# Patient Record
Sex: FEMALE | Race: WHITE | HISPANIC OR LATINO | Employment: UNEMPLOYED | URBAN - METROPOLITAN AREA
[De-identification: names, ages, dates, MRNs, and addresses within clinical notes are randomized per-mention and may not be internally consistent; named-entity substitution may affect disease eponyms.]

---

## 2018-09-30 ENCOUNTER — HOSPITAL ENCOUNTER (EMERGENCY)
Facility: CLINIC | Age: 23
Discharge: HOME | End: 2018-09-30
Attending: EMERGENCY MEDICINE | Admitting: EMERGENCY MEDICINE
Payer: COMMERCIAL

## 2018-09-30 ENCOUNTER — APPOINTMENT (OUTPATIENT)
Dept: ULTRASOUND IMAGING | Facility: CLINIC | Age: 23
End: 2018-09-30
Attending: PHYSICIAN ASSISTANT
Payer: COMMERCIAL

## 2018-09-30 VITALS
DIASTOLIC BLOOD PRESSURE: 44 MMHG | HEART RATE: 84 BPM | TEMPERATURE: 98.2 F | OXYGEN SATURATION: 100 % | WEIGHT: 190 LBS | SYSTOLIC BLOOD PRESSURE: 95 MMHG | RESPIRATION RATE: 16 BRPM

## 2018-09-30 DIAGNOSIS — Z32.01 PREGNANCY TEST POSITIVE: ICD-10-CM

## 2018-09-30 DIAGNOSIS — O20.9 VAGINAL BLEEDING IN PREGNANCY, FIRST TRIMESTER: ICD-10-CM

## 2018-09-30 LAB
ABO + RH BLD: NORMAL
ABO + RH BLD: NORMAL
ALBUMIN SERPL-MCNC: 4.2 G/DL (ref 3.4–5)
ALBUMIN UR-MCNC: NEGATIVE MG/DL
ALP SERPL-CCNC: 114 U/L (ref 40–150)
ALT SERPL W P-5'-P-CCNC: 26 U/L (ref 0–50)
ANION GAP SERPL CALCULATED.3IONS-SCNC: 6 MMOL/L (ref 3–14)
APPEARANCE UR: CLEAR
AST SERPL W P-5'-P-CCNC: 27 U/L (ref 0–45)
B-HCG FREE SERPL-ACNC: 502.4 IU/L
BACTERIA #/AREA URNS HPF: ABNORMAL /HPF
BASOPHILS # BLD AUTO: 0.1 10E9/L (ref 0–0.2)
BASOPHILS NFR BLD AUTO: 0.5 %
BILIRUB SERPL-MCNC: 0.5 MG/DL (ref 0.2–1.3)
BILIRUB UR QL STRIP: NEGATIVE
BLOOD BANK CMNT PATIENT-IMP: NORMAL
BLOOD BANK CMNT PATIENT-IMP: NORMAL
BUN SERPL-MCNC: 16 MG/DL (ref 7–30)
CALCIUM SERPL-MCNC: 8.4 MG/DL (ref 8.5–10.1)
CHLORIDE SERPL-SCNC: 107 MMOL/L (ref 94–109)
CO2 SERPL-SCNC: 25 MMOL/L (ref 20–32)
COLOR UR AUTO: YELLOW
CREAT SERPL-MCNC: 0.62 MG/DL (ref 0.52–1.04)
D DIMER PPP FEU-MCNC: 0.3 UG/ML FEU (ref 0–0.5)
DIFFERENTIAL METHOD BLD: ABNORMAL
EOSINOPHIL # BLD AUTO: 0.5 10E9/L (ref 0–0.7)
EOSINOPHIL NFR BLD AUTO: 4 %
ERYTHROCYTE [DISTWIDTH] IN BLOOD BY AUTOMATED COUNT: 12 % (ref 10–15)
FETAL CELL SCN BLD QL ROSETTE: NORMAL
GFR SERPL CREATININE-BSD FRML MDRD: >90 ML/MIN/1.7M2
GLUCOSE SERPL-MCNC: 76 MG/DL (ref 70–99)
GLUCOSE UR STRIP-MCNC: NEGATIVE MG/DL
HCT VFR BLD AUTO: 43 % (ref 35–47)
HGB BLD-MCNC: 14 G/DL (ref 11.7–15.7)
HGB UR QL STRIP: NEGATIVE
IMM GRANULOCYTES # BLD: 0 10E9/L (ref 0–0.4)
IMM GRANULOCYTES NFR BLD: 0.3 %
KETONES UR STRIP-MCNC: 20 MG/DL
LEUKOCYTE ESTERASE UR QL STRIP: NEGATIVE
LIPASE SERPL-CCNC: 133 U/L (ref 73–393)
LYMPHOCYTES # BLD AUTO: 2.6 10E9/L (ref 0.8–5.3)
LYMPHOCYTES NFR BLD AUTO: 23.5 %
MCH RBC QN AUTO: 29.3 PG (ref 26.5–33)
MCHC RBC AUTO-ENTMCNC: 32.6 G/DL (ref 31.5–36.5)
MCV RBC AUTO: 90 FL (ref 78–100)
MONOCYTES # BLD AUTO: 0.6 10E9/L (ref 0–1.3)
MONOCYTES NFR BLD AUTO: 5.1 %
MUCOUS THREADS #/AREA URNS LPF: PRESENT /LPF
NEUTROPHILS # BLD AUTO: 7.4 10E9/L (ref 1.6–8.3)
NEUTROPHILS NFR BLD AUTO: 66.6 %
NITRATE UR QL: NEGATIVE
NRBC # BLD AUTO: 0 10*3/UL
NRBC BLD AUTO-RTO: 0 /100
PH UR STRIP: 7 PH (ref 5–7)
PLATELET # BLD AUTO: 288 10E9/L (ref 150–450)
POTASSIUM SERPL-SCNC: 3.5 MMOL/L (ref 3.4–5.3)
PROT SERPL-MCNC: 8.4 G/DL (ref 6.8–8.8)
RBC # BLD AUTO: 4.78 10E12/L (ref 3.8–5.2)
RBC #/AREA URNS AUTO: 1 /HPF (ref 0–2)
RH IG VIALS RECOM PATIENT: NORMAL
SODIUM SERPL-SCNC: 138 MMOL/L (ref 133–144)
SOURCE: ABNORMAL
SP GR UR STRIP: 1.03 (ref 1–1.03)
SPECIMEN SOURCE: NORMAL
SQUAMOUS #/AREA URNS AUTO: 3 /HPF (ref 0–1)
UROBILINOGEN UR STRIP-MCNC: 4 MG/DL (ref 0–2)
WBC # BLD AUTO: 11.2 10E9/L (ref 4–11)
WBC #/AREA URNS AUTO: 2 /HPF (ref 0–5)
WET PREP SPEC: NORMAL

## 2018-09-30 PROCEDURE — 87491 CHLMYD TRACH DNA AMP PROBE: CPT | Performed by: PHYSICIAN ASSISTANT

## 2018-09-30 PROCEDURE — 25000128 H RX IP 250 OP 636: Performed by: PHYSICIAN ASSISTANT

## 2018-09-30 PROCEDURE — 96361 HYDRATE IV INFUSION ADD-ON: CPT

## 2018-09-30 PROCEDURE — 80053 COMPREHEN METABOLIC PANEL: CPT | Performed by: PHYSICIAN ASSISTANT

## 2018-09-30 PROCEDURE — 81001 URINALYSIS AUTO W/SCOPE: CPT | Performed by: PHYSICIAN ASSISTANT

## 2018-09-30 PROCEDURE — 25000128 H RX IP 250 OP 636: Performed by: EMERGENCY MEDICINE

## 2018-09-30 PROCEDURE — 83690 ASSAY OF LIPASE: CPT | Performed by: PHYSICIAN ASSISTANT

## 2018-09-30 PROCEDURE — 96374 THER/PROPH/DIAG INJ IV PUSH: CPT

## 2018-09-30 PROCEDURE — 85025 COMPLETE CBC W/AUTO DIFF WBC: CPT | Performed by: PHYSICIAN ASSISTANT

## 2018-09-30 PROCEDURE — 86900 BLOOD TYPING SEROLOGIC ABO: CPT | Performed by: EMERGENCY MEDICINE

## 2018-09-30 PROCEDURE — 93005 ELECTROCARDIOGRAM TRACING: CPT

## 2018-09-30 PROCEDURE — 85461 HEMOGLOBIN FETAL: CPT | Performed by: EMERGENCY MEDICINE

## 2018-09-30 PROCEDURE — 99285 EMERGENCY DEPT VISIT HI MDM: CPT | Mod: 25

## 2018-09-30 PROCEDURE — 85379 FIBRIN DEGRADATION QUANT: CPT | Performed by: PHYSICIAN ASSISTANT

## 2018-09-30 PROCEDURE — 87210 SMEAR WET MOUNT SALINE/INK: CPT | Performed by: PHYSICIAN ASSISTANT

## 2018-09-30 PROCEDURE — 86901 BLOOD TYPING SEROLOGIC RH(D): CPT | Performed by: EMERGENCY MEDICINE

## 2018-09-30 PROCEDURE — 87591 N.GONORRHOEAE DNA AMP PROB: CPT | Performed by: PHYSICIAN ASSISTANT

## 2018-09-30 PROCEDURE — 84702 CHORIONIC GONADOTROPIN TEST: CPT

## 2018-09-30 PROCEDURE — 25000125 ZZHC RX 250: Performed by: PHYSICIAN ASSISTANT

## 2018-09-30 PROCEDURE — 76817 TRANSVAGINAL US OBSTETRIC: CPT

## 2018-09-30 PROCEDURE — 25000132 ZZH RX MED GY IP 250 OP 250 PS 637: Performed by: PHYSICIAN ASSISTANT

## 2018-09-30 RX ORDER — METOCLOPRAMIDE HYDROCHLORIDE 5 MG/ML
10 INJECTION INTRAMUSCULAR; INTRAVENOUS ONCE
Status: COMPLETED | OUTPATIENT
Start: 2018-09-30 | End: 2018-09-30

## 2018-09-30 RX ORDER — CLOTRIMAZOLE 1 %
CREAM (GRAM) TOPICAL 2 TIMES DAILY
Qty: 45 G | Refills: 0 | Status: SHIPPED | OUTPATIENT
Start: 2018-09-30 | End: 2019-03-20

## 2018-09-30 RX ORDER — SODIUM CHLORIDE 9 MG/ML
1000 INJECTION, SOLUTION INTRAVENOUS CONTINUOUS
Status: DISCONTINUED | OUTPATIENT
Start: 2018-09-30 | End: 2018-09-30 | Stop reason: HOSPADM

## 2018-09-30 RX ADMIN — METOCLOPRAMIDE 10 MG: 5 INJECTION, SOLUTION INTRAMUSCULAR; INTRAVENOUS at 16:14

## 2018-09-30 RX ADMIN — SODIUM CHLORIDE 1000 ML: 9 INJECTION, SOLUTION INTRAVENOUS at 15:14

## 2018-09-30 RX ADMIN — LIDOCAINE HYDROCHLORIDE 30 ML: 20 SOLUTION ORAL; TOPICAL at 15:55

## 2018-09-30 ASSESSMENT — ENCOUNTER SYMPTOMS: ABDOMINAL PAIN: 1

## 2018-09-30 NOTE — ED TRIAGE NOTES
1. Chest pain for 5-6 days, sharp in nature.     2. Vaginal bleeding, heavier than usual, along with low abdominal pain across the low abdomen. Last period August 25th. Not sure if she is pregnant.

## 2018-09-30 NOTE — ED AVS SNAPSHOT
[facility] Emergency Department    [address] [place] [address]    Phone:  [phone]    Fax:  [phone]                                       Katelyn Hollingsworth   MRN: [id number]    Department:  [facility] Emergency Department   Date of Visit:  9/30/2018           Patient Information     Date Of Birth          1995        Your diagnoses for this visit were:     Pregnancy test positive     Vaginal bleeding in pregnancy, first trimester        You were seen by Matthew Leon MD.      Follow-up Information     Follow up with [facility]. Schedule an appointment as soon as possible for a visit in 1 day.    Contact information:    [address]  [facility] [address]          Discharge Instructions         Sangrado en la primera etapa del embarazo     La ecografía puede ayudar a comprobar la melissa de giles feto.     Si ha sangrado en las primeras etapas de giles embarazo, sepa que no es la única. Muchas otras mujeres embarazadas pozo tenido también sangrado al comienzo del embarazo. En la mayoría de los casos, no significa un problema. Arianna, incluso así, giles proveedor de atención médica tiene que saberlo, ya que quizás quiera hacerle pruebas para saber por qué ha sangrado. Llame a giles proveedor de atención médica si nota que ha sangrado mientras está embarazada.   Cuáles son las causas de un sangrado en las primeras etapas?  Con frecuencia, no se conocen las causas del sangrado en las primeras etapas. Arianna, hay muchos factores en el comienzo del embarazo que pueden ocasionar sangrado o manchado, por ejemplo, grant las relaciones sexuales (que pueden causar sangrado en cualquiera de los trimestres). Las siguientes son otras causas probables:    Implantación del embrión en la pared uterina    Hemorragia subcoriónica (sangrado entre el corion y el útero)    Aborto espontáneo    Embarazo ectópico (en kelle de las trompas de Falopio)  Si nota  un manchado  El manchado (causado por un sangrado muy leve) es el tipo de sangrado más común a principios del embarazo. Si lo nota, llame a giles proveedor de atención médica. Es muy probable que le diga que puede cuidarse en giles casa.  Si necesita hacerse algún examen  Según cuánto haya sangrado, puede que giles otro proveedor de atención médica le pidan hacerse algunos exámenes. Por ejemplo, un examen pélvico, puede ayudar a comprobar qué tan avanzado está el embarazo. También puede que le katie kelle ecografía común o kelle ecografía Doppler. Estas pruebas de diagnóstico por imágenes emplean ondas de maribel para comprobar la melissa de giles feto. Pueden hacerle la ecografía sobre el vientre o por dentro de giles vagina. También es posible que giles proveedor de atención médica le pida un análisis de kev especial. Elvia análisis compara abhijit niveles hormonales en muestras de kev tomadas con dos días de diferencia. Los resultados pueden ayudar a giles proveedor de atención médica a saber más sobre la implantación del embrión. También necesitarán comprobar giles tipo de kev para evaluar si es necesario tratarla por sensibilización al factor RH.   Signos de advertencia  Si giles sangrado no se detiene o si nota cualquiera de los siguientes síntomas, busque ayuda médica de inmediato:    Empapa kelle toallita sanitaria por hora.    Tiene sangrado kerline si tuviera giles período.    Tiene cólicos o dolor de estómago muy intensos.    Se siente mareada o se desmaya.    Expulsa tejido a través de giles vagina.    Tiene un sangrado en cualquier momento después de giles primer trimestre.  Preguntas que quizás le katie  Si albaro no es normal, es común algo de sangrado a principios del embarazo. Si notó algo de sangrado, seguramente se preocupará. Tenga en cuenta que el sangrado por sí solo no significa que haya algún problema. No obstante, llame a giles proveedor de atención médica de inmediato. Giles proveedor puede hacerle preguntas kerline estas para intentar detectar  la causa del sangrado:     Cuándo comenzó el sangrado?     Es un sangrado muy leve (manchado) o se parece a brambila periodo?     Es la kev de color helms brillante o es amarronada?     Garcia mantenido relaciones sexuales recientemente?     Ha tenido dolor o cólicos?     Se garcia sentido mareada o se ha desmayado?  Vigilancia de brambila embarazo  Con frecuencia, el sangrado se detendrá igual de rápido kerline comenzó. Brambila embarazo puede, entonces, seguir brambila transcurso normal. Es posible que necesite algunas visitas prenatales adicionales. Arianna, usted y brambila bebé muy probablemente estarán albaro.  Date Last Reviewed: 6/1/2016 2000-2017 [employer]. [address] [place] [address]. Todos los derechos reservados. Esta información no pretende sustituir la atención médica profesional. Sólo brambila médico puede diagnosticar y tratar un problema de melissa.          24 Hour Appointment Hotline       To make an appointment at any [place] clinic, call [phone] ([phone]). If you don't have a family doctor or clinic, we will help you find one. [place] clinics are conveniently located to serve the needs of you and your family.             Review of your medicines      START taking        Dose / Directions Last dose taken    clotrimazole 1 % cream   Commonly known as:  LOTRIMIN   Quantity:  45 g        Apply topically 2 times daily for 15 days   Refills:  0                Prescriptions were sent or printed at these locations (1 Prescription)                   Other Prescriptions                Printed at Department/Unit printer (1 of 1)         clotrimazole (LOTRIMIN) 1 % cream                Procedures and tests performed during your visit     CBC with platelets differential    Chlamydia trachomatis PCR    Comprehensive metabolic panel    D dimer quantitative    EKG 12 lead    ISTAT HCG Quantitative Pregnancy Nursing POCT    ISTAT HCG Quantitative Pregnancy POCT    Lipase    Neisseria gonorrhoeae PCR    Peripheral  IV catheter    Rh Immune Globulin Study    Rho (D) immune globulin (RhoGam) Lab Study    UA with Microscopic    US OB <14 Weeks W Transvaginal    Wet prep      Orders Needing Specimen Collection     None      Pending Results     Date and Time Order Name Status Description    9/30/2018 1716 Chlamydia trachomatis PCR In process     9/30/2018 1716 Neisseria gonorrhoeae PCR In process     9/30/2018 1421 EKG 12 lead Preliminary             Pending Culture Results     Date and Time Order Name Status Description    9/30/2018 1716 Chlamydia trachomatis PCR In process     9/30/2018 1716 Neisseria gonorrhoeae PCR In process             Pending Results Instructions     If you had any lab results that were not finalized at the time of your Discharge, you can call the ED Lab Result RN at [phone]. You will be contacted by this team for any positive Lab results or changes in treatment. The nurses are available 7 days a week from 10A to 6:30P.  You can leave a message 24 hours per day and they will return your call.        Test Results From Your Hospital Stay        9/30/2018  3:10 PM      Component Results     Component Value Ref Range & Units Status    WBC 11.2 (H) 4.0 - 11.0 10e9/L Final    RBC Count 4.78 3.8 - 5.2 10e12/L Final    Hemoglobin 14.0 11.7 - 15.7 g/dL Final    Hematocrit 43.0 35.0 - 47.0 % Final    MCV 90 78 - 100 fl Final    MCH 29.3 26.5 - 33.0 pg Final    MCHC 32.6 31.5 - 36.5 g/dL Final    RDW 12.0 10.0 - 15.0 % Final    Platelet Count 288 150 - 450 10e9/L Final    Diff Method Automated Method  Final    % Neutrophils 66.6 % Final    % Lymphocytes 23.5 % Final    % Monocytes 5.1 % Final    % Eosinophils 4.0 % Final    % Basophils 0.5 % Final    % Immature Granulocytes 0.3 % Final    Nucleated RBCs 0 0 /100 Final    Absolute Neutrophil 7.4 1.6 - 8.3 10e9/L Final    Absolute Lymphocytes 2.6 0.8 - 5.3 10e9/L Final    Absolute Monocytes 0.6 0.0 - 1.3 10e9/L Final    Absolute Eosinophils 0.5 0.0 - 0.7 10e9/L  Final    Absolute Basophils 0.1 0.0 - 0.2 10e9/L Final    Abs Immature Granulocytes 0.0 0 - 0.4 10e9/L Final    Absolute Nucleated RBC 0.0  Final         9/30/2018  3:26 PM      Component Results     Component Value Ref Range & Units Status    Sodium 138 133 - 144 mmol/L Final    Potassium 3.5 3.4 - 5.3 mmol/L Final    Chloride 107 94 - 109 mmol/L Final    Carbon Dioxide 25 20 - 32 mmol/L Final    Anion Gap 6 3 - 14 mmol/L Final    Glucose 76 70 - 99 mg/dL Final    Urea Nitrogen 16 7 - 30 mg/dL Final    Creatinine 0.62 0.52 - 1.04 mg/dL Final    GFR Estimate >90 >60 mL/min/1.7m2 Final    Non  GFR Calc    GFR Estimate If Black >90 >60 mL/min/1.7m2 Final    African American GFR Calc    Calcium 8.4 (L) 8.5 - 10.1 mg/dL Final    Bilirubin Total 0.5 0.2 - 1.3 mg/dL Final    Albumin 4.2 3.4 - 5.0 g/dL Final    Protein Total 8.4 6.8 - 8.8 g/dL Final    Alkaline Phosphatase 114 40 - 150 U/L Final    ALT 26 0 - 50 U/L Final    AST 27 0 - 45 U/L Final         9/30/2018  3:26 PM      Component Results     Component Value Ref Range & Units Status    Lipase 133 73 - 393 U/L Final         9/30/2018  4:29 PM      Component Results     Component Value Ref Range & Units Status    Color Urine Yellow  Final    Appearance Urine Clear  Final    Glucose Urine Negative NEG^Negative mg/dL Final    Bilirubin Urine Negative NEG^Negative Final    Ketones Urine 20 (A) NEG^Negative mg/dL Final    Specific Gravity Urine 1.026 1.003 - 1.035 Final    Blood Urine Negative NEG^Negative Final    pH Urine 7.0 5.0 - 7.0 pH Final    Protein Albumin Urine Negative NEG^Negative mg/dL Final    Urobilinogen mg/dL 4.0 (H) 0.0 - 2.0 mg/dL Final    Nitrite Urine Negative NEG^Negative Final    Leukocyte Esterase Urine Negative NEG^Negative Final    Source Midstream Urine  Final    WBC Urine 2 0 - 5 /HPF Final    RBC Urine 1 0 - 2 /HPF Final    Bacteria Urine Few (A) NEG^Negative /HPF Final    Squamous Epithelial /HPF Urine 3 (H) 0 - 1 /HPF Final     Mucous Urine Present (A) NEG^Negative /LPF Final         2018  3:30 PM      Component Results     Component Value Ref Range & Units Status    D Dimer 0.3 0.0 - 0.50 ug/ml FEU Final    This D-dimer assay is intended for use in conjunction with a clinical pretest   probability assessment model to exclude pulmonary embolism (PE) and deep   venous thrombosis (DVT) in outpatients suspected of PE or DVT. The cut-off   value is 0.5 ug/mL FEU.           2018  5:36 PM      Narrative     OBSTETRIC ULTRASOUND WITH ENDOVAGINAL TRANSDUCER    2018 4:38 PM     HISTORY: Positive HCG, vaginal bleeding and pelvic pain.    TECHNIQUE: Endovaginal sonography was added to the transabdominal exam  to better evaluate the uterus and ovaries because of inadequate  bladder fullness.    COMPARISON: None.    FINDINGS: There is no intrauterine gestational sac or fetal pole  identified. Endometrial stripe is normal at 1.1 cm. There is a 3.3 x  2.8 cm left ovarian cyst. Right ovary is normal. No free fluid.        Impression     IMPRESSION:  1. No evidence of intrauterine pregnancy. In light of positive  pregnancy test, ectopic pregnancy is not excluded. Recommend continued  follow-up of serial serum beta-hCG levels. Recent spontaneous   or pregnancy too early to detect by ultrasound also in the  differential diagnosis.  2. A 3.3 x 2.8 cm left ovarian cyst.    GERALD GASPAR MD         2018  3:24 PM      Component Results     Component    Rhogam Order    Order received    Comment:    See Rhogam Study/Suitability         2018  3:23 PM      Component Results     Component Value Ref Range & Units Status    HCG Quantitative Serum 502.4 (H) <5.0 IU/L Final         2018  3:52 PM      Component Results     Component    ABO    O    RH(D)    Pos    Fetal Blood Screen    Canceled, Test credited    Blood Bank Comment    Not suitable for Rh Immune Globulin  PATIENT RH POS      Amount of RHIG Required    Not suitable for  Rh Immune Globulin         9/30/2018  5:26 PM         9/30/2018  5:26 PM         9/30/2018  5:43 PM      Component Results     Component    Specimen Description    Vagina    Wet Prep    No Trichomonas seen    Wet Prep    No yeast seen    Wet Prep    Rare  PMNs seen      Wet Prep    No clue cells seen                Clinical Quality Measure: Blood Pressure Screening     Your blood pressure was checked while you were in the emergency department today. The last reading we obtained was  BP: 95/44 . Please read the guidelines below about what these numbers mean and what you should do about them.  If your systolic blood pressure (the top number) is less than 120 and your diastolic blood pressure (the bottom number) is less than 80, then your blood pressure is normal. There is nothing more that you need to do about it.  If your systolic blood pressure (the top number) is 120-139 or your diastolic blood pressure (the bottom number) is 80-89, your blood pressure may be higher than it should be. You should have your blood pressure rechecked within a year by a primary care provider.  If your systolic blood pressure (the top number) is 140 or greater or your diastolic blood pressure (the bottom number) is 90 or greater, you may have high blood pressure. High blood pressure is treatable, but if left untreated over time it can put you at risk for heart attack, stroke, or kidney failure. You should have your blood pressure rechecked by a primary care provider within the next 4 weeks.  If your provider in the emergency department today gave you specific instructions to follow-up with your doctor or provider even sooner than that, you should follow that instruction and not wait for up to 4 weeks for your follow-up visit.        Thank you for choosing [facility]       Thank you for choosing [facility] for your care. Our goal is always to provide you with excellent care. Hearing back from our patients is one way we can continue to improve  "our services. Please take a few minutes to complete the written survey that you may receive in the mail after you visit with us. Thank you!        [employer] Information     [employer] lets you send messages to your doctor, view your test results, renew your prescriptions, schedule appointments and more. To sign up, go to [email or web address] . Click on \"Log in\" on the left side of the screen, which will take you to the Welcome page. Then click on \"Sign up Now\" on the right side of the page.     You will be asked to enter the access code listed below, as well as some personal information. Please follow the directions to create your username and password.     Your access code is: [email or web address]  Expires: 2018  5:59 PM     Your access code will  in 90 days. If you need help or a new code, please call your [place] clinic or [phone].        Care EveryWhere ID     This is your Care EveryWhere ID. This could be used by other organizations to access your [facility] [place] medical records  [id number]        Equal Access to Services     [facility] : Hadglenn Magdaleno, chang romo, edis anderson. So [facility] [phone].    ATENCIÓN: Si habla español, tiene a giles disposición servicios gratuitos de asistencia lingüística. Yulissa al [phone].    We comply with applicable federal civil rights laws and [place] laws. We do not discriminate on the basis of race, color, national origin, age, disability, sex, sexual orientation, or gender identity.            After Visit Summary       This is your record. Keep this with you and show to your community pharmacist(s) and doctor(s) at your next visit.                  "

## 2018-09-30 NOTE — ED AVS SNAPSHOT
[facility] Emergency Department    [address]    [facility] [address]    Phone:  [phone]    Fax:  [phone]                                       Katelyn Hollingsworth   MRN: [id number]    Department:  [facility] Emergency Department   Date of Visit:  9/30/2018           After Visit Summary Signature Page     I have received my discharge instructions, and my questions have been answered. I have discussed any challenges I see with this plan with the nurse or doctor.    ..........................................................................................................................................  Patient/Patient Representative Signature      ..........................................................................................................................................  Patient Representative Print Name and Relationship to Patient    ..................................................               ................................................  Date                                   Time    ..........................................................................................................................................  Reviewed by Signature/Title    ...................................................              ..............................................  Date                                               Time          22EPIC Rev 08/18

## 2018-09-30 NOTE — DISCHARGE INSTRUCTIONS
Sangrado en la primera etapa del embarazo     La ecografía puede ayudar a comprobar la melissa de giles feto.     Si ha sangrado en las primeras etapas de giles embarazo, sepa que no es la única. Muchas otras mujeres embarazadas pozo tenido también sangrado al comienzo del embarazo. En la mayoría de los casos, no significa un problema. Arianna, incluso así, giles proveedor de atención médica tiene que saberlo, ya que quizás quiera hacerle pruebas para saber por qué ha sangrado. Llame a giles proveedor de atención médica si nota que ha sangrado mientras está embarazada.   Cuáles son las causas de un sangrado en las primeras etapas?  Con frecuencia, no se conocen las causas del sangrado en las primeras etapas. Arianna, hay muchos factores en el comienzo del embarazo que pueden ocasionar sangrado o manchado, por ejemplo, grant las relaciones sexuales (que pueden causar sangrado en cualquiera de los trimestres). Las siguientes son otras causas probables:    Implantación del embrión en la pared uterina    Hemorragia subcoriónica (sangrado entre el corion y el útero)    Aborto espontáneo    Embarazo ectópico (en kelle de las trompas de Falopio)  Si nota un manchado  El manchado (causado por un sangrado muy leve) es el tipo de sangrado más común a principios del embarazo. Si lo nota, llame a giles proveedor de atención médica. Es muy probable que le diga que puede cuidarse en giles casa.  Si necesita hacerse algún examen  Según cuánto haya sangrado, puede que giles otro proveedor de atención médica le pidan hacerse algunos exámenes. Por ejemplo, un examen pélvico, puede ayudar a comprobar qué tan avanzado está el embarazo. También puede que le katie kelle ecografía común o kelle ecografía Doppler. Estas pruebas de diagnóstico por imágenes emplean ondas de maribel para comprobar la melissa de giles feto. Pueden hacerle la ecografía sobre el vientre o por dentro de giles vagina. También es posible que giles proveedor de atención médica le pida un análisis de kev  especial. Elvia análisis compara abhijit niveles hormonales en muestras de kev tomadas con dos días de diferencia. Los resultados pueden ayudar a brambila proveedor de atención médica a saber más sobre la implantación del embrión. También necesitarán comprobar brambila tipo de kev para evaluar si es necesario tratarla por sensibilización al factor RH.   Signos de advertencia  Si brambila sangrado no se detiene o si nota cualquiera de los siguientes síntomas, busque ayuda médica de inmediato:    Empapa kelle toallita sanitaria por hora.    Tiene sangrado kerline si tuviera brambila período.    Tiene cólicos o dolor de estómago muy intensos.    Se siente mareada o se desmaya.    Expulsa tejido a través de brambila vagina.    Tiene un sangrado en cualquier momento después de brambila primer trimestre.  Preguntas que quizás le katie  Si albaro no es normal, es común algo de sangrado a principios del embarazo. Si notó algo de sangrado, seguramente se preocupará. Tenga en cuenta que el sangrado por sí solo no significa que haya algún problema. No obstante, llame a brambila proveedor de atención médica de inmediato. Brambila proveedor puede hacerle preguntas kerline estas para intentar detectar la causa del sangrado:     Cuándo comenzó el sangrado?     Es un sangrado muy leve (manchado) o se parece a brambila periodo?     Es la kev de color helms brillante o es amarronada?     Garcia mantenido relaciones sexuales recientemente?     Ha tenido dolor o cólicos?     Se garcia sentido mareada o se ha desmayado?  Vigilancia de brambila embarazo  Con frecuencia, el sangrado se detendrá igual de rápido kerline comenzó. Brambila embarazo puede, entonces, seguir brambila transcurso normal. Es posible que necesite algunas visitas prenatales adicionales. Arianna, usted y brambila bebé muy probablemente estarán albaro.  Date Last Reviewed: 6/1/2016 2000-2017 [employer]. [address] [place] [address], [place] [address]. Todos los derechos reservados. Esta información no pretende sustituir la atención médica profesional. Sólo  giles médico puede diagnosticar y tratar un problema de melissa.

## 2018-09-30 NOTE — ED PROVIDER NOTES
History     Chief Complaint:  Chest Pain; Abdominal Pain; and Vaginal Bleeding    HPI limited secondary to language barrier. HPI provided through Azerbaijani interpretor phone.  HPI   Katelyn Hollingsworth is a 23 year old female, who presents with her son and daughter to the ED for the evaluation of chest pain, abdominal pain, and vaginal bleeding. The patient reports that yesterday she started to experience heavy vaginal bleeding, prompting her to the ED. The patient notes that she went through 3-4 pads yesterday from the bleeding. The patient also notes that she is experiencing lower abdominal pain, diffusely across. The patient has also been experiencing chest pain for the past 5-6 days, which she describes as a stinging, constant, and different from her acid reflux history. The patient notes that her last menstrual period was on the 25th of August, and that she is currently sexually active, but is not on any contraceptive medications.     Allergies:  No known drug allergies     Medications:    The patient is not currently taking any prescribed medications.    Past Medical History:    Acid reflux    Past Surgical History:    History reviewed. No pertinent surgical history.    Family History:    History reviewed. No pertinent family history.     Social History:  Smoking status: Never Smoker  Alcohol use: no  Marital Status:     Review of Systems   Cardiovascular: Positive for chest pain.   Gastrointestinal: Positive for abdominal pain.   Genitourinary: Positive for vaginal bleeding.   All other systems reviewed and are negative.    Physical Exam   Patient Vitals for the past 24 hrs:   BP Temp Temp src Pulse Resp SpO2 Weight   09/30/18 1436 129/70 98.2  F (36.8  C) Oral 100 20 99 % 86.2 kg (190 lb)     Physical Exam    General: Obese female, well-nourished, appears stated age  Eyes: PERRL, conjunctivae pink no scleral icterus or conjunctival injection  ENT:  Moist mucus membranes, pharynx nonerythematous and  without exudate  Respiratory:  No respiratory distress, no wheezes, crackles or rales  CV: Normal rate, no murmurs, rubs or gallops  GI: Nontender, nondistended, normal bowel sounds, no rebound or guarding  : No CVA tenderness  Pelvic: Normal external genitalia, normal vaginal canal no sign of any bleeding in the vaginal vault, closed cervical eyes.   Skin: Warm, dry.  No rashes or petechiae  Musculoskeletal: No peripheral edema or calf tenderness, strength 5 out of 5 throughout  Neuro: GCS 15, alert and oriented to person/place/time, cranial nerves II through XII intact  Psychiatric: Normal affect      Emergency Department Course   ECG (14:31:29):  Rate 86 bpm. ND interval 130. QRS duration 86. QT/QTc 376/449. P-R-T axes 23 22 46. Sinus rhythm with sinus arrhythmia with occasional premature ventricular complexes. Otherwise normal ECG.  Interpreted at 1440 by Matthew Leon MD.    Imaging:  Radiographic findings were communicated with the patient who voiced understanding of the findings.  US OB<14 Weeks W Transvaginal  1. No evidence of intrauterine pregnancy. In light of positive  pregnancy test, ectopic pregnancy is not excluded. Recommend continued  follow-up of serial serum beta-hCG levels. Recent spontaneous   or pregnancy too early to detect by ultrasound also in the  differential diagnosis.  2. A 3.3 x 2.8 cm left ovarian cyst.  As read by Radiology.    Laboratory:  CBC: WBC 11.2 (H), WNL (HGB 14.0, )  CMP: Calcium 8.4 (L), WNL (Creatinine 0.62)  Lipase: 133  D dimer quantitative (1459): 0.3  UA: Urineketon 20, Urobilinogen mg/dL 4.0 (H), Bacteria Few, Squamous Epithelial 3 (H), Mucous Present, o/w Negative  ISTAT HCG Quantitative Pregnancy POCT (1510): 502.4 (H)  Rh Immune Globulin Study: (In process)  Neisseria gonorrhoeae: (In process)  Chlamydia trachomatis: (In process)    Interventions:  151, NS 1L IV Bolus  1555, viscous, 30 mLs, PO  1614, reglan, 10 mg, IV    Emergency  Department Course:  Past medical records, nursing notes, and vitals reviewed.  1436: I performed an exam of the patient and obtained history, as documented above.    IV inserted and blood drawn.    The patient was sent for an ultra sound while in the emergency department, findings above.    1525: I rechecked the patient. Explained findings to patient.    1714: I rechecked the patient. Explained findings to patient.    1800: I rechecked the patient.  Findings and plan explained to the Patient. Patient discharged home with instructions regarding supportive care, medications, and reasons to return. The importance of close follow-up was reviewed.     Impression & Plan    Medical Decision Making:    Katelyn Hollingsworth is a 23 year old female who presents for post chest pain, pelvic pain and vaginal bleeding.  A broad differential was considered including: Pregnancy, dysfunctional uterine bleeding, ovulation bleeding, miscarriage, potentially pulmonary embolism.  In the emergency department the patient's pregnancy test was positive with an hCG of 502.  Otherwise the remainder of her laboratory workup was unremarkable including a negative d-dimer study.  OB ultrasound shows no evidence of IUP and cannot definitively exclude ectopic pregnancy versus recent spontaneous .  The patient's pelvic exam was unremarkable and there was no evidence of blood in the vaginal vault, and no blood coming from the closed cervical office.  Exam shows some mild epigastric tenderness as well as lower abdominal tenderness.  Will have them follow up with gynecology in 48 hours for re-evaluation and repeat hCG to confirm progression of pregnancy and rule out ectopic.  Questions were answered prior to discharge.        Diagnosis:    ICD-10-CM    1. Pregnancy test positive Z32.01 Wet prep   2. Vaginal bleeding in pregnancy, first trimester O20.9        Disposition:  discharged to home    Discharge Medications:        David  Vanda  9/30/2018   [facility] EMERGENCY DEPARTMENT  Scribe Disclosure:  I, David Vanda, am serving as a scribe at 2:36 PM on 9/30/2018 to document services personally performed by Tristan Knapp PA-C based on my observations and the provider's statements to me.        Tristan Knapp PA-C  09/30/18 1824

## 2018-10-01 LAB
C TRACH DNA SPEC QL NAA+PROBE: NEGATIVE
INTERPRETATION ECG - MUSE: NORMAL
N GONORRHOEA DNA SPEC QL NAA+PROBE: NEGATIVE
SPECIMEN SOURCE: NORMAL
SPECIMEN SOURCE: NORMAL

## 2018-10-06 ENCOUNTER — HOSPITAL ENCOUNTER (EMERGENCY)
Facility: CLINIC | Age: 23
Discharge: HOME | End: 2018-10-06
Attending: EMERGENCY MEDICINE | Admitting: EMERGENCY MEDICINE
Payer: COMMERCIAL

## 2018-10-06 ENCOUNTER — APPOINTMENT (OUTPATIENT)
Dept: ULTRASOUND IMAGING | Facility: CLINIC | Age: 23
End: 2018-10-06
Attending: EMERGENCY MEDICINE
Payer: COMMERCIAL

## 2018-10-06 VITALS
OXYGEN SATURATION: 98 % | TEMPERATURE: 97.9 F | HEART RATE: 100 BPM | DIASTOLIC BLOOD PRESSURE: 70 MMHG | RESPIRATION RATE: 18 BRPM | SYSTOLIC BLOOD PRESSURE: 125 MMHG

## 2018-10-06 DIAGNOSIS — R10.9 ABDOMINAL PAIN, LEFT LATERAL: ICD-10-CM

## 2018-10-06 DIAGNOSIS — B37.31 CANDIDIASIS OF VAGINA: ICD-10-CM

## 2018-10-06 DIAGNOSIS — K59.00 CONSTIPATION, UNSPECIFIED CONSTIPATION TYPE: ICD-10-CM

## 2018-10-06 DIAGNOSIS — Z33.1 PREGNANCY, INCIDENTAL: ICD-10-CM

## 2018-10-06 LAB
ALBUMIN SERPL-MCNC: 3.9 G/DL (ref 3.4–5)
ALBUMIN UR-MCNC: NEGATIVE MG/DL
ALP SERPL-CCNC: 100 U/L (ref 40–150)
ALT SERPL W P-5'-P-CCNC: 21 U/L (ref 0–50)
ANION GAP SERPL CALCULATED.3IONS-SCNC: 9 MMOL/L (ref 3–14)
APPEARANCE UR: CLEAR
AST SERPL W P-5'-P-CCNC: 20 U/L (ref 0–45)
B-HCG SERPL-ACNC: 3357 IU/L (ref 0–5)
BACTERIA #/AREA URNS HPF: ABNORMAL /HPF
BASOPHILS # BLD AUTO: 0.1 10E9/L (ref 0–0.2)
BASOPHILS NFR BLD AUTO: 0.3 %
BILIRUB DIRECT SERPL-MCNC: 0.1 MG/DL (ref 0–0.2)
BILIRUB SERPL-MCNC: 0.4 MG/DL (ref 0.2–1.3)
BILIRUB UR QL STRIP: NEGATIVE
BUN SERPL-MCNC: 6 MG/DL (ref 7–30)
CALCIUM SERPL-MCNC: 8.7 MG/DL (ref 8.5–10.1)
CHLORIDE SERPL-SCNC: 105 MMOL/L (ref 94–109)
CO2 SERPL-SCNC: 24 MMOL/L (ref 20–32)
COLOR UR AUTO: YELLOW
CREAT SERPL-MCNC: 0.49 MG/DL (ref 0.52–1.04)
DIFFERENTIAL METHOD BLD: ABNORMAL
EOSINOPHIL # BLD AUTO: 0.3 10E9/L (ref 0–0.7)
EOSINOPHIL NFR BLD AUTO: 2 %
ERYTHROCYTE [DISTWIDTH] IN BLOOD BY AUTOMATED COUNT: 12.4 % (ref 10–15)
GFR SERPL CREATININE-BSD FRML MDRD: >90 ML/MIN/1.7M2
GLUCOSE SERPL-MCNC: 107 MG/DL (ref 70–99)
GLUCOSE UR STRIP-MCNC: NEGATIVE MG/DL
HCT VFR BLD AUTO: 39.8 % (ref 35–47)
HGB BLD-MCNC: 13.2 G/DL (ref 11.7–15.7)
HGB UR QL STRIP: ABNORMAL
IMM GRANULOCYTES # BLD: 0.1 10E9/L (ref 0–0.4)
IMM GRANULOCYTES NFR BLD: 0.4 %
KETONES UR STRIP-MCNC: 80 MG/DL
LEUKOCYTE ESTERASE UR QL STRIP: NEGATIVE
LYMPHOCYTES # BLD AUTO: 2.9 10E9/L (ref 0.8–5.3)
LYMPHOCYTES NFR BLD AUTO: 18 %
MCH RBC QN AUTO: 29.7 PG (ref 26.5–33)
MCHC RBC AUTO-ENTMCNC: 33.2 G/DL (ref 31.5–36.5)
MCV RBC AUTO: 90 FL (ref 78–100)
MONOCYTES # BLD AUTO: 0.8 10E9/L (ref 0–1.3)
MONOCYTES NFR BLD AUTO: 5 %
MUCOUS THREADS #/AREA URNS LPF: PRESENT /LPF
NEUTROPHILS # BLD AUTO: 11.8 10E9/L (ref 1.6–8.3)
NEUTROPHILS NFR BLD AUTO: 74.3 %
NITRATE UR QL: NEGATIVE
NRBC # BLD AUTO: 0 10*3/UL
NRBC BLD AUTO-RTO: 0 /100
PH UR STRIP: 5 PH (ref 5–7)
PLATELET # BLD AUTO: 283 10E9/L (ref 150–450)
POTASSIUM SERPL-SCNC: 3.2 MMOL/L (ref 3.4–5.3)
PROT SERPL-MCNC: 7.7 G/DL (ref 6.8–8.8)
RBC # BLD AUTO: 4.44 10E12/L (ref 3.8–5.2)
RBC #/AREA URNS AUTO: 1 /HPF (ref 0–2)
SODIUM SERPL-SCNC: 138 MMOL/L (ref 133–144)
SOURCE: ABNORMAL
SP GR UR STRIP: 1.03 (ref 1–1.03)
SQUAMOUS #/AREA URNS AUTO: <1 /HPF (ref 0–1)
UROBILINOGEN UR STRIP-MCNC: 0 MG/DL (ref 0–2)
WBC # BLD AUTO: 15.8 10E9/L (ref 4–11)
WBC #/AREA URNS AUTO: 1 /HPF (ref 0–5)

## 2018-10-06 PROCEDURE — 25000132 ZZH RX MED GY IP 250 OP 250 PS 637: Performed by: EMERGENCY MEDICINE

## 2018-10-06 PROCEDURE — 84702 CHORIONIC GONADOTROPIN TEST: CPT | Performed by: EMERGENCY MEDICINE

## 2018-10-06 PROCEDURE — 85025 COMPLETE CBC W/AUTO DIFF WBC: CPT | Performed by: EMERGENCY MEDICINE

## 2018-10-06 PROCEDURE — 81001 URINALYSIS AUTO W/SCOPE: CPT | Performed by: EMERGENCY MEDICINE

## 2018-10-06 PROCEDURE — 99284 EMERGENCY DEPT VISIT MOD MDM: CPT | Mod: 25

## 2018-10-06 PROCEDURE — 96374 THER/PROPH/DIAG INJ IV PUSH: CPT

## 2018-10-06 PROCEDURE — 80048 BASIC METABOLIC PNL TOTAL CA: CPT | Performed by: EMERGENCY MEDICINE

## 2018-10-06 PROCEDURE — 76801 OB US < 14 WKS SINGLE FETUS: CPT

## 2018-10-06 PROCEDURE — 80076 HEPATIC FUNCTION PANEL: CPT | Performed by: EMERGENCY MEDICINE

## 2018-10-06 PROCEDURE — 25000125 ZZHC RX 250: Performed by: EMERGENCY MEDICINE

## 2018-10-06 RX ORDER — CLOTRIMAZOLE 1 %
1 CREAM WITH APPLICATOR VAGINAL AT BEDTIME
Qty: 1 G | Refills: 0 | Status: SHIPPED | OUTPATIENT
Start: 2018-10-06 | End: 2019-03-20

## 2018-10-06 RX ORDER — DOCUSATE SODIUM 100 MG/1
100 CAPSULE, LIQUID FILLED ORAL 3 TIMES DAILY PRN
Qty: 7 CAPSULE | Refills: 0 | Status: SHIPPED | OUTPATIENT
Start: 2018-10-06 | End: 2018-11-29

## 2018-10-06 RX ADMIN — LIDOCAINE HYDROCHLORIDE 30 ML: 20 SOLUTION ORAL; TOPICAL at 05:40

## 2018-10-06 RX ADMIN — FAMOTIDINE 20 MG: 10 INJECTION, SOLUTION INTRAVENOUS at 05:40

## 2018-10-06 ASSESSMENT — ENCOUNTER SYMPTOMS
ABDOMINAL PAIN: 1
BACK PAIN: 1
DIARRHEA: 0
CHILLS: 0
VOMITING: 0
NAUSEA: 0
FEVER: 0
DYSURIA: 1
CONSTIPATION: 1

## 2018-10-06 NOTE — ED AVS SNAPSHOT
[facility] Emergency Department    [address]    [facility] [address]    Phone:  [phone]    Fax:  [phone]                                       Katelyn Hollingsworth   MRN: [id number]    Department:  [facility] Emergency Department   Date of Visit:  10/6/2018           After Visit Summary Signature Page     I have received my discharge instructions, and my questions have been answered. I have discussed any challenges I see with this plan with the nurse or doctor.    ..........................................................................................................................................  Patient/Patient Representative Signature      ..........................................................................................................................................  Patient Representative Print Name and Relationship to Patient    ..................................................               ................................................  Date                                   Time    ..........................................................................................................................................  Reviewed by Signature/Title    ...................................................              ..............................................  Date                                               Time          22EPIC Rev 08/18

## 2018-10-06 NOTE — ED AVS SNAPSHOT
[facility] [place] [facility]    [address] [place] [address]    Phone:  [phone]    Fax:  [phone]                                       Katelyn Hollingsworth   MRN: [id number]    Department:  [facility]   Date of Visit:  10/6/2018           Patient Information     Date Of Birth          1995        Your diagnoses for this visit were:     Abdominal pain, left lateral     Constipation, unspecified constipation type     Candidiasis of vagina     Pregnancy, incidental        You were seen by Bg Rosenberg MD and Yifan Salinas MD.      Follow-up Information     Follow up with [facility], [place]. Schedule an appointment as soon as possible for a visit in 2 days.    Specialty:  Family Practice    Contact information:    [address] [place] [address]  [phone]          Discharge Instructions         Dolor Abdominal    El dolor abdominal es dolor en el vientre o en la herbert del intestino. Todo el sofia tiene hernando tipo de dolor de vez en cuando. En muchos casos el dolor desaparece por sí solo. Arianna en ciertas ocasiones el dolor abdominal puede ser consecuencia de un problema grave, kerline por ejemplo kelle apendicitis. Por lo tanto, es importante saber cuándo se debe obtener ayuda.  Causas del dolor abdominal  El dolor abdominal puede tener muchas causas. Entre las causas más comunes, en los adultos, se encuentran las siguientes:     Estreñimiento, diarrea o gases    Reflujo gastroesofágico (desplazamiento del ácido estomacal hacia el esófago, también llamado  reflujo ácido  o  ardor estomacal )    Úlcera péptica (lesión en el revestimiento interno del estómago o del intestino cintron)    Inflamación de la vesícula biliar, el hígado o el páncreas    Cálculos biliares o renales    Apendicitis    Obstrucción del intestino    Hernia (protrusión o abultamiento de un órgano interno a través de un músculo u otro  denissedo)    Infecciones de las vías urinarias    En las mujeres, cólicos menstruales, fibromas o endometriosis del útero    Inflamación o infección del intestino  Diagnóstico de la causa del dolor abdominal  Giles proveedor de atención médica le hará un examen para ayudar a determinar la causa de giles dolor. En jojo necesario, le harán ciertas pruebas. El dolor abdominal puede ser difícil de diagnosticar porque abhijit causas pueden ser muy diversas. Los detalles que usted sepa tomasz acerca de giles dolor pueden resultar útiles. Diga a giles proveedor de atención médica dónde y cuándo siente el dolor y qué cosas lo alivian o lo empeoran. Mencione también si tiene otros síntomas kerline fiebre, cansancio, náuseas, vómito o cambios en la frecuencia con que evacúa abhijit intestinos o giles vejiga.  Tratamiento del dolor abdominal  Ciertas causas de hernando dolor, kerline kelle apendicitis o kelle obstrucción intestinal, requieren tratamiento urgente. Otros problemas pueden tratarse mediante descanso, consumo de líquidos o medicamentos. Giles proveedor de atención médica le dará instrucciones específicas para el tratamiento que debe seguir o cómo debe cuidarse según la causa de giles dolor.   Si tiene vómito o diarrea, key pequeños sorbos de agua u otros líquidos aaron. Cuando esté listo para comer de nuevo alimentos sólidos, empiece comiendo pequeñas cantidades de cosas fáciles de digerir, kerline puré de manzanas, pan donnie o galletas saladas.   Cuándo debe llamar al médico  Llame al 911 o vaya al [facility] de inmediato si tiene alguno de los siguientes síntomas:    No puede defecar y está vomitando    Está vomitando kev o tiene diarrea de color negruzco o muy oscuro    Tiene también dolor en el pecho, en el johnie o en el hombro    Siente que está a punto de desmayarse    Tiene dolor en los omóplatos, con náuseas    Tiene un dolor repentino e insoportable en el abdomen    Tiene un nuevo dolor distinto de todos los reza que ha tenido antes    Tiene el  vientre rígido, lisa y sensible al tacto  Llame a giles médico si tiene:    Dolor grant más de 5 días    Abotargamiento (sensación de llenura e inflamiento) grant más de 2 días    Diarrea grant más de 5 días    Fiebre superior a 101 F o más    Dolor que continúa aumentando    Pérdida de peso sin motivo aparente    Falta de apetito persistente    Sonny en las heces  Cómo prevenir el dolor abdominal  Estos son algunos consejos para ayudar a prevenir el dolor abdominal:    Coma cantidades más pequeñas de alimentos en cada comida.    Evite los alimentos fritos o que contengan mucha grasa.    Evite los alimentos que le producen gas.    Mallika ejercicio con regularidad.    Flavia abundantes líquidos.  Para ayudar a prevenir los síntomas del reflujo gastroesofágico:    Deje de fumar.    Flavia menos alcohol y coma menos de esos alimentos que aumentan giles acidez estomacal.    Pierda el exceso de peso.    Termine de comer kerline mínimo 2 horas antes de acostarse.    Eleve un poco la cabecera de giles cama.  Date Last Reviewed: 3/30/2014    [id number] The "[employer]. [address] [place] [address]. Todos los derechos reservados. Esta información no pretende sustituir la atención médica profesional. Sólo giles médico puede diagnosticar y tratar un problema de melissa.          24 Hour Appointment Hotline       To make an appointment at any [facility] clinic, call [phone] ([phone]). If you don't have a family doctor or clinic, we will help you find one. [facility] clinics are conveniently located to serve the needs of you and your family.             Review of your medicines      START taking        Dose / Directions Last dose taken    clotrimazole 1 % cream   Commonly known as:  LOTRIMIN   Dose:  1 Applicatorful   Quantity:  1 g        Place 1 Applicatorful vaginally At Bedtime for 7 days   Refills:  0        docusate sodium 100 MG capsule   Commonly known as:  COLACE   Dose:  100 mg   Quantity:  7 capsule         Take 1 capsule (100 mg) by mouth 3 times daily as needed for constipation   Refills:  0          Our records show that you are taking the medicines listed below. If these are incorrect, please call your family doctor or clinic.        Dose / Directions Last dose taken    clotrimazole 1 % cream   Commonly known as:  LOTRIMIN   Quantity:  45 g        Apply topically 2 times daily for 15 days   Refills:  0                Prescriptions were sent or printed at these locations (2 Prescriptions)                   Other Prescriptions                Printed at Department/Unit printer (2 of 2)         clotrimazole (LOTRIMIN) 1 % cream               docusate sodium (COLACE) 100 MG capsule                Procedures and tests performed during your visit     Procedure/Test Number of Times Performed    Basic metabolic panel 1    CBC with platelets differential 1    HCG quantitative pregnancy 1    Hepatic panel 1    Peripheral IV: Standard 2    Routine UA with microscopic 1    US OB < 14 Weeks w Transvaginal 1      Orders Needing Specimen Collection     None      Pending Results     No orders found from 10/4/2018 to 10/7/2018.            Pending Culture Results     No orders found from 10/4/2018 to 10/7/2018.            Pending Results Instructions     If you had any lab results that were not finalized at the time of your Discharge, you can call the ED Lab Result RN at [phone]. You will be contacted by this team for any positive Lab results or changes in treatment. The nurses are available 7 days a week from 10A to 6:30P.  You can leave a message 24 hours per day and they will return your call.        Test Results From Your Hospital Stay        10/6/2018  5:29 AM      Component Results     Component Value Ref Range & Units Status    Color Urine Yellow  Final    Appearance Urine Clear  Final    Glucose Urine Negative NEG^Negative mg/dL Final    Bilirubin Urine Negative NEG^Negative Final    Ketones Urine 80 (A) NEG^Negative  mg/dL Final    Specific Gravity Urine 1.026 1.003 - 1.035 Final    Blood Urine Moderate (A) NEG^Negative Final    pH Urine 5.0 5.0 - 7.0 pH Final    Protein Albumin Urine Negative NEG^Negative mg/dL Final    Urobilinogen mg/dL 0.0 0.0 - 2.0 mg/dL Final    Nitrite Urine Negative NEG^Negative Final    Leukocyte Esterase Urine Negative NEG^Negative Final    Source Midstream Urine  Final    WBC Urine 1 0 - 5 /HPF Final    RBC Urine 1 0 - 2 /HPF Final    Bacteria Urine Few (A) NEG^Negative /HPF Final    Squamous Epithelial /HPF Urine <1 0 - 1 /HPF Final    Mucous Urine Present (A) NEG^Negative /LPF Final         10/6/2018  5:45 AM      Component Results     Component Value Ref Range & Units Status    WBC 15.8 (H) 4.0 - 11.0 10e9/L Final    RBC Count 4.44 3.8 - 5.2 10e12/L Final    Hemoglobin 13.2 11.7 - 15.7 g/dL Final    Hematocrit 39.8 35.0 - 47.0 % Final    MCV 90 78 - 100 fl Final    MCH 29.7 26.5 - 33.0 pg Final    MCHC 33.2 31.5 - 36.5 g/dL Final    RDW 12.4 10.0 - 15.0 % Final    Platelet Count 283 150 - 450 10e9/L Final    Diff Method Automated Method  Final    % Neutrophils 74.3 % Final    % Lymphocytes 18.0 % Final    % Monocytes 5.0 % Final    % Eosinophils 2.0 % Final    % Basophils 0.3 % Final    % Immature Granulocytes 0.4 % Final    Nucleated RBCs 0 0 /100 Final    Absolute Neutrophil 11.8 (H) 1.6 - 8.3 10e9/L Final    Absolute Lymphocytes 2.9 0.8 - 5.3 10e9/L Final    Absolute Monocytes 0.8 0.0 - 1.3 10e9/L Final    Absolute Eosinophils 0.3 0.0 - 0.7 10e9/L Final    Absolute Basophils 0.1 0.0 - 0.2 10e9/L Final    Abs Immature Granulocytes 0.1 0 - 0.4 10e9/L Final    Absolute Nucleated RBC 0.0  Final         10/6/2018  6:02 AM      Component Results     Component Value Ref Range & Units Status    Sodium 138 133 - 144 mmol/L Final    Potassium 3.2 (L) 3.4 - 5.3 mmol/L Final    Chloride 105 94 - 109 mmol/L Final    Carbon Dioxide 24 20 - 32 mmol/L Final    Anion Gap 9 3 - 14 mmol/L Final    Glucose 107 (H)  70 - 99 mg/dL Final    Urea Nitrogen 6 (L) 7 - 30 mg/dL Final    Creatinine 0.49 (L) 0.52 - 1.04 mg/dL Final    GFR Estimate >90 >60 mL/min/1.7m2 Final    Non  GFR Calc    GFR Estimate If Black >90 >60 mL/min/1.7m2 Final    African American GFR Calc    Calcium 8.7 8.5 - 10.1 mg/dL Final         10/6/2018  6:02 AM      Component Results     Component Value Ref Range & Units Status    Bilirubin Direct 0.1 0.0 - 0.2 mg/dL Final    Bilirubin Total 0.4 0.2 - 1.3 mg/dL Final    Albumin 3.9 3.4 - 5.0 g/dL Final    Protein Total 7.7 6.8 - 8.8 g/dL Final    Alkaline Phosphatase 100 40 - 150 U/L Final    ALT 21 0 - 50 U/L Final    AST 20 0 - 45 U/L Final         10/6/2018  7:27 AM      Narrative     US OB <14 WEEKS WITH TRANSVAGINAL SINGLE  10/6/2018 6:25 AM     INDICATION: Pain.    COMPARISON: None.    FINDINGS: Transabdominal and transvaginal imaging were performed.  Transvaginal imaging was performed to better evaluate early pregnancy.  There is an intrauterine gestational sac with yolk sac. This measures  5 weeks and 2 days by mean sac diameter. No fetal pole or cardiac  activity is seen. Right ovary negative. Corpus luteal cyst left ovary  measuring 3.4 cm. No suspicious adnexal masses. Trace free fluid.        Impression     IMPRESSION:  1. Intrauterine gestational sac and yolk sac consistent with a 5 weeks  2 days gestation.  2. This may be a normal early IUP. Fetal demise could not be excluded  at this point.  3. Recommend correlation with serial quantitative beta-hCG levels and  short-term follow-up ultrasound for further evaluation.    PERFECTO MULLIGAN MD         10/6/2018  7:30 AM      Component Results     Component Value Ref Range & Units Status    HCG Quantitative Serum 3357 (H) 0 - 5 IU/L Final    Specimen run with a dilution                Clinical Quality Measure: Blood Pressure Screening     Your blood pressure was checked while you were in the emergency department today. The last reading we  "obtained was  BP: 125/70 . Please read the guidelines below about what these numbers mean and what you should do about them.  If your systolic blood pressure (the top number) is less than 120 and your diastolic blood pressure (the bottom number) is less than 80, then your blood pressure is normal. There is nothing more that you need to do about it.  If your systolic blood pressure (the top number) is 120-139 or your diastolic blood pressure (the bottom number) is 80-89, your blood pressure may be higher than it should be. You should have your blood pressure rechecked within a year by a primary care provider.  If your systolic blood pressure (the top number) is 140 or greater or your diastolic blood pressure (the bottom number) is 90 or greater, you may have high blood pressure. High blood pressure is treatable, but if left untreated over time it can put you at risk for heart attack, stroke, or kidney failure. You should have your blood pressure rechecked by a primary care provider within the next 4 weeks.  If your provider in the emergency department today gave you specific instructions to follow-up with your doctor or provider even sooner than that, you should follow that instruction and not wait for up to 4 weeks for your follow-up visit.        Thank you for choosing [facility]       Thank you for choosing [facility] for your care. Our goal is always to provide you with excellent care. Hearing back from our patients is one way we can continue to improve our services. Please take a few minutes to complete the written survey that you may receive in the mail after you visit with us. Thank you!        [facility] Information     [facility] lets you send messages to your doctor, view your test results, renew your prescriptions, schedule appointments and more. To sign up, go to [email or web address] . Click on \"Log in\" on the left side of the screen, which will take you to the Welcome page. Then click on \"Sign up Now\" on the " right side of the page.     You will be asked to enter the access code listed below, as well as some personal information. Please follow the directions to create your username and password.     Your access code is: [email or web address]  Expires: 2018  5:59 PM     Your access code will  in 90 days. If you need help or a new code, please call your [facility] clinic or [phone].        Care EveryWhere ID     This is your Care EveryWhere ID. This could be used by other organizations to access your [facility] medical records  [id number]        Equal Access to Services     [facility] : Eusebio Magdaleno, chang romo, shai bruce, edis middleton . So [facility] [phone].    ATENCIÓN: Si habla español, tiene a giles disposición servicios gratuitos de asistencia lingüística. Llame al [phone].    We comply with applicable federal civil rights laws and [place] laws. We do not discriminate on the basis of race, color, national origin, age, disability, sex, sexual orientation, or gender identity.            After Visit Summary       This is your record. Keep this with you and show to your community pharmacist(s) and doctor(s) at your next visit.

## 2018-10-06 NOTE — DISCHARGE INSTRUCTIONS
Dolor Abdominal    El dolor abdominal es dolor en el vientre o en la herbert del intestino. Todo el sofia tiene hernando tipo de dolor de vez en cuando. En muchos casos el dolor desaparece por sí solo. Arianna en ciertas ocasiones el dolor abdominal puede ser consecuencia de un problema grave, kerline por ejemplo kelle apendicitis. Por lo tanto, es importante saber cuándo se debe obtener ayuda.  Causas del dolor abdominal  El dolor abdominal puede tener muchas causas. Entre las causas más comunes, en los adultos, se encuentran las siguientes:     Estreñimiento, diarrea o gases    Reflujo gastroesofágico (desplazamiento del ácido estomacal hacia el esófago, también llamado  reflujo ácido  o  ardor estomacal )    Úlcera péptica (lesión en el revestimiento interno del estómago o del intestino cintron)    Inflamación de la vesícula biliar, el hígado o el páncreas    Cálculos biliares o renales    Apendicitis    Obstrucción del intestino    Hernia (protrusión o abultamiento de un órgano interno a través de un músculo u otro tejido)    Infecciones de las vías urinarias    En las mujeres, cólicos menstruales, fibromas o endometriosis del útero    Inflamación o infección del intestino  Diagnóstico de la causa del dolor abdominal  Giles proveedor de atención médica le hará un examen para ayudar a determinar la causa de giles dolor. En jojo necesario, le harán ciertas pruebas. El dolor abdominal puede ser difícil de diagnosticar porque abhijit causas pueden ser muy diversas. Los detalles que usted sepa tomasz acerca de giles dolor pueden resultar útiles. Diga a giles proveedor de atención médica dónde y cuándo siente el dolor y qué cosas lo alivian o lo empeoran. Mencione también si tiene otros síntomas kerline fiebre, cansancio, náuseas, vómito o cambios en la frecuencia con que evacúa abhijit intestinos o giles vejiga.  Tratamiento del dolor abdominal  Ciertas causas de hernando dolor, kerline kelle apendicitis o kelle obstrucción intestinal, requieren tratamiento  urgente. Otros problemas pueden tratarse mediante descanso, consumo de líquidos o medicamentos. Giles proveedor de atención médica le dará instrucciones específicas para el tratamiento que debe seguir o cómo debe cuidarse según la causa de giles dolor.   Si tiene vómito o diarrea, key pequeños sorbos de agua u otros líquidos aaron. Cuando esté listo para comer de nuevo alimentos sólidos, empiece comiendo pequeñas cantidades de cosas fáciles de digerir, kerline puré de manzanas, pan donnie o galletas saladas.   Cuándo debe llamar al médico  Llame al 911 o vaya al [facility] si tiene alguno de los siguientes síntomas:    No puede defecar y está vomitando    Está vomitando kev o tiene diarrea de color negruzco o muy oscuro    Tiene también dolor en el pecho, en el johnie o en el hombro    Siente que está a punto de desmayarse    Tiene dolor en los omóplatos, con náuseas    Tiene un dolor repentino e insoportable en el abdomen    Tiene un nuevo dolor distinto de todos los reza que ha tenido antes    Tiene el vientre rígido, lisa y sensible al tacto  Llame a giles médico si tiene:    Dolor grant más de 5 días    Abotargamiento (sensación de llenura e inflamiento) grant más de 2 días    Diarrea grant más de 5 días    Fiebre superior a 101 F o más    Dolor que continúa aumentando    Pérdida de peso sin motivo aparente    Falta de apetito persistente    Kev en las heces  Cómo prevenir el dolor abdominal  Estos son algunos consejos para ayudar a prevenir el dolor abdominal:    Coma cantidades más pequeñas de alimentos en cada comida.    Evite los alimentos fritos o que contengan mucha grasa.    Evite los alimentos que le producen gas.    Mallika ejercicio con regularidad.    Key abundantes líquidos.  Para ayudar a prevenir los síntomas del reflujo gastroesofágico:    Deje de fumar.    Key menos alcohol y coma menos de esos alimentos que aumentan giles acidez estomacal.    Pierda el exceso de peso.    Termine de  comer kerline mínimo 2 horas antes de acostarse.    Eleve un poco la cabecera de giles cama.  Date Last Reviewed: 3/30/2014    [id number] [employer] [address] [place] [address], [place] [address]. Todos los derechos reservados. Esta información no pretende sustituir la atención médica profesional. Sólo giles médico puede diagnosticar y tratar un problema de melissa.

## 2018-10-06 NOTE — ED PROVIDER NOTES
History     Chief Complaint:  Abdominal Pain    HPI  HPI translated using an iPad  (ID [id number]) as the patient is [place]-speaking.  Katelyn Hollingsworth is an otherwise healthy A0 23 year old female who presents with abdominal pain. The patient was seen here on 18 after experiencing some vaginal bleeding and was found to be pregnant (beta-), ultrasound results below. She states she had been having some abdominal pain at that time, but notes it subsided shortly after returning home. Then about two days ago, the patient states she began experiencing some pressure in her lower abdomen and lower back again. She cannot remember anything in particular that seemed to bring the pain on, but states that lifting her two children or other heavy objects seems to exacerbate her pain. However, since the pain continued to persist, she came to the ED this morning. On presentation to the ED, the patient also notes she does not feel she can totally empty her bladder recently and has some residual pain after urinating. She reports her last bowel movement was on 18, and states she struggles with constipation intermittently, so this is not totally abnormal for her. She denies any recent fevers, vaginal bleeding, or other acute symptoms. Of note, the patient states she has not seen an OB/GYN for this pregnancy yet as she does not have a car.     18 US OB<14 Weeks W Transvaginal  1. No evidence of intrauterine pregnancy. In light of positive  pregnancy test, ectopic pregnancy is not excluded. Recommend continued  follow-up of serial serum beta-hCG levels. Recent spontaneous   or pregnancy too early to detect by ultrasound also in the  differential diagnosis.  2. A 3.3 x 2.8 cm left ovarian cyst.  As read by Radiology.    Allergies:  No known drug allergies    Medications:    The patient is not currently taking any prescribed medications.     Past Medical History:    The patient does not have  any past pertinent medical history.    Past Surgical History:    History reviewed. No pertinent surgical history.    Family History:    History reviewed. No pertinent family history.     Social History:  Smoking status: No  Alcohol use: No  Marital Status:  [2]     Review of Systems   Constitutional: Negative for chills and fever.   Gastrointestinal: Positive for abdominal pain and constipation. Negative for diarrhea, nausea and vomiting.   Genitourinary: Positive for decreased urine volume and dysuria. Negative for vaginal bleeding.   Musculoskeletal: Positive for back pain.   All other systems reviewed and are negative.    Physical Exam   Patient Vitals for the past 24 hrs:   BP Temp Temp src Pulse Heart Rate Resp SpO2   10/06/18 0442 125/70 97.9  F (36.6  C) Oral 100 100 18 98 %     Physical Exam  General: The patient is alert, in no respiratory distress.    HENT: Mucous membranes moist.    Cardiovascular: Regular rate and rhythm. Good pulses in all four extremities. Normal capillary refill and skin turgor.     Respiratory: Lungs are clear. No nasal flaring. No retractions. No wheezing, no crackles.    Gastrointestinal: Mild left-sided abdominal tenderness. Abdomen otherwise soft. No guarding, no rebound. No palpable hernias.     : No bleeding. Whitish discharge, likely yeast.    Musculoskeletal: No gross deformity.     Skin: No rashes or petechiae.     Neurologic: The patient is alert and oriented x3. GCS 15. No testable cranial nerve deficit. Follows commands with clear and appropriate speech. Gives appropriate answers. Good strength in all extremities. No gross neurologic deficit. Gross sensation intact. Pupils are round and reactive. No meningismus.     Lymphatic: No cervical adenopathy. No lower extremity swelling.    Psychiatric: The patient is non-tearful.    Emergency Department Course   Imaging:  Radiographic findings were communicated with the patient who voiced understanding of the  findings.    US OB<14 weeks w Transvaginal:  1. Intrauterine gestational sac and yolk sac consistent with a 5 weeks  2 days gestation.  2. This may be a normal early IUP. Fetal demise could not be excluded  at this point.  3. Recommend correlation with serial quantitative beta-hCG levels and  short-term follow-up ultrasound for further evaluation.  As read by Radiology.    Laboratory:  CBC: WBC 15.8 (H), o/w WNL (HGB 13.2, )  BMP: Potassium 3.2 (L), Glucose 107 (H), BUN 6 (L), Creatinine 0.49 (L), o/w WNL  Hepatic panel: Bilirubin 0.1, bilirubin total 0.4, albumin 3.9, protein total 7.7, alkaline phosphatase 100, ALT 21, AST 20  HCG quant: 3357 (H)  UA: Moderate blood, ketones 80, few bacteria, mucous present, o/w negative    Interventions:  0540: 20 mg Pepcid IV  0540: GI Cocktail - Maalox 15 mL, Viscous Lidocaine 15 mL, 30 mL suspension PO    Emergency Department Course:  Past medical records, nursing notes, and vitals reviewed.  0616: I performed an exam of the patient and obtained history, as documented above.  IV inserted and blood drawn. UA performed, findings above.  The patient was sent for an OB/GYN ultrasound while in the emergency department, findings above. Given her abdominal pain, we also discussed the risk/benefit ratio of obtaining a CT scan to evaluate for other causes for her pain, and the patient elected not to at this time.    0735: I rechecked the patient. Explained findings to the patient via the . The patient is requesting a pelvic exam at this time.    0758: I performed a pelvic exam, findings above.    I rechecked the patient. Findings and plan explained to the patient. Patient discharged home with instructions regarding supportive care, medications, and reasons to return. The importance of close follow-up was reviewed.     Impression & Plan    Medical Decision Making:  Katelyn Hollingsworth is a 23 year old female who presents to the ED for evaluation of abdominal  pain. She had been seen recently here in the ER for threatened miscarriage, and had vaginal bleeding and some associated pain, but it sounds like that pain had resolved. However, two days ago, she developed left-sided abdominal pain associated with constipation and mild tenderness. I did discuss her case with her via the  on several occasions and performed a pelvic exam. I think vaginitis is likely the case of her vaginal discomfort. There is no active bleeding. Her quantitative HCG is raised adequately, indicating an early intrauterine pregnancy. I do not feel the patient has appendicitis, bowel obstruction, ovarian torsion, or another issue. I discussed the findings with the patient, and she will forgo CT at this time, which I feel is reasonable. Her white count has slightly gone up, likely secondary to pain. The patient is otherwise stable here. All of her questions were answered and I reviewed the labs. She was discharged home in good condition and asked to follow-up with an OB/GYN.    Diagnosis:    ICD-10-CM   1. Abdominal pain, left lateral R10.9   2. Constipation, unspecified constipation type K59.00   3. Candidiasis of vagina B37.3   4. Pregnancy, incidental Z33.1     Disposition: Discharged to home    Discharge Medications:  New Prescriptions    CLOTRIMAZOLE (LOTRIMIN) 1 % CREAM    Place 1 Applicatorful vaginally At Bedtime for 7 days    DOCUSATE SODIUM (COLACE) 100 MG CAPSULE    Take 1 capsule (100 mg) by mouth 3 times daily as needed for constipation     Zeenat Grey  10/6/2018   [facility] EMERGENCY DEPARTMENT    I, Zeenat Grey, am serving as a scribe at 6:16 AM on 10/6/2018 to document services personally performed by Yifan Salinas MD based on my observations and the provider's statements to me.      Yifan Salinas MD  10/06/18 6756

## 2018-10-06 NOTE — ED TRIAGE NOTES
Patient here for intermittent left lower quadrant pain and pressure ongoing for 2-3 days, currently 5 weeks pregnant associated with nausea but no vomit, vaginal itchiness, and sore throat. Did had vaginal bleeding and 9/30 and was seen here and bleeding did resolve when patient was discharge that day. Also concern for STD exposure. ABCs intact.

## 2018-10-21 ENCOUNTER — APPOINTMENT (OUTPATIENT)
Dept: ULTRASOUND IMAGING | Facility: CLINIC | Age: 23
End: 2018-10-21
Attending: EMERGENCY MEDICINE
Payer: COMMERCIAL

## 2018-10-21 ENCOUNTER — HOSPITAL ENCOUNTER (EMERGENCY)
Facility: CLINIC | Age: 23
Discharge: HOME | End: 2018-10-22
Attending: EMERGENCY MEDICINE | Admitting: EMERGENCY MEDICINE
Payer: COMMERCIAL

## 2018-10-21 DIAGNOSIS — O26.899 PREGNANCY RELATED BILATERAL LOWER ABDOMINAL PAIN, ANTEPARTUM: ICD-10-CM

## 2018-10-21 DIAGNOSIS — R10.30 PREGNANCY RELATED BILATERAL LOWER ABDOMINAL PAIN, ANTEPARTUM: ICD-10-CM

## 2018-10-21 LAB
ALBUMIN UR-MCNC: NEGATIVE MG/DL
ANION GAP SERPL CALCULATED.3IONS-SCNC: 8 MMOL/L (ref 3–14)
APPEARANCE UR: CLEAR
BACTERIA #/AREA URNS HPF: ABNORMAL /HPF
BASOPHILS # BLD AUTO: 0 10E9/L (ref 0–0.2)
BASOPHILS NFR BLD AUTO: 0.2 %
BILIRUB UR QL STRIP: NEGATIVE
BUN SERPL-MCNC: 7 MG/DL (ref 7–30)
CALCIUM SERPL-MCNC: 8.6 MG/DL (ref 8.5–10.1)
CHLORIDE SERPL-SCNC: 105 MMOL/L (ref 94–109)
CO2 SERPL-SCNC: 22 MMOL/L (ref 20–32)
COLOR UR AUTO: YELLOW
CREAT SERPL-MCNC: 0.53 MG/DL (ref 0.52–1.04)
DIFFERENTIAL METHOD BLD: ABNORMAL
EOSINOPHIL # BLD AUTO: 0 10E9/L (ref 0–0.7)
EOSINOPHIL NFR BLD AUTO: 0.2 %
ERYTHROCYTE [DISTWIDTH] IN BLOOD BY AUTOMATED COUNT: 12.2 % (ref 10–15)
GFR SERPL CREATININE-BSD FRML MDRD: >90 ML/MIN/1.7M2
GLUCOSE SERPL-MCNC: 99 MG/DL (ref 70–99)
GLUCOSE UR STRIP-MCNC: NEGATIVE MG/DL
HCT VFR BLD AUTO: 41.7 % (ref 35–47)
HGB BLD-MCNC: 13.6 G/DL (ref 11.7–15.7)
HGB UR QL STRIP: NEGATIVE
IMM GRANULOCYTES # BLD: 0.1 10E9/L (ref 0–0.4)
IMM GRANULOCYTES NFR BLD: 0.4 %
KETONES UR STRIP-MCNC: 20 MG/DL
LEUKOCYTE ESTERASE UR QL STRIP: NEGATIVE
LYMPHOCYTES # BLD AUTO: 0.5 10E9/L (ref 0.8–5.3)
LYMPHOCYTES NFR BLD AUTO: 4.2 %
MCH RBC QN AUTO: 29.3 PG (ref 26.5–33)
MCHC RBC AUTO-ENTMCNC: 32.6 G/DL (ref 31.5–36.5)
MCV RBC AUTO: 90 FL (ref 78–100)
MONOCYTES # BLD AUTO: 0.3 10E9/L (ref 0–1.3)
MONOCYTES NFR BLD AUTO: 2.7 %
MUCOUS THREADS #/AREA URNS LPF: PRESENT /LPF
NEUTROPHILS # BLD AUTO: 11.4 10E9/L (ref 1.6–8.3)
NEUTROPHILS NFR BLD AUTO: 92.3 %
NITRATE UR QL: NEGATIVE
NRBC # BLD AUTO: 0 10*3/UL
NRBC BLD AUTO-RTO: 0 /100
PH UR STRIP: 5 PH (ref 5–7)
PLATELET # BLD AUTO: 290 10E9/L (ref 150–450)
POTASSIUM SERPL-SCNC: 3.8 MMOL/L (ref 3.4–5.3)
RBC # BLD AUTO: 4.64 10E12/L (ref 3.8–5.2)
RBC #/AREA URNS AUTO: 3 /HPF (ref 0–2)
SODIUM SERPL-SCNC: 135 MMOL/L (ref 133–144)
SOURCE: ABNORMAL
SP GR UR STRIP: 1.01 (ref 1–1.03)
SPECIMEN SOURCE: NORMAL
SQUAMOUS #/AREA URNS AUTO: 1 /HPF (ref 0–1)
UROBILINOGEN UR STRIP-MCNC: 0 MG/DL (ref 0–2)
WBC # BLD AUTO: 12.3 10E9/L (ref 4–11)
WBC #/AREA URNS AUTO: 3 /HPF (ref 0–5)
WET PREP SPEC: NORMAL

## 2018-10-21 PROCEDURE — 99285 EMERGENCY DEPT VISIT HI MDM: CPT | Mod: 25

## 2018-10-21 PROCEDURE — 87491 CHLMYD TRACH DNA AMP PROBE: CPT | Performed by: EMERGENCY MEDICINE

## 2018-10-21 PROCEDURE — 93976 VASCULAR STUDY: CPT

## 2018-10-21 PROCEDURE — 25000128 H RX IP 250 OP 636: Performed by: EMERGENCY MEDICINE

## 2018-10-21 PROCEDURE — 96374 THER/PROPH/DIAG INJ IV PUSH: CPT

## 2018-10-21 PROCEDURE — 76801 OB US < 14 WKS SINGLE FETUS: CPT

## 2018-10-21 PROCEDURE — 96361 HYDRATE IV INFUSION ADD-ON: CPT

## 2018-10-21 PROCEDURE — 81001 URINALYSIS AUTO W/SCOPE: CPT | Performed by: EMERGENCY MEDICINE

## 2018-10-21 PROCEDURE — 87591 N.GONORRHOEAE DNA AMP PROB: CPT | Performed by: EMERGENCY MEDICINE

## 2018-10-21 PROCEDURE — 80048 BASIC METABOLIC PNL TOTAL CA: CPT | Performed by: EMERGENCY MEDICINE

## 2018-10-21 PROCEDURE — 25000132 ZZH RX MED GY IP 250 OP 250 PS 637: Performed by: EMERGENCY MEDICINE

## 2018-10-21 PROCEDURE — 85025 COMPLETE CBC W/AUTO DIFF WBC: CPT | Performed by: EMERGENCY MEDICINE

## 2018-10-21 PROCEDURE — 87210 SMEAR WET MOUNT SALINE/INK: CPT | Performed by: EMERGENCY MEDICINE

## 2018-10-21 RX ORDER — ONDANSETRON 4 MG/1
4 TABLET, ORALLY DISINTEGRATING ORAL EVERY 8 HOURS PRN
Qty: 10 TABLET | Refills: 0 | Status: SHIPPED | OUTPATIENT
Start: 2018-10-21 | End: 2018-11-29

## 2018-10-21 RX ORDER — ACETAMINOPHEN 500 MG
1000 TABLET ORAL EVERY 6 HOURS PRN
Qty: 60 TABLET | Refills: 0 | Status: SHIPPED | OUTPATIENT
Start: 2018-10-21 | End: 2018-10-28

## 2018-10-21 RX ORDER — ONDANSETRON 2 MG/ML
4 INJECTION INTRAMUSCULAR; INTRAVENOUS ONCE
Status: COMPLETED | OUTPATIENT
Start: 2018-10-21 | End: 2018-10-21

## 2018-10-21 RX ORDER — ACETAMINOPHEN 325 MG/1
975 TABLET ORAL ONCE
Status: COMPLETED | OUTPATIENT
Start: 2018-10-21 | End: 2018-10-21

## 2018-10-21 RX ADMIN — DEXTROSE AND SODIUM CHLORIDE: 5; 900 INJECTION, SOLUTION INTRAVENOUS at 21:55

## 2018-10-21 RX ADMIN — ONDANSETRON 4 MG: 2 INJECTION INTRAMUSCULAR; INTRAVENOUS at 21:53

## 2018-10-21 RX ADMIN — ACETAMINOPHEN 975 MG: 325 TABLET, FILM COATED ORAL at 21:53

## 2018-10-21 ASSESSMENT — ENCOUNTER SYMPTOMS
FREQUENCY: 1
DIARRHEA: 0
NAUSEA: 1
ABDOMINAL PAIN: 1
VOMITING: 1
DYSURIA: 0
BACK PAIN: 1
CONSTIPATION: 1

## 2018-10-21 NOTE — ED AVS SNAPSHOT
[facility] Emergency Department    [address]    [facility] [address]    Phone:  [phone]    Fax:  [phone]                                       Katelyn Hollingsworth   MRN: [id number]    Department:  [facility] Emergency Department   Date of Visit:  10/21/2018           Patient Information     Date Of Birth          1995        Your diagnoses for this visit were:     Pregnancy related bilateral lower abdominal pain, antepartum        You were seen by Matthew Lucas MD.      Follow-up Information     Schedule an appointment as soon as possible for a visit with Outside OB Provider .    Why:  follow up stomach pain        Follow up with [facility] Emergency Department.    Specialty:  EMERGENCY MEDICINE    Why:  As needed, If symptoms worsen    Contact information:    [address]  [facility] [address]  [phone]        Discharge Instructions         Dolor abdominal y principio del embarazo    (Para descartar un embarazo ectópico o un aborto espontáneo)  Nuestras pruebas muestran que usted está embarazada, mckenna no sabemos con certeza a qué se debe giles dolor.  Algo de dolor y sangrado es común al principio de embarazo. Con frecuencia esto suele desaparecer y usted puede continuar giles embarazo de manera normal y tener un bebé saludable. En otros casos, el dolor o el sangrado pueden ser signos de un aborto espontáneo o de un embarazo ectópico. Un embarazo ectópico es un problema muy grave. En hernando momento, no sabemos con seguridad si giles embarazo continuará de manera normal, si tendrá un aborto espontáneo o si podría tener un embarazo ectópico. A continuación, verá más información sobre esto.  Aborto espontáneo  En hernando momento, no sabemos si tendrá un aborto espontáneo o si las cosas mejorarán y giles embarazo continuará con normalidad. Entendemos que esto es emocionalmente difícil. Es muy poco lo que podemos decir respecto de la  forma en que se siente. Arianna debe saber que los abortos espontáneos son algo común.  Alrededor de zulema o dos de cada kelly embarazos terminan de hernando modo. Algunos terminan incluso antes de que usted sepa que está embarazada. [place] suele suceder por diferentes razones, y, por lo general, nunca se conocen las causas exactas. Es importante que sepa que no es giles culpa. No se debe a que usted haya hecho algo mal.  Tener relaciones sexuales o hacer actividad física no son cosas que provoquen un aborto espontáneo. Estas actividades suelen ser seguras a menos que sienta dolor o tenga sangrado, o que giles médico le diga que no lo reece. Incluso las caídas menores no causan un aborto espontáneo. Los abortos espontáneos suceden cuando las cosas no se desarrollan kerline deberían haberlo hecho. No hay ningún medicamento que pueda prevenir un aborto espontáneo.  Embarazo ectópico  En un embarazo normal, los óvulos fertilizados se adhieren a las ross del útero. En un embarazo tubárico o ectópico, el óvulo fertilizado se adhiere fuera del útero, por lo general en kelle de las trompas de [place]. Muy loren vez, el óvulo se adhiere a un ovario o a alguna otra parte del abdomen. Un embarazo ectópico es mucho menos común que un aborto espontáneo, arianna es muy grave. El bebé no podrá sobrevivir, ya que a medida que va creciendo puede romper la trompa. [place] puede causar sangrado interno e, incluso, la muerte. Los factores que aumentan el riesgo de tener un embarazo ectópico:    Un embarazo ectópico en el pasado    Enfermedad inflamatoria pélvica (EIP)    Endometriosis    Fumar    Un DIU  Otras pruebas  Dado que no sabemos a qué se deben abhijit síntomas, necesitará más pruebas para ayudar a que giles médico descubra cuál es el problema. Puede que necesite:  Ecografía  Kelle ecografía suele poder confirmar si el embarazo es normal ya a partir de la cuarta o quinta semana. Si el embarazo no muestra el bebé dentro del útero, significa que:    Puede que giles  embarazo sea normal y tenga menos de cuatro semanas, o    Tenga o haya tenido recientemente un embarazo ectópico, o    Tiene un embarazo ectópico  HCG cuantitativa  Elvia análisis mide la cantidad que hay en giles kev de la hormona del embarazo. Los resultados de hoy deberían compararse con los de un análisis que le katie en dos días para saber si giles embarazo es normal.  Laparoscopia  Es un tipo de cirugía. El médico le colocará un tubo con praveen dentro de giles abdomen para mirar directamente los órganos que están en la herbert de giles pelvis. Elvia análisis se usa cuando no es seguro esperar dos wily hasta los resultados del análisis de kev.     Información importante  Si tiene un embarazo ectópico, existe la posibilidad de que el feto, al crecer, rompa giles trompa de [place]. [place] puede ocasionar kelle hemorragia interna grave. En jojo de que esto suceda, puede tener:    Dolor muy tania y repentino en la parte inferior del abdomen    Sangrado vaginal    Debilidad, mareo y, en ocasiones, desmayo  Si tiene cualquiera de estos síntomas:    Llame al 911 o regrese inmediatamente al hospital.    No vaya conduciendo usted.    No vaya al consultorio de giles médico o a kelle clínica: vaya al hospital.      Cuidados en la casa  Siga estos consejos para cuidar de usted en giles casa:    Descanse hasta giles próximo análisis. No reece ninguna actividad que requiera mucho esfuerzo.    Siga kelle dieta con alimentos livianos, fáciles de digerir.    No tenga relaciones sexuales hasta que giles médico la autorice.  Visita de control  Coordine kelle lucila con giles médico para repetir el análisis de kev. Si le dijeron que debía repetirse el análisis de kev en dos días, es importante que se lo reece.  Si le hicieron kelle radiografía o kelle ecografía, un radiólogo las revisará. Le informarán de los nuevos hallazgos que puedan afectar giles atención médica.  Llame al 911  Llame al 911 si presenta cualquiera de estos síntomas:    Tiene dolor tania y sangrado muy  abundante    Se siente muy aturdida o se desmaya    Ritmo cardíaco acelerado    Dificultades para respirar    Se siente confundida o tiene dificultades para despertarse   Cuándo debe buscar atención médica?  Llame a giles proveedor de atención médica de inmediato si tiene cualquiera de los siguientes síntomas:    El dolor en giles abdomen empeora, ya sea de manera repentina o gradual    Se marea o se siente débil al ponerse de pie    Tiene sangrado vaginal muy abundante. [place] significa que empapa kelle toallita sanitaria cada hora en yvonne horas    Ha tenido sangrado vaginal por más de liseth wily    Tiene vómito o diarrea que se repiten    El dolor en giles abdomen pasa a la parte inferior derecha    Tiene kev en el vómito o al evacuar los intestinos. Si eso sucede, serán de color helms oscuro o antoine    Tiene fiebre de 100.4  F (38  C) o más, o según le haya indicado giles proveedor de atención médica  Date Last Reviewed: 4/21/2014 2000-2017 [employer]. [address] [place] [address]. Todos los derechos reservados. Esta información no pretende sustituir la atención médica profesional. Sólo giles médico puede diagnosticar y tratar un problema de melissa.          24 Hour Appointment Hotline       To make an appointment at any [facility] clinic, call [phone] ([phone]). If you don't have a family doctor or clinic, we will help you find one. [facility] clinics are conveniently located to serve the needs of you and your family.             Review of your medicines      START taking        Dose / Directions Last dose taken    acetaminophen 500 MG tablet   Commonly known as:  TYLENOL   Dose:  1000 mg   Quantity:  60 tablet        Take 2 tablets (1,000 mg) by mouth every 6 hours as needed for pain   Refills:  0        ondansetron 4 MG ODT tab   Commonly known as:  ZOFRAN ODT   Dose:  4 mg   Quantity:  10 tablet        Take 1 tablet (4 mg) by mouth every 8 hours as needed for nausea   Refills:  0           Our records show that you are taking the medicines listed below. If these are incorrect, please call your family doctor or clinic.        Dose / Directions Last dose taken    docusate sodium 100 MG capsule   Commonly known as:  COLACE   Dose:  100 mg   Quantity:  7 capsule        Take 1 capsule (100 mg) by mouth 3 times daily as needed for constipation   Refills:  0                Prescriptions were sent or printed at these locations (2 Prescriptions)                   Other Prescriptions                Printed at Department/Unit printer (2 of 2)         acetaminophen (TYLENOL) 500 MG tablet               ondansetron (ZOFRAN ODT) 4 MG ODT tab                Procedures and tests performed during your visit     Basic metabolic panel (BMP)    CBC + differential    Chlamydia trachomatis PCR    Neisseria gonorrhoeae PCR    UA with Microscopic    US Abdomen Pelvis Duplex Limited    US OB < 14 Weeks Single    Wet prep      Orders Needing Specimen Collection     None      Pending Results     Date and Time Order Name Status Description    10/21/2018 2127 Chlamydia trachomatis PCR In process     10/21/2018 2127 Neisseria gonorrhoeae PCR In process             Pending Culture Results     Date and Time Order Name Status Description    10/21/2018 2127 Chlamydia trachomatis PCR In process     10/21/2018 2127 Neisseria gonorrhoeae PCR In process             Pending Results Instructions     If you had any lab results that were not finalized at the time of your Discharge, you can call the ED Lab Result RN at [phone]. You will be contacted by this team for any positive Lab results or changes in treatment. The nurses are available 7 days a week from 10A to 6:30P.  You can leave a message 24 hours per day and they will return your call.        Test Results From Your Hospital Stay        10/21/2018  9:42 PM      Component Results     Component Value Ref Range & Units Status    WBC 12.3 (H) 4.0 - 11.0 10e9/L Final    RBC Count  4.64 3.8 - 5.2 10e12/L Final    Hemoglobin 13.6 11.7 - 15.7 g/dL Final    Hematocrit 41.7 35.0 - 47.0 % Final    MCV 90 78 - 100 fl Final    MCH 29.3 26.5 - 33.0 pg Final    MCHC 32.6 31.5 - 36.5 g/dL Final    RDW 12.2 10.0 - 15.0 % Final    Platelet Count 290 150 - 450 10e9/L Final    Diff Method Automated Method  Final    % Neutrophils 92.3 % Final    % Lymphocytes 4.2 % Final    % Monocytes 2.7 % Final    % Eosinophils 0.2 % Final    % Basophils 0.2 % Final    % Immature Granulocytes 0.4 % Final    Nucleated RBCs 0 0 /100 Final    Absolute Neutrophil 11.4 (H) 1.6 - 8.3 10e9/L Final    Absolute Lymphocytes 0.5 (L) 0.8 - 5.3 10e9/L Final    Absolute Monocytes 0.3 0.0 - 1.3 10e9/L Final    Absolute Eosinophils 0.0 0.0 - 0.7 10e9/L Final    Absolute Basophils 0.0 0.0 - 0.2 10e9/L Final    Abs Immature Granulocytes 0.1 0 - 0.4 10e9/L Final    Absolute Nucleated RBC 0.0  Final         10/21/2018  9:59 PM      Component Results     Component Value Ref Range & Units Status    Sodium 135 133 - 144 mmol/L Final    Potassium 3.8 3.4 - 5.3 mmol/L Final    Chloride 105 94 - 109 mmol/L Final    Carbon Dioxide 22 20 - 32 mmol/L Final    Anion Gap 8 3 - 14 mmol/L Final    Glucose 99 70 - 99 mg/dL Final    Urea Nitrogen 7 7 - 30 mg/dL Final    Creatinine 0.53 0.52 - 1.04 mg/dL Final    GFR Estimate >90 >60 mL/min/1.7m2 Final    Non  GFR Calc    GFR Estimate If Black >90 >60 mL/min/1.7m2 Final    African American GFR Calc    Calcium 8.6 8.5 - 10.1 mg/dL Final         10/21/2018 11:10 PM      Component Results     Component Value Ref Range & Units Status    Color Urine Yellow  Final    Appearance Urine Clear  Final    Glucose Urine Negative NEG^Negative mg/dL Final    Bilirubin Urine Negative NEG^Negative Final    Ketones Urine 20 (A) NEG^Negative mg/dL Final    Specific Gravity Urine 1.010 1.003 - 1.035 Final    Blood Urine Negative NEG^Negative Final    pH Urine 5.0 5.0 - 7.0 pH Final    Protein Albumin Urine  Negative NEG^Negative mg/dL Final    Urobilinogen mg/dL 0.0 0.0 - 2.0 mg/dL Final    Nitrite Urine Negative NEG^Negative Final    Leukocyte Esterase Urine Negative NEG^Negative Final    Source Midstream Urine  Final    WBC Urine 3 0 - 5 /HPF Final    RBC Urine 3 (H) 0 - 2 /HPF Final    Bacteria Urine Few (A) NEG^Negative /HPF Final    Squamous Epithelial /HPF Urine 1 0 - 1 /HPF Final    Mucous Urine Present (A) NEG^Negative /LPF Final         10/21/2018 10:58 PM      Narrative     US OBSTETRIC < 14 WEEKS SINGLE, US ABDOMEN OR PELVIS DOPPLER LIMITED  10/21/2018 10:34 PM     HISTORY: Abdominal pain.     TECHNIQUE: Transabdominal and transvaginal imaging were performed.  Transvaginal imaging was performed to better evaluate the uterus and  gestational sac.    COMPARISON:  None.    FINDINGS:      Estimated gestational age by current ultrasound measurement: 7 weeks 4  days.  Estimated date of delivery based on this ultrasound: 6/5/2019.  Crown-rump length: 1.3 cm.   Embryonic cardiac activity: 155 bpm.   Yolk sac: Present.  Subchorionic hemorrhage: None.    Right ovary: Unremarkable.  Left ovary: 3.5 cm corpus luteal cyst. Doppler waveform analysis  demonstrated normal blood flow to both ovaries.  Adnexal mass: None.  Free pelvic fluid: None.        Impression     IMPRESSION:   1. Single live intrauterine gestation measuring 7 weeks 4 days.  2. Left ovarian 3.5 cm corpus luteal cyst. Normal Doppler blood flow  to both ovaries.    MORGAN RODRIGUEZ MD         10/21/2018 10:18 PM      Component Results     Component    Specimen Description    Vagina    Wet Prep    Few  PMNs seen      Wet Prep    No Trichomonas seen    Wet Prep    No yeast seen    Wet Prep    No clue cells seen         10/21/2018  9:56 PM         10/21/2018  9:56 PM         10/21/2018 10:58 PM      Narrative     US OBSTETRIC < 14 WEEKS SINGLE, US ABDOMEN OR PELVIS DOPPLER LIMITED  10/21/2018 10:34 PM     HISTORY: Abdominal pain.     TECHNIQUE: Transabdominal  and transvaginal imaging were performed.  Transvaginal imaging was performed to better evaluate the uterus and  gestational sac.    COMPARISON:  None.    FINDINGS:      Estimated gestational age by current ultrasound measurement: 7 weeks 4  days.  Estimated date of delivery based on this ultrasound: 6/5/2019.  Crown-rump length: 1.3 cm.   Embryonic cardiac activity: 155 bpm.   Yolk sac: Present.  Subchorionic hemorrhage: None.    Right ovary: Unremarkable.  Left ovary: 3.5 cm corpus luteal cyst. Doppler waveform analysis  demonstrated normal blood flow to both ovaries.  Adnexal mass: None.  Free pelvic fluid: None.        Impression     IMPRESSION:   1. Single live intrauterine gestation measuring 7 weeks 4 days.  2. Left ovarian 3.5 cm corpus luteal cyst. Normal Doppler blood flow  to both ovaries.    MORGAN RODRIGUEZ MD                Clinical Quality Measure: Blood Pressure Screening     Your blood pressure was checked while you were in the emergency department today. The last reading we obtained was  BP: 141/85 . Please read the guidelines below about what these numbers mean and what you should do about them.  If your systolic blood pressure (the top number) is less than 120 and your diastolic blood pressure (the bottom number) is less than 80, then your blood pressure is normal. There is nothing more that you need to do about it.  If your systolic blood pressure (the top number) is 120-139 or your diastolic blood pressure (the bottom number) is 80-89, your blood pressure may be higher than it should be. You should have your blood pressure rechecked within a year by a primary care provider.  If your systolic blood pressure (the top number) is 140 or greater or your diastolic blood pressure (the bottom number) is 90 or greater, you may have high blood pressure. High blood pressure is treatable, but if left untreated over time it can put you at risk for heart attack, stroke, or kidney failure. You should have your  "blood pressure rechecked by a primary care provider within the next 4 weeks.  If your provider in the emergency department today gave you specific instructions to follow-up with your doctor or provider even sooner than that, you should follow that instruction and not wait for up to 4 weeks for your follow-up visit.        Thank you for choosing [facility]       Thank you for choosing [facility] for your care. Our goal is always to provide you with excellent care. Hearing back from our patients is one way we can continue to improve our services. Please take a few minutes to complete the written survey that you may receive in the mail after you visit with us. Thank you!        [employer] Information     [employer] lets you send messages to your doctor, view your test results, renew your prescriptions, schedule appointments and more. To sign up, go to [email or web address] . Click on \"Log in\" on the left side of the screen, which will take you to the Welcome page. Then click on \"Sign up Now\" on the right side of the page.     You will be asked to enter the access code listed below, as well as some personal information. Please follow the directions to create your username and password.     Your access code is: [email or web address]  Expires: 2018  5:59 PM     Your access code will  in 90 days. If you need help or a new code, please call your [facility] clinic or [phone].        Care EveryWhere ID     This is your Care EveryWhere ID. This could be used by other organizations to access your [facility] medical records  [id number]        Equal Access to Services     BART ZUNIGA : Hadii melba ku hadasho Soomaali, waaxda luqadaha, qaybta kaalmada adeegyada, edis mcmahan. So [facility] [phone].    ATENCIÓN: Si habla español, tiene a giles disposición servicios gratuitos de asistencia lingüística. Llame al [phone].    We comply with applicable federal civil rights laws and [place] laws. We do not " discriminate on the basis of race, color, national origin, age, disability, sex, sexual orientation, or gender identity.            After Visit Summary       This is your record. Keep this with you and show to your community pharmacist(s) and doctor(s) at your next visit.

## 2018-10-21 NOTE — ED AVS SNAPSHOT
[facility] Emergency Department    [address]    [facility] [address]    Phone:  [phone]    Fax:  [phone]                                       Katelyn Hollingsworth   MRN: [id number]    Department:  [facility] Emergency Department   Date of Visit:  10/21/2018           After Visit Summary Signature Page     I have received my discharge instructions, and my questions have been answered. I have discussed any challenges I see with this plan with the nurse or doctor.    ..........................................................................................................................................  Patient/Patient Representative Signature      ..........................................................................................................................................  Patient Representative Print Name and Relationship to Patient    ..................................................               ................................................  Date                                   Time    ..........................................................................................................................................  Reviewed by Signature/Title    ...................................................              ..............................................  Date                                               Time          22EPIC Rev 08/18

## 2018-10-21 NOTE — LETTER
October 22, 2018      To Whom It May Concern:      Katelyn Hollingsworth was seen in our Emergency Department today, 10/22/18.  I expect her condition to improve over the next day.  She may return to work/school when improved.    Sincerely,        Marc Medley RN

## 2018-10-22 VITALS
WEIGHT: 190 LBS | DIASTOLIC BLOOD PRESSURE: 62 MMHG | HEART RATE: 95 BPM | OXYGEN SATURATION: 99 % | RESPIRATION RATE: 16 BRPM | TEMPERATURE: 98.5 F | SYSTOLIC BLOOD PRESSURE: 105 MMHG

## 2018-10-22 LAB
C TRACH DNA SPEC QL NAA+PROBE: NEGATIVE
N GONORRHOEA DNA SPEC QL NAA+PROBE: NEGATIVE
SPECIMEN SOURCE: NORMAL
SPECIMEN SOURCE: NORMAL

## 2018-10-22 NOTE — DISCHARGE INSTRUCTIONS
Dolor abdominal y principio del embarazo    (Para descartar un embarazo ectópico o un aborto espontáneo)  Nuestras pruebas muestran que usted está embarazada, arianna no sabemos con certeza a qué se debe giles dolor.  Algo de dolor y sangrado es común al principio de embarazo. Con frecuencia esto suele desaparecer y usted puede continuar giles embarazo de manera normal y tener un bebé saludable. En otros casos, el dolor o el sangrado pueden ser signos de un aborto espontáneo o de un embarazo ectópico. Un embarazo ectópico es un problema muy grave. En hernando momento, no sabemos con seguridad si giles embarazo continuará de manera normal, si tendrá un aborto espontáneo o si podría tener un embarazo ectópico. A continuación, verá más información sobre esto.  Aborto espontáneo  En hernando momento, no sabemos si tendrá un aborto espontáneo o si las cosas mejorarán y giles embarazo continuará con normalidad. Entendemos que esto es emocionalmente difícil. Es muy poco lo que podemos decir respecto de la forma en que se siente. Arianna debe saber que los abortos espontáneos son algo común.  Alrededor de zulema o dos de cada kelly embarazos terminan de hernando modo. Algunos terminan incluso antes de que usted sepa que está embarazada. [place] suele suceder por diferentes razones, y, por lo general, nunca se conocen las causas exactas. Es importante que sepa que no es giles culpa. No se debe a que usted haya hecho algo mal.  Tener relaciones sexuales o hacer actividad física no son cosas que provoquen un aborto espontáneo. Estas actividades suelen ser seguras a menos que sienta dolor o tenga sangrado, o que giles médico le diga que no lo reece. Incluso las caídas menores no causan un aborto espontáneo. Los abortos espontáneos suceden cuando las cosas no se desarrollan kerlien deberían haberlo hecho. No hay ningún medicamento que pueda prevenir un aborto espontáneo.  Embarazo ectópico  En un embarazo normal, los óvulos fertilizados se adhieren a las ross del útero.  En un embarazo tubárico o ectópico, el óvulo fertilizado se adhiere fuera del útero, por lo general en kelle de las trompas de [place]. Muy loren vez, el óvulo se adhiere a un ovario o a alguna otra parte del abdomen. Un embarazo ectópico es mucho menos común que un aborto espontáneo, mckenna es muy grave. El bebé no podrá sobrevivir, ya que a medida que va creciendo puede romper la trompa. [place] puede causar sangrado interno e, incluso, la muerte. Los factores que aumentan el riesgo de tener un embarazo ectópico:    Un embarazo ectópico en el pasado    Enfermedad inflamatoria pélvica (EIP)    Endometriosis    Fumar    Un DIU  Otras pruebas  Dado que no sabemos a qué se deben abhijit síntomas, necesitará más pruebas para ayudar a que giles médico descubra cuál es el problema. Puede que necesite:  Ecografía  Kelle ecografía suele poder confirmar si el embarazo es normal ya a partir de la cuarta o quinta semana. Si el embarazo no muestra el bebé dentro del útero, significa que:    Puede que giles embarazo sea normal y tenga menos de cuatro semanas, o    Tenga o haya tenido recientemente un embarazo ectópico, o    Tiene un embarazo ectópico  HCG cuantitativa  Elvia análisis mide la cantidad que hay en giles kev de la hormona del embarazo. Los resultados de hoy deberían compararse con los de un análisis que le katie en dos días para saber si giles embarazo es normal.  Laparoscopia  Es un tipo de cirugía. El médico le colocará un tubo con praveen dentro de giles abdomen para mirar directamente los órganos que están en la herbert de giles pelvis. Elvia análisis se usa cuando no es seguro esperar dos wily hasta los resultados del análisis de kev.     Información importante  Si tiene un embarazo ectópico, existe la posibilidad de que el feto, al crecer, rompa giles trompa de [place]. [place] puede ocasionar kelle hemorragia interna grave. En jojo de que esto suceda, puede tener:    Dolor muy tania y repentino en la parte inferior del abdomen    Sangrado  vaginal    Debilidad, mareo y, en ocasiones, desmayo  Si tiene cualquiera de estos síntomas:    Llame al 911 o regrese inmediatamente al hospital.    No vaya conduciendo usted.    No vaya al consultorio de giles médico o a kelle clínica: vaya al hospital.      Cuidados en la casa  Siga estos consejos para cuidar de usted en giles casa:    Descanse hasta giles próximo análisis. No reece ninguna actividad que requiera mucho esfuerzo.    Siga kelle dieta con alimentos livianos, fáciles de digerir.    No tenga relaciones sexuales hasta que giles médico la autorice.  Visita de control  Coordine kelle lucila con giles médico para repetir el análisis de kev. Si le dijeron que debía repetirse el análisis de kev en dos días, es importante que se lo reece.  Si le hicieron kelle radiografía o kelle ecografía, un radiólogo las revisará. Le informarán de los nuevos hallazgos que puedan afectar giles atención médica.  Llame al 911  Llame al 911 si presenta cualquiera de estos síntomas:    Tiene dolor tania y sangrado muy abundante    Se siente muy aturdida o se desmaya    Ritmo cardíaco acelerado    Dificultades para respirar    Se siente confundida o tiene dificultades para despertarse   Cuándo debe buscar atención médica?  Llame a giles proveedor de atención médica de inmediato si tiene cualquiera de los siguientes síntomas:    El dolor en giles abdomen empeora, ya sea de manera repentina o gradual    Se marea o se siente débil al ponerse de pie    Tiene sangrado vaginal muy abundante. [place] significa que empapa kelle toallita sanitaria cada hora en yvonne horas    Ha tenido sangrado vaginal por más de liseth wily    Tiene vómito o diarrea que se repiten    El dolor en giles abdomen pasa a la parte inferior derecha    Tiene kev en el vómito o al evacuar los intestinos. Si eso sucede, serán de color helms oscuro o antoine    Tiene fiebre de 100.4  F (38  C) o más, o según le haya indicado giles proveedor de atención médica  Date Last Reviewed: 4/21/2014 2000-2017 [employer]. [address] [place] [address], [place] [address]. Todos los derechos reservados. Esta información no pretende sustituir la atención médica profesional. Sólo giles médico puede diagnosticar y tratar un problema de melissa.

## 2018-10-22 NOTE — ED PROVIDER NOTES
History     Chief Complaint:  Abdominal Pain    HPI   Katelyn Hollingsworth is a 23 year old female with a history of ovarian cyst who presents to the Emergency Department today for evaluation of abdominal pain. The patient reports that she has had intermittent lower abdominal pain for the past 5 days. The pain is so strong that she can not touch her stomach. She has lower back pain which comes simultaneously with her abdominal pain. She has been nauseous and has vomited 5 times today. Her vomit looked black. She reports constipation and that her last bowel movement was yesterday. She reports frequency of urination. Sometimes she feels as if water is coming out, like when someone is holding in urine but a little comes out but she does not know if this is coming from her urine or vagina. She reports a pinching sensation in her ovaries. Her vagina is itchy. No pain with urination. No diarrhea. No blood or vaginal discharge. No fever. Patient is 7 almost 8 weeks pregnant and reports that this pregnancy feels different from her previous. She fell on her side after the pain had started and is concerned the baby could have been hurt. She reports her OB has told her she has a cyst.     Allergies:  No known drug allergies    Medications:    Colace    Past Medical History:    Acid reflux  Ovarian cyst    Past Surgical History:    History reviewed. No pertinent surgical history.    Family History:    History reviewed. No pertinent family history.     Social History:  Smoking status: No  Alcohol use: No  Marital Status:   [2]     Review of Systems   Gastrointestinal: Positive for abdominal pain, constipation, nausea and vomiting. Negative for diarrhea.   Genitourinary: Positive for frequency. Negative for dysuria, vaginal bleeding and vaginal discharge.   Musculoskeletal: Positive for back pain.   All other systems reviewed and are negative.      Physical Exam     Patient Vitals for the past 24 hrs:   BP Temp Temp  src Pulse Heart Rate Resp SpO2 Weight   10/21/18 2100 141/85 98.5  F (36.9  C) Temporal 115 115 18 99 % 86.2 kg (190 lb)     Physical Exam     Constitutional: Alert, attentive, GCS 15  HENT:    Mouth/Throat: Oropharynx is clear, mucous membranes are moist   Eyes: EOM are normal, anicteric, conjugate gaze  CV: Tachycardic rate and rhythm; no murmurs  Chest: Effort normal and breath sounds clear without wheezing or rales, symmetric bilaterally   GI:  non tender. No distension. No guarding or rebound.  Pelvic: Thick and thin white discharge.  No CMT os closed  MSK: No LE edema, tenderness to palpation of BLE.  Neurological: Alert, attentive, moving all extremities equally.   Skin: Skin is warm and dry.    Emergency Department Course     Imaging:  Radiographic findings were communicated with the patient who voiced understanding of the findings.  US Abdomen Pelvis Duplex Limited  IMPRESSION:   1. Single live intrauterine gestation measuring 7 weeks 4 days.  2. Left ovarian 3.5 cm corpus luteal cyst. Normal Doppler blood flow  to both ovaries.   As read by radiology.  US OB <14 Weeks Single  MPRESSION:   1. Single live intrauterine gestation measuring 7 weeks 4 days.  2. Left ovarian 3.5 cm corpus luteal cyst. Normal Doppler blood flow  to both ovaries.   As read by radiology.    Laboratory:  BMP: WNL (Creatinine 0.53)  CBC: WBC 12.3 (H), o/w WNL (HGB 13.6, )    Wet prep: Negative  Neisseria gonorrhoeae: In process  Chlamydia trachomatis: In process    UA: Ketones, otherwise negative.     Interventions:  2153: Tylenol 975 MG PO  2153: Zofran 4 MG IV  2155: Dextrose 5% and 0.9% NaCl infusion 1000 mL/hr IV    Emergency Department Course:  Past medical records, nursing notes, and vitals reviewed.  2113: I performed an exam of the patient and obtained history, as documented above.    IV inserted and blood drawn.    2145: I spoke with ultrasound regarding this patient.    2153: I performed a chaperoned pelvic exam of  the patient.    The patient was sent for a ultrasound of her abdomen and pelvis and an OB ultrasound while in the emergency department, findings above.   I rechecked the patient. Explained findings to to her.    Impression & Plan      Medical Decision Makin-year-old female with no significant past medical history who is currently at approximately 2 months gestational age presenting for evaluation of bilateral lower abdominal pain with radiation to the back.  Vital signs notable for elevated heart rate 115 and elevated blood pressure 140/85 otherwise within normal limits.  On exam, patient has diffuse lower abdominal tenderness in the right and left groin without rebound or guarding suggestive against surgical abdomen.  She does have a ultrasound on file with highly likely early IUP at 5 weeks done 2 weeks prior suggestive against ectopic.  No report of bleeding or gush of fluid to suggest premature rupture or placental abruption.  She is Rh+ RhoGam deferred.  Patient was treated symptomatically with D5 NS fluids, Zofran, and thousand milligrams of Tylenol.  Pelvic exam was performed which shows scant white discharge in the vault, wet prep negative GC send though -2 weeks prior making PID or vaginitis unlikely.  Ultrasound was performed to assess both fetus and ovaries given concern for possible torsion, this demonstrated only a viable 7-week 4-day IUP with fetal cardiac activity and normal appearing ovaries with a left corpus luteal cyst.  Given no evidence of molar pregnancy, do not suspect early preeclampsia. her lab workup was notable for leukocytosis at 12.3 with a left shift however this is downtrending from more recent study with a white count of 15.  Do not suspect appendicitis at this time.  UA without evidence of UTI, does show mild ketones.  Patient had near full resolution of her discomfort with the above therapy.  Suspect this is likely pelvic pain secondary to pregnancy I did encourage her to  follow-up closely with her prenatal provider for recheck.  Will discharge home with a prescription for Tylenol and Zofran and encourage oral intake and hydration.  Discussed the safety profiles of these drugs in pregnancy.  Return precautions reviewed.  Entirety of exam and visit was conducted with use of a professional     Diagnosis:    ICD-10-CM    1. Pregnancy related bilateral lower abdominal pain, antepartum O99.89     R10.30        Disposition:  discharged to home    Discharge Medications:  New Prescriptions    ACETAMINOPHEN (TYLENOL) 500 MG TABLET    Take 2 tablets (1,000 mg) by mouth every 6 hours as needed for pain    ONDANSETRON (ZOFRAN ODT) 4 MG ODT TAB    Take 1 tablet (4 mg) by mouth every 8 hours as needed for nausea     Jennifer Garzon  10/21/2018   [facility] EMERGENCY DEPARTMENT  Scribe Disclosure:  I, Jennifer Garzon, am serving as a scribe at 9:13 PM on 10/21/2018 to document services personally performed by Matthew Lucas MD based on my observations and the provider's statements to me.        Matthew Lucas MD  10/22/18 0003

## 2018-10-22 NOTE — ED NOTES
Pt discharge instructions reviewed using ipad . Pt verbalizes understating of discharge plan and need to follow up with OBGYN

## 2018-11-29 ENCOUNTER — PRENATAL OFFICE VISIT (OUTPATIENT)
Dept: NURSING | Facility: CLINIC | Age: 23
End: 2018-11-29
Payer: MEDICAID

## 2018-11-29 ENCOUNTER — TELEPHONE (OUTPATIENT)
Dept: OBGYN | Facility: CLINIC | Age: 23
End: 2018-11-29

## 2018-11-29 VITALS
HEIGHT: 62 IN | SYSTOLIC BLOOD PRESSURE: 94 MMHG | BODY MASS INDEX: 33.51 KG/M2 | WEIGHT: 182.1 LBS | DIASTOLIC BLOOD PRESSURE: 54 MMHG

## 2018-11-29 DIAGNOSIS — Z34.90 SUPERVISION OF NORMAL PREGNANCY: Primary | ICD-10-CM

## 2018-11-29 DIAGNOSIS — O21.9 NAUSEA AND VOMITING IN PREGNANCY: Primary | ICD-10-CM

## 2018-11-29 LAB
ABO + RH BLD: NORMAL
ABO + RH BLD: NORMAL
BLD GP AB SCN SERPL QL: NORMAL
BLOOD BANK CMNT PATIENT-IMP: NORMAL
SPECIMEN EXP DATE BLD: NORMAL

## 2018-11-29 PROCEDURE — 99207 ZZC NO CHARGE NURSE ONLY: CPT

## 2018-11-29 PROCEDURE — 86780 TREPONEMA PALLIDUM: CPT | Performed by: ADVANCED PRACTICE MIDWIFE

## 2018-11-29 PROCEDURE — 86900 BLOOD TYPING SEROLOGIC ABO: CPT | Performed by: ADVANCED PRACTICE MIDWIFE

## 2018-11-29 PROCEDURE — 87340 HEPATITIS B SURFACE AG IA: CPT | Performed by: ADVANCED PRACTICE MIDWIFE

## 2018-11-29 PROCEDURE — 87086 URINE CULTURE/COLONY COUNT: CPT | Performed by: ADVANCED PRACTICE MIDWIFE

## 2018-11-29 PROCEDURE — 36415 COLL VENOUS BLD VENIPUNCTURE: CPT | Performed by: ADVANCED PRACTICE MIDWIFE

## 2018-11-29 PROCEDURE — T1013 SIGN LANG/ORAL INTERPRETER: HCPCS | Mod: U3

## 2018-11-29 PROCEDURE — 86850 RBC ANTIBODY SCREEN: CPT | Performed by: ADVANCED PRACTICE MIDWIFE

## 2018-11-29 PROCEDURE — 86762 RUBELLA ANTIBODY: CPT | Performed by: ADVANCED PRACTICE MIDWIFE

## 2018-11-29 PROCEDURE — 87389 HIV-1 AG W/HIV-1&-2 AB AG IA: CPT | Performed by: ADVANCED PRACTICE MIDWIFE

## 2018-11-29 PROCEDURE — 86901 BLOOD TYPING SEROLOGIC RH(D): CPT | Performed by: ADVANCED PRACTICE MIDWIFE

## 2018-11-29 RX ORDER — ONDANSETRON 4 MG/1
4 TABLET, ORALLY DISINTEGRATING ORAL EVERY 8 HOURS PRN
Qty: 10 TABLET | Refills: 0 | Status: SHIPPED | OUTPATIENT
Start: 2018-11-29 | End: 2019-05-01

## 2018-11-29 RX ORDER — PRENATAL VIT/IRON FUM/FOLIC AC 27MG-0.8MG
1 TABLET ORAL DAILY
Qty: 100 TABLET | Refills: 3 | Status: ON HOLD | COMMUNITY
Start: 2018-11-29 | End: 2023-01-27

## 2018-11-29 NOTE — TELEPHONE ENCOUNTER
Pt here for NPN nurse visit. States that she recently ran out of the zofran that she had been rx'd for her nausea and vomiting.   Pt is requesting a refill.     Pt does not speak english. Pt will check with the pharmacy later on today or tomorrow.     Margarita MARTINEZ RN

## 2018-11-29 NOTE — MR AVS SNAPSHOT
"              After Visit Summary   11/29/2018    Katelyn Hollingsworth    MRN: [id number]           Patient Information     Date Of Birth          1995        Visit Information        Provider Department      11/29/2018 2:00 PM Lois Miguel; [facility] PRENATAL NURSE  Services Department        Today's Diagnoses     Supervision of normal pregnancy    -  1       Follow-ups after your visit        Your next 10 appointments already scheduled     Dec 04, 2018  1:45 PM CST   New Prenatal with SHANEL Baig CNM   [facility]    [address]  [address]  [facility] [id number] [phone]              Who to contact     If you have questions or need follow up information about today's clinic visit or your schedule please contact [facility] directly at [phone].  Normal or non-critical lab and imaging results will be communicated to you by MyChart, letter or phone within 4 business days after the clinic has received the results. If you do not hear from us within 7 days, please contact the clinic through MyChart or phone. If you have a critical or abnormal lab result, we will notify you by phone as soon as possible.  Submit refill requests through [employer] or call your pharmacy and they will forward the refill request to us. Please allow 3 business days for your refill to be completed.          Additional Information About Your Visit        MyChart Information     [employer] lets you send messages to your doctor, view your test results, renew your prescriptions, schedule appointments and more. To sign up, go to [email or web address] . Click on \"Log in\" on the left side of the screen, which will take you to the Welcome page. Then click on \"Sign up Now\" on the right side of the page.     You will be asked to enter the access code listed below, as well as some personal information. Please follow the directions " "to create your username and password.     Your access code is: [email or web address]  Expires: 2018  4:59 PM     Your access code will  in 90 days. If you need help or a new code, please call your [place] clinic or [phone].        Care EveryWhere ID     This is your Care EveryWhere ID. This could be used by other organizations to access your [place] medical records  [id number]        Your Vitals Were     Height Last Period BMI (Body Mass Index)             5' 2\" (1.575 m) 2018 (Exact Date) 33.31 kg/m2          Blood Pressure from Last 3 Encounters:   18 94/54   10/22/18 105/62   10/06/18 125/70    Weight from Last 3 Encounters:   18 182 lb 1.6 oz (82.6 kg)   10/21/18 190 lb (86.2 kg)   18 190 lb (86.2 kg)              We Performed the Following     ABO/Rh type and screen     Hepatitis B surface antigen     HIV Antigen Antibody Combo     Rubella Antibody IgG Quantitative     Treponema Abs w Reflex to RPR and Titer     Urine Culture Aerobic Bacterial        Primary Care Provider Fax #    Physician No Ref-Primary [phone]       No address on file        Equal Access to Services     BART ZUNIGA : Hadii aad ku hadasho Soomaali, waaxda luqadaha, qaybta kaalmada adeegyada, waxay idiin hayivannan ivis khjosé miguel laaldo . So [facility] [phone].    ATENCIÓN: Si habla español, tiene a giles disposición servicios gratuitos de asistencia lingüística. Llcaitlin al [phone].    We comply with applicable federal civil rights laws and [place] laws. We do not discriminate on the basis of race, color, national origin, age, disability, sex, sexual orientation, or gender identity.            Thank you!     Thank you for choosing [facility]  for your care. Our goal is always to provide you with excellent care. Hearing back from our patients is one way we can continue to improve our services. Please take a few minutes to complete the written survey that you may receive in the mail after " your visit with us. Thank you!             Your Updated Medication List - Protect others around you: Learn how to safely use, store and throw away your medicines at www.disposemymeds.org.          This list is accurate as of 11/29/18  3:00 PM.  Always use your most recent med list.                   Brand Name Dispense Instructions for use Diagnosis    prenatal multivitamin w/iron 27-0.8 MG tablet     100 tablet    Take 1 tablet by mouth daily

## 2018-11-29 NOTE — NURSING NOTE
NPN nurse visit. 1st dr visit scheduled for 12/4/18 with Diana Madison CNM  Pap due. Last pap unsure.  13w5d    CHRISS Arce RN

## 2018-11-30 LAB
BACTERIA SPEC CULT: NORMAL
HBV SURFACE AG SERPL QL IA: NONREACTIVE
HIV 1+2 AB+HIV1 P24 AG SERPL QL IA: NONREACTIVE
RUBV IGG SERPL IA-ACNC: 9 IU/ML
SPECIMEN SOURCE: NORMAL
T PALLIDUM AB SER QL: NONREACTIVE

## 2018-12-04 ENCOUNTER — PRENATAL OFFICE VISIT (OUTPATIENT)
Dept: OBGYN | Facility: CLINIC | Age: 23
End: 2018-12-04
Payer: MEDICAID

## 2018-12-04 VITALS — WEIGHT: 183.5 LBS | BODY MASS INDEX: 33.56 KG/M2 | DIASTOLIC BLOOD PRESSURE: 68 MMHG | SYSTOLIC BLOOD PRESSURE: 104 MMHG

## 2018-12-04 DIAGNOSIS — Z23 NEED FOR PROPHYLACTIC VACCINATION AND INOCULATION AGAINST INFLUENZA: ICD-10-CM

## 2018-12-04 DIAGNOSIS — N39.0 URINARY TRACT INFECTION: ICD-10-CM

## 2018-12-04 DIAGNOSIS — Z34.82 ENCOUNTER FOR SUPERVISION OF OTHER NORMAL PREGNANCY IN SECOND TRIMESTER: Primary | ICD-10-CM

## 2018-12-04 PROBLEM — Z34.90 SUPERVISION OF NORMAL PREGNANCY: Status: ACTIVE | Noted: 2018-12-04

## 2018-12-04 LAB
ALBUMIN UR-MCNC: NEGATIVE MG/DL
APPEARANCE UR: CLEAR
BILIRUB UR QL STRIP: NEGATIVE
COLOR UR AUTO: YELLOW
GLUCOSE UR STRIP-MCNC: NEGATIVE MG/DL
HGB UR QL STRIP: NEGATIVE
KETONES UR STRIP-MCNC: ABNORMAL MG/DL
LEUKOCYTE ESTERASE UR QL STRIP: NEGATIVE
NITRATE UR QL: NEGATIVE
PH UR STRIP: 5 PH (ref 5–7)
SOURCE: ABNORMAL
SP GR UR STRIP: >1.03 (ref 1–1.03)
UROBILINOGEN UR STRIP-ACNC: 0.2 EU/DL (ref 0.2–1)

## 2018-12-04 PROCEDURE — 36415 COLL VENOUS BLD VENIPUNCTURE: CPT | Performed by: ADVANCED PRACTICE MIDWIFE

## 2018-12-04 PROCEDURE — 99000 SPECIMEN HANDLING OFFICE-LAB: CPT | Performed by: ADVANCED PRACTICE MIDWIFE

## 2018-12-04 PROCEDURE — 99203 OFFICE O/P NEW LOW 30 MIN: CPT | Mod: 25 | Performed by: ADVANCED PRACTICE MIDWIFE

## 2018-12-04 PROCEDURE — 81003 URINALYSIS AUTO W/O SCOPE: CPT | Performed by: ADVANCED PRACTICE MIDWIFE

## 2018-12-04 PROCEDURE — 40000791 ZZHCL STATISTIC VERIFI PRENATAL TRISOMY 21,18,13: Mod: 90 | Performed by: ADVANCED PRACTICE MIDWIFE

## 2018-12-04 PROCEDURE — 87591 N.GONORRHOEAE DNA AMP PROB: CPT | Performed by: ADVANCED PRACTICE MIDWIFE

## 2018-12-04 PROCEDURE — T1013 SIGN LANG/ORAL INTERPRETER: HCPCS | Mod: U3 | Performed by: ADVANCED PRACTICE MIDWIFE

## 2018-12-04 PROCEDURE — 90686 IIV4 VACC NO PRSV 0.5 ML IM: CPT | Performed by: ADVANCED PRACTICE MIDWIFE

## 2018-12-04 PROCEDURE — 87491 CHLMYD TRACH DNA AMP PROBE: CPT | Performed by: ADVANCED PRACTICE MIDWIFE

## 2018-12-04 PROCEDURE — G0145 SCR C/V CYTO,THINLAYER,RESCR: HCPCS | Performed by: ADVANCED PRACTICE MIDWIFE

## 2018-12-04 PROCEDURE — 90471 IMMUNIZATION ADMIN: CPT | Performed by: ADVANCED PRACTICE MIDWIFE

## 2018-12-04 NOTE — PROGRESS NOTES
Injectable Influenza Immunization Documentation    1.  Is the person to be vaccinated sick today?   No    2. Does the person to be vaccinated have an allergy to a component   of the vaccine?   No  Egg Allergy Algorithm Link    3. Has the person to be vaccinated ever had a serious reaction   to influenza vaccine in the past?   No    4. Has the person to be vaccinated ever had Guillain-Barré syndrome?   No    Form completed by Devorah Dye CMA on 12/4/2018 at 2:37 PM

## 2018-12-04 NOTE — MR AVS SNAPSHOT
After Visit Summary   12/4/2018    Katelyn Hollingsworth    MRN: [id number]           Patient Information     Date Of Birth          1995        Visit Information        Provider Department      12/4/2018 1:45 PM Diana Madison APRN CNM; [place] [employer] [facility]        Today's Diagnoses     Encounter for supervision of other normal pregnancy in second trimester    -  1    Need for prophylactic vaccination and inoculation against influenza           Follow-ups after your visit        Follow-up notes from your care team     Return in about 4 weeks (around 1/1/2019) for Prenatal Visit.      Who to contact     If you have questions or need follow up information about today's clinic visit or your schedule please contact [facility] directly at [phone].  Normal or non-critical lab and imaging results will be communicated to you by MyChart, letter or phone within 4 business days after the clinic has received the results. If you do not hear from us within 7 days, please contact the clinic through MyChart or phone. If you have a critical or abnormal lab result, we will notify you by phone as soon as possible.  Submit refill requests through I AM AT or call your pharmacy and they will forward the refill request to us. Please allow 3 business days for your refill to be completed.          Additional Information About Your Visit        Care EveryWhere ID     This is your Care EveryWhere ID. This could be used by other organizations to access your [place] medical records  [id number]        Your Vitals Were     Last Period Breastfeeding? BMI (Body Mass Index)             08/25/2018 (Exact Date) No 33.56 kg/m2          Blood Pressure from Last 3 Encounters:   12/04/18 104/68   11/29/18 94/54   10/22/18 105/62    Weight from Last 3 Encounters:   12/04/18 183 lb 8 oz (83.2 kg)   11/29/18 182 lb 1.6 oz (82.6 kg)   10/21/18 190 lb (86.2 kg)               We Performed the Following     CHLAMYDIA TRACHOMATIS PCR     FLU VACCINE, SPLIT VIRUS, IM (QUADRIVALENT) [12705]- >3 YRS     NEISSERIA GONORRHOEA PCR     PAP imaged thin layer screen     Vaccine Administration, Initial [[id number]]        Primary Care Provider Fax #    Physician No Ref-Primary [phone]       No address on file        Equal Access to Services     JD BRITODIOR : Hadii aad ku hadasho Soomaali, waaxda luqadaha, qaybta kaalmada adetrungtomer, waxay idiin hayivannamoisés herrstarrjas [place] . So [facility] [phone].    ATENCIÓN: Si habla español, tiene a giles disposición servicios gratuitos de asistencia lingüística. [facility] [phone].    We comply with applicable federal civil rights laws and [place] laws. We do not discriminate on the basis of race, color, national origin, age, disability, sex, sexual orientation, or gender identity.            Thank you!     Thank you for choosing [facility]  for your care. Our goal is always to provide you with excellent care. Hearing back from our patients is one way we can continue to improve our services. Please take a few minutes to complete the written survey that you may receive in the mail after your visit with us. Thank you!             Your Updated Medication List - Protect others around you: Learn how to safely use, store and throw away your medicines at www.disposemymeds.org.          This list is accurate as of 12/4/18  3:09 PM.  Always use your most recent med list.                   Brand Name Dispense Instructions for use Diagnosis    ondansetron 4 MG ODT tab    ZOFRAN ODT    10 tablet    Take 1 tablet (4 mg) by mouth every 8 hours as needed for nausea    Nausea and vomiting in pregnancy       prenatal multivitamin w/iron 27-0.8 MG tablet     100 tablet    Take 1 tablet by mouth daily

## 2018-12-04 NOTE — LETTER
[facility]  [address], [address]  [place] [address]  [phone]        December 5, 2018    Katelyn Hollingsworth  [id number]  [facility] [address]            Dear Ms. Katelyn Baltazar Darrick:      The results of your recent labs were NORMAL.   Results for orders placed or performed in visit on 12/04/18   UA reflex to Microscopic and Culture   Result Value Ref Range    Color Urine Yellow     Appearance Urine Clear     Glucose Urine Negative NEG^Negative mg/dL    Bilirubin Urine Negative NEG^Negative    Ketones Urine Trace (A) NEG^Negative mg/dL    Specific Gravity Urine >1.030 1.003 - 1.035    Blood Urine Negative NEG^Negative    pH Urine 5.0 5.0 - 7.0 pH    Protein Albumin Urine Negative NEG^Negative mg/dL    Urobilinogen Urine 0.2 0.2 - 1.0 EU/dL    Nitrite Urine Negative NEG^Negative    Leukocyte Esterase Urine Negative NEG^Negative    Source Midstream Urine    NEISSERIA GONORRHOEA PCR   Result Value Ref Range    Specimen Descrip Vagina     N Gonorrhea PCR Negative NEG^Negative   CHLAMYDIA TRACHOMATIS PCR   Result Value Ref Range    Specimen Description Vagina     Chlamydia Trachomatis PCR Negative NEG^Negative      If you have any further questions or problems, please contact our office.    Sincerely,    Diana Madison CNM

## 2018-12-04 NOTE — LETTER
December 13, 2018      Katelyn Hollingsworth  [address]  [facility] [address]    Dear Ms.Valentin Millardrge,      I am happy to inform you that your recent cervical cancer screening test (PAP smear) was normal.      Preventative screenings such as this help to ensure your health for years to come. You should repeat a pap smear in 3 years, unless otherwise directed.      You will still need to return to the clinic every year for your annual exam and other preventive tests.     If you have additional questions regarding this result, please call our registered nurse, Esther at [phone].      Sincerely,      SHANEL Matute CNM/[facility]

## 2018-12-04 NOTE — PROGRESS NOTES
Katelyn Hollingsworth is a 23 year old single ,  who is not a previous CNM patient. She presents for a new OB Visit. This was not a planned pregnancy.     FOB is in good health.  FOB IS actively involved in relationship and this pregnancy.    Katelyn presents for her first care this pregnancy. She desires midwifery care. Labs and U/S WNL. She has had two uncomplicated pregnancies and vaginal deliveries. She has a history of migraines that are relieved by Tylenol. Patient reports UTI symptoms.   She has not had bleeding since her LMP.    She denies abdominal pain since her LMP.  She has not had nausea.  has not had vomiting.  Any personal or family history of blood clots? No  History of sickle cell anemia or trait? No         Patient's last menstrual period was 2018 (exact date)..  Estimated Date of Delivery: 2019 Ultrasound consistent with LMP.    MENSTRUAL HISTORY    frequency: every 28 days  Last PAP:    History of abnormal Pap?  No    Health maintenance updated:  yes        Current medications are:    Current Outpatient Prescriptions:      ondansetron (ZOFRAN ODT) 4 MG ODT tab, Take 1 tablet (4 mg) by mouth every 8 hours as needed for nausea, Disp: 10 tablet, Rfl: 0     Prenatal Vit-Fe Fumarate-FA (PRENATAL MULTIVITAMIN W/IRON) 27-0.8 MG tablet, Take 1 tablet by mouth daily, Disp: 100 tablet, Rfl: 3     INFECTION HISTORY  HIV: No  Hepatitis B: No  Hepatitis C: No  Tuberculosis: No    Genital Herpes self: no  Herpes partner:  no  Chlamydia:  no  Gonorrhea:  no  HPV: No  BV:  No  Syphilis:  No  Chicken Pox:  No      OB HISTORY  Obstetric History       T2      L2     SAB2   TAB0   Ectopic0   Multiple0   Live Births2       # Outcome Date GA Lbr James/2nd Weight Sex Delivery Anes PTL Lv   5 Current            4 2016 5w0d          3 2016 5w0d          2 Term 12/17/15 40w0d  8 lb 7 oz (3.827 kg) M  EPI N SAMUEL      Name: Seamus   1 Term 10/25/14 40w0d  8 lb 3 oz  (3.714 kg) F  EPI N SAMUEL      Name: Gurwinder          History of GDM: No,  PTL : No,  History of HTN in pregnancy: No,  Thrombocytopenia: No,  Shoulder dystocia: No,  Vacuum Extraction: No  PPH: No   3rd of 4th degree laceration: No.   Other complications: No    PERSONAL HISTORY  Exercise Habits:  walking irregularly days per week.  Employment: Part time.  Her job involves light activity with no potential for toxic exposure.    Travel plans:  are none planned.   Diet: eats regular meals and takes daily vitamins  Abuse concerns? No  Hgb A1c screen:  BMI > 30: No, 1st degree family DM: No, History of GDM: No, PCOS: No, High risk ethnicity: No    Social History     Social History     Marital status:      Spouse name: Sabianism     Number of children: 2     Years of education: N/A     Occupational History     housekeeping      Social History Main Topics     Smoking status: Never Smoker     Smokeless tobacco: Never Used     Alcohol use No     Drug use: No     Sexual activity: Yes     Other Topics Concern     Not on file     Social History Narrative         She  reports that she has never smoked. She has never used smokeless tobacco.    STD testing offered?  Accepted  Last PHQ-9 score on record = No flowsheet data found.  Last GAD7 score on record = No flowsheet data found.  Alcohol Score = 0  Referral/Meds needed? no    PAST MEDICAL/SURGICAL HISTORY  History reviewed. No pertinent past medical history.  History reviewed. No pertinent surgical history.    FAMILY HISTORY  History reviewed. No pertinent family history.      ROS:  12 point review of systems negative other than symptoms noted below.      PHYSICAL EXAM  Vitals: /68 (BP Location: Left arm, Patient Position: Chair, Cuff Size: Adult Large)  Wt 183 lb 8 oz (83.2 kg)  LMP 2018 (Exact Date)  Breastfeeding? No  BMI 33.56 kg/m2  BMI= Body mass index is 33.56 kg/(m^2).     GENERAL:  23 year old pleasant pregnant female, alert, cooperative and  well groomed.  NECK:  Thyroid without enlargement and nodules.  Lymph nodes not palpable.   LUNGS:  Clear to auscultation.  BREAST:  Symmetrical without lesions or nodes.  Nipples everted.  Areolas symmetric.  No palpable axillary nodes.  HEART:  RRR without murmur.  ABDOMEN: Soft without masses or tenderness.  No scars noted..  GENITALIA: BUS without tenderness or inflammation.  Perineum without lesions.    VAGINA:  Pink, normal rugae and discharge.  CERVIX:  Posterior, smooth, without discharge, and firm/ closed   UTERUS: Anteverted, nontender 14 weeks in size.  ADNEXA: Without masses or tenderness  RECTAL:  Normal appearance.  Digital exam deferred.  LOWER EXTREMITIES: No edema. No significant varicosities.    ASSESSMENT/PLAN:    IUP at 14w3d    ICD-10-CM    1. Encounter for supervision of other normal pregnancy in second trimester Z34.82 PAP imaged thin layer screen     NEISSERIA GONORRHOEA PCR     CHLAMYDIA TRACHOMATIS PCR   2. Need for prophylactic vaccination and inoculation against influenza Z23 FLU VACCINE, SPLIT VIRUS, IM (QUADRIVALENT) [35320]- >3 YRS     Vaccine Administration, Initial [07700]        consult for US for AMA patients: NA  Genetic Testing reviewed and discussed, patient desires [facility].     COUNSELING    Instructed on use of triage nurse line and contacting the on call CNM after hours in an emergency.     Symptoms of N&V and fatigue usually start to resolve around 12-16 weeks     Reviewed CNM philosophy, call schedule for labor and delivery, and FSH for delivery    1st OB handout given outlining appointment spacing and CNM information    Reviewed exercise and nutrition    Recommend to gain 20 pounds with her pregnancy.    Discussed OTC medications. OB med list given    Encouraged patient to arrange  if needed    Encouraged patient to take PNV's/DHA    Travel precautions discussed, no air travel after 36 weeks and Zika Virus discussed    Will call patient with lab results  when available    Does patient desire a RN home visit from the [facility]?  No    If yes, paperwork completed?  No     F/U to be addressed next visit: Rx's given, referrals    Will return to the clinic in 4 weeks for her next routine prenatal check.  Will call to be seen sooner if problems arise.    Oriented to midwifery care. Pap and cultures collected. Urine culture due to UTI symptoms. [facility] today.     Diana Madison CNM

## 2018-12-04 NOTE — NURSING NOTE
"Chief Complaint   Patient presents with     Prenatal Care     NPN GC and Pap due       Initial /68 (BP Location: Left arm, Patient Position: Chair, Cuff Size: Adult Large)  Wt 183 lb 8 oz (83.2 kg)  LMP 2018 (Exact Date)  Breastfeeding? No  BMI 33.56 kg/m2 Estimated body mass index is 33.56 kg/(m^2) as calculated from the following:    Height as of 18: 5' 2\" (1.575 m).    Weight as of this encounter: 183 lb 8 oz (83.2 kg).  BP completed using cuff size: regular    Questioned patient about current smoking habits.  Pt. has never smoked.          14w3d      The following HM Due: pap smear  Chalmydia ()  Vaccinations: Flu shot today      Devorah Dye, ISSAC on 2018 at 2:23 PM           "

## 2018-12-06 LAB
COPATH REPORT: NORMAL
PAP: NORMAL

## 2018-12-11 ENCOUNTER — TELEPHONE (OUTPATIENT)
Dept: OBGYN | Facility: CLINIC | Age: 23
End: 2018-12-11

## 2018-12-11 NOTE — TELEPHONE ENCOUNTER
Results received from [facility] testing in [facility].  Testing done:  Innatal Prenatal Screen    Action:  LM for pt to cb to report NORMAL results.    Gender: **GIRL**  Will ask pt if they wish to know the gender.    Please route to provider as FYI once pt is informed. (MM)    Margarita MARTINEZ, RN

## 2018-12-13 NOTE — TELEPHONE ENCOUNTER
Called pt with  and discussed results. Gender revealed. Questions answered.        Tenisha Escobedo RN

## 2018-12-30 ENCOUNTER — APPOINTMENT (OUTPATIENT)
Dept: ULTRASOUND IMAGING | Facility: CLINIC | Age: 23
End: 2018-12-30
Attending: EMERGENCY MEDICINE
Payer: MEDICAID

## 2018-12-30 ENCOUNTER — HOSPITAL ENCOUNTER (EMERGENCY)
Facility: CLINIC | Age: 23
Discharge: HOME | End: 2018-12-30
Attending: EMERGENCY MEDICINE | Admitting: EMERGENCY MEDICINE
Payer: MEDICAID

## 2018-12-30 VITALS
RESPIRATION RATE: 16 BRPM | TEMPERATURE: 98.7 F | OXYGEN SATURATION: 99 % | HEART RATE: 100 BPM | SYSTOLIC BLOOD PRESSURE: 102 MMHG | DIASTOLIC BLOOD PRESSURE: 72 MMHG

## 2018-12-30 DIAGNOSIS — S39.91XA BLUNT ABDOMINAL TRAUMA, INITIAL ENCOUNTER: ICD-10-CM

## 2018-12-30 DIAGNOSIS — Z3A.18 18 WEEKS GESTATION OF PREGNANCY: ICD-10-CM
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
## 2018-12-30 PROCEDURE — 25000132 ZZH RX MED GY IP 250 OP 250 PS 637: Performed by: EMERGENCY MEDICINE

## 2018-12-30 PROCEDURE — 76815 OB US LIMITED FETUS(S): CPT

## 2018-12-30 PROCEDURE — 76805 OB US >/= 14 WKS SNGL FETUS: CPT

## 2018-12-30 PROCEDURE — 99284 EMERGENCY DEPT VISIT MOD MDM: CPT | Mod: 25

## 2018-12-30 RX ORDER — ACETAMINOPHEN 500 MG
1000 TABLET ORAL ONCE
Status: COMPLETED | OUTPATIENT
Start: 2018-12-30 | End: 2018-12-30

## 2018-12-30 RX ADMIN — ACETAMINOPHEN 1000 MG: 500 TABLET, FILM COATED ORAL at 05:21

## 2018-12-30 NOTE — ED AVS SNAPSHOT
[facility] Emergency Department  [address]  [facility] [address]  Phone:  [phone]  Fax:  [phone]                                    Katelyn Hollingsworth   MRN: [id number]    Department:  [facility] Emergency Department   Date of Visit:  12/30/2018           After Visit Summary Signature Page    I have received my discharge instructions, and my questions have been answered. I have discussed any challenges I see with this plan with the nurse or doctor.    ..........................................................................................................................................  Patient/Patient Representative Signature      ..........................................................................................................................................  Patient Representative Print Name and Relationship to Patient    ..................................................               ................................................  Date                                   Time    ..........................................................................................................................................  Reviewed by Signature/Title    ...................................................              ..............................................  Date                                               Time          22EPIC Rev 08/18

## 2018-12-30 NOTE — ED PROVIDER NOTES
History     Chief Complaint:  Fall    The history is provided by the patient. A  was used.      Katelyn Hollingsworth is a  18-weeks pregnant 23 year old female who presents for evaluation of after a mechanical fall sustained just prior to presentation. The patient reports that she got off work at 3:00 AM and when she got home at 4:00 PM she slipped on the ice while walking into her house, falling forward onto her stomach. The patient states that she has not been bleeding from her vagina since this fall but that she possibly has had some fluid discharge. On presentation, the patient states that she is feeling some cramping in her lower abdomen and that this is worrying her. Patient states that this is her 3rd pregnancy. Patient denies other complaint.      Allergies:  No known drug allergies     Medications:    Prenatal multivitamin      Past Medical History:    The patient does not have any past pertinent medical history.     Past Surgical History:    History reviewed. No pertinent surgical history.     Family History:    History reviewed. No pertinent family history.      Social History:  Presents alone   Tobacco use: Never smoker   Alcohol use: No  PCP: [facility]    Marital Status:       Review of Systems   Genitourinary: Positive for vaginal discharge. Negative for vaginal bleeding.   All other systems reviewed and are negative.    Physical Exam     Patient Vitals for the past 24 hrs:   BP Temp Temp src Pulse Resp SpO2   18 0600 103/54 -- -- 95 18 99 %   18 0424 111/66 98.7  F (37.1  C) Temporal 98 20 100 %        Physical Exam  Nursing note and vitals reviewed.  Constitutional: Cooperative.   HENT:   Mouth/Throat: Mucous membranes are normal. Freely moving neck.    Cardiovascular: Normal rate, regular rhythm and normal heart sounds.  No murmur.  Pulmonary/Chest: Effort normal and breath sounds normal. No respiratory distress. No wheezes. No rales.    Abdominal: Soft. Gravid c/w stated dates.  Normal appearance and bowel sounds are normal. No distension. There is no tenderness. There is no rigidity and no guarding.   Neurological: Alert. Oriented x4.  GCS 15.   Skin: Skin is warm and dry.    Psychiatric: Normal mood and affect.      Emergency Department Course     Imaging:  Radiographic findings were communicated with the patient who voiced understanding of the findings.    US OB >14 Weeks with transvaginal:  IMPRESSION:  1. Single live intrauterine pregnancy.  2. Normal amniotic fluid volume    Imaging independently reviewed and agree with radiologist interpretation.      Interventions:  0521: Tylenol 1000 mg PO      Emergency Department Course:  Past medical records, nursing notes, and vitals reviewed.  0428: I performed an exam of the patient and obtained history, as documented above.    Above interventions provided.  The patient was sent for an US while in the emergency department, findings above.     0630: I rechecked the patient. Findings and plan explained to the Patient. Patient discharged home with instructions regarding supportive care, medications, and reasons to return. The importance of close follow-up was reviewed.        Impression & Plan      Medical Decision Making:  Katelyn Hollingsworth is a  23 year old female who presents after a fall onto her abdomen. She is at approximately 18 weeks gestation. Bedside ultrasound shows normal cardiac activity and fetal movement. Given her possible description of loss of fluids, I did have a forma ultrasound performed which shows normal amniotic fluid volume. There has been no vaginal bleeding. Plan of care will be continued support and watchful waiting. Return precautions discussed and routine obstetrics follow up recommended.    Diagnosis:    ICD-10-CM    1. Fall, initial encounter W19.XXXA    2. Blunt abdominal trauma, initial encounter S39.81XA    3. 18 weeks gestation of pregnancy Z3A.18         Disposition:  Discharged to home with plan as outlined.    Discharge Medications:     Review of your medicines      UNREVIEWED medicines. Ask your doctor about these medicines      Dose / Directions   prenatal multivitamin w/iron 27-0.8 MG tablet      Dose:  1 tablet  Take 1 tablet by mouth daily  Quantity:  100 tablet  Refills:  3             Scribe Disclosure:  Boom CARTER, am serving as a scribe at 5:02 AM on 12/30/2018 to document services personally performed by Matthew Fernandes MD based on my observations and the provider's statements to me.  12/30/2018   EMERGENCY DEPARTMENT      Matthew Fernandes MD  12/30/18 0639

## 2019-01-02 ENCOUNTER — PRENATAL OFFICE VISIT (OUTPATIENT)
Dept: OBGYN | Facility: CLINIC | Age: 24
End: 2019-01-02
Payer: COMMERCIAL

## 2019-01-02 VITALS — WEIGHT: 183 LBS | DIASTOLIC BLOOD PRESSURE: 58 MMHG | BODY MASS INDEX: 33.47 KG/M2 | SYSTOLIC BLOOD PRESSURE: 104 MMHG

## 2019-01-02 DIAGNOSIS — R10.9 CRAMPING AFFECTING PREGNANCY, ANTEPARTUM: ICD-10-CM

## 2019-01-02 DIAGNOSIS — Z34.82 ENCOUNTER FOR SUPERVISION OF OTHER NORMAL PREGNANCY IN SECOND TRIMESTER: Primary | ICD-10-CM

## 2019-01-02 DIAGNOSIS — O26.899 CRAMPING AFFECTING PREGNANCY, ANTEPARTUM: ICD-10-CM

## 2019-01-02 LAB
ALBUMIN UR-MCNC: NEGATIVE MG/DL
APPEARANCE UR: CLEAR
BILIRUB UR QL STRIP: NEGATIVE
COLOR UR AUTO: YELLOW
GLUCOSE UR STRIP-MCNC: NEGATIVE MG/DL
HGB UR QL STRIP: NEGATIVE
KETONES UR STRIP-MCNC: 15 MG/DL
LEUKOCYTE ESTERASE UR QL STRIP: NEGATIVE
NITRATE UR QL: NEGATIVE
PH UR STRIP: 7 PH (ref 5–7)
SOURCE: ABNORMAL
SP GR UR STRIP: 1.02 (ref 1–1.03)
UROBILINOGEN UR STRIP-ACNC: 1 EU/DL (ref 0.2–1)

## 2019-01-02 PROCEDURE — 99207 ZZC PRENATAL VISIT: CPT | Performed by: ADVANCED PRACTICE MIDWIFE

## 2019-01-02 PROCEDURE — 81003 URINALYSIS AUTO W/O SCOPE: CPT | Performed by: ADVANCED PRACTICE MIDWIFE

## 2019-01-02 NOTE — PROGRESS NOTES
S: Feels okay, was seen in ER 12/30 after a fall onto abdomen. US in ER NIL, no lab work was drawn. Patient has had no bleeding, states she  Has started feeling fetal movement. Occasional cramping since falling.  Denies vaginal bleeding or leaking of fluid.  O: Vitals: LMP 08/25/2018 (Exact Date)  /58 (BP Location: Right arm, Cuff Size: Adult Regular)   Wt 83 kg (183 lb)   LMP 08/25/2018 (Exact Date)   BMI 33.47 kg/m      BMI= There is no height or weight on file to calculate BMI.  Exam:  Constitutional: healthy, alert and no distress  Respiratory: respirations even and unlabored  Gastrointestinal: Abdomen soft, non-tender. Fundus measures appropriate for gestational age. Fetal heart tones hear without difficulty and within normal limits  : Deferred  Psychiatric: mentation appears normal and affect normal/bright  A: (Z34.82) Encounter for supervision of other normal pregnancy in second trimester  (primary encounter diagnosis)  Comment: wnl  Plan: US OB > 14 Weeks        -ordered fetal survey     (O26.899,  R10.9) Cramping affecting pregnancy, antepartum  Comment: ordered   Plan: *UA reflex to Microscopic        Reviewed PTL warning signs, evaluate for UTI.   -encouraged to increase fluid intake.       P: Discussed 20 week fetal screen.   Encouraged patient to call with any questions or concerns.  return to clinic 4 weeks    present for visit       Shakira Lopez DNP, APRN, CNM

## 2019-01-02 NOTE — NURSING NOTE
"Chief Complaint   Patient presents with     Prenatal Care     18w 4d, had fall 18, c/o intermittent abdominal cramping, denied bleeding       Initial /58 (BP Location: Right arm, Cuff Size: Adult Regular)   Wt 83 kg (183 lb)   LMP 2018 (Exact Date)   BMI 33.47 kg/m   Estimated body mass index is 33.47 kg/m  as calculated from the following:    Height as of 18: 1.575 m (5' 2\").    Weight as of this encounter: 83 kg (183 lb).  BP completed using cuff size: regular    Questioned patient about current smoking habits.  Pt. has never smoked.          The following HM Due: NONE      Monique Hsu CMA           "

## 2019-01-22 ENCOUNTER — TELEPHONE (OUTPATIENT)
Dept: OBGYN | Facility: CLINIC | Age: 24
End: 2019-01-22

## 2019-01-22 NOTE — TELEPHONE ENCOUNTER
21w3d    Public Health nurse visited patient.    She states:  Hx of vaginal bleeding in pregnancy.  She is currently have light pink blood- but told the PHN that she had heavy bleeding yesterday with clots. Pt told her she has pubic pain and pain in bladder when she urinates.    She was advised to go into ED per PHN.  Pt told nurse that she does not want to go because she has her kids with her and was turned away previously due to this (?).     I called pt with :    Pt states she had fall in December.  Was seen in ED.  Had discomfort after this.  Yesterday she had vaginal bleeding, a pad lasted all day and was half full.  Blood was bright red. Bleeding has stopped but has some watery discharge.  Has pain in lower abdomen.  I told her to go to ED and tell them she has vaginal bleeding in pregnancy and watery vaginal leakage.    Paged on call midwife to let them know.    Charlotte MCMULLEN R.N.  [facility]

## 2019-01-24 ENCOUNTER — ANCILLARY PROCEDURE (OUTPATIENT)
Dept: ULTRASOUND IMAGING | Facility: CLINIC | Age: 24
End: 2019-01-24
Payer: COMMERCIAL

## 2019-01-24 DIAGNOSIS — Z34.82 ENCOUNTER FOR SUPERVISION OF OTHER NORMAL PREGNANCY IN SECOND TRIMESTER: ICD-10-CM

## 2019-01-24 PROCEDURE — 76805 OB US >/= 14 WKS SNGL FETUS: CPT | Performed by: FAMILY MEDICINE

## 2019-03-07 ENCOUNTER — PRENATAL OFFICE VISIT (OUTPATIENT)
Dept: OBGYN | Facility: CLINIC | Age: 24
End: 2019-03-07

## 2019-03-07 VITALS — DIASTOLIC BLOOD PRESSURE: 60 MMHG | BODY MASS INDEX: 33.47 KG/M2 | WEIGHT: 183 LBS | SYSTOLIC BLOOD PRESSURE: 106 MMHG

## 2019-03-07 DIAGNOSIS — Z11.3 SCREEN FOR STD (SEXUALLY TRANSMITTED DISEASE): ICD-10-CM

## 2019-03-07 DIAGNOSIS — Z34.82 SUPERVISION OF NORMAL INTRAUTERINE PREGNANCY IN MULTIGRAVIDA IN SECOND TRIMESTER: Primary | ICD-10-CM

## 2019-03-07 DIAGNOSIS — Z23 NEED FOR TDAP VACCINATION: ICD-10-CM

## 2019-03-07 LAB
ERYTHROCYTE [DISTWIDTH] IN BLOOD BY AUTOMATED COUNT: 12.6 % (ref 10–15)
HCT VFR BLD AUTO: 32.2 % (ref 35–47)
HGB BLD-MCNC: 10.4 G/DL (ref 11.7–15.7)
MCH RBC QN AUTO: 29.1 PG (ref 26.5–33)
MCHC RBC AUTO-ENTMCNC: 32.3 G/DL (ref 31.5–36.5)
MCV RBC AUTO: 90 FL (ref 78–100)
PLATELET # BLD AUTO: 241 10E9/L (ref 150–450)
RBC # BLD AUTO: 3.57 10E12/L (ref 3.8–5.2)
SPECIMEN SOURCE: NORMAL
WBC # BLD AUTO: 10.4 10E9/L (ref 4–11)
WET PREP SPEC: NORMAL

## 2019-03-07 PROCEDURE — 90715 TDAP VACCINE 7 YRS/> IM: CPT | Performed by: OBSTETRICS & GYNECOLOGY

## 2019-03-07 PROCEDURE — 90471 IMMUNIZATION ADMIN: CPT | Performed by: OBSTETRICS & GYNECOLOGY

## 2019-03-07 PROCEDURE — 0064U ANTB TP TOTAL&RPR IA QUAL: CPT | Performed by: OBSTETRICS & GYNECOLOGY

## 2019-03-07 PROCEDURE — 87210 SMEAR WET MOUNT SALINE/INK: CPT | Performed by: OBSTETRICS & GYNECOLOGY

## 2019-03-07 PROCEDURE — 87340 HEPATITIS B SURFACE AG IA: CPT | Performed by: OBSTETRICS & GYNECOLOGY

## 2019-03-07 PROCEDURE — 87491 CHLMYD TRACH DNA AMP PROBE: CPT | Performed by: OBSTETRICS & GYNECOLOGY

## 2019-03-07 PROCEDURE — 86803 HEPATITIS C AB TEST: CPT | Performed by: OBSTETRICS & GYNECOLOGY

## 2019-03-07 PROCEDURE — 87591 N.GONORRHOEAE DNA AMP PROB: CPT | Performed by: OBSTETRICS & GYNECOLOGY

## 2019-03-07 PROCEDURE — 36415 COLL VENOUS BLD VENIPUNCTURE: CPT | Performed by: OBSTETRICS & GYNECOLOGY

## 2019-03-07 PROCEDURE — 82950 GLUCOSE TEST: CPT | Performed by: OBSTETRICS & GYNECOLOGY

## 2019-03-07 PROCEDURE — 85027 COMPLETE CBC AUTOMATED: CPT | Performed by: OBSTETRICS & GYNECOLOGY

## 2019-03-07 PROCEDURE — 99213 OFFICE O/P EST LOW 20 MIN: CPT | Mod: 25 | Performed by: OBSTETRICS & GYNECOLOGY

## 2019-03-07 PROCEDURE — 87389 HIV-1 AG W/HIV-1&-2 AB AG IA: CPT | Performed by: OBSTETRICS & GYNECOLOGY

## 2019-03-07 NOTE — NURSING NOTE
"Chief Complaint   Patient presents with     Prenatal Care       Initial /60   Wt 83 kg (183 lb)   LMP 2018 (Exact Date)   Breastfeeding? No   BMI 33.47 kg/m   Estimated body mass index is 33.47 kg/m  as calculated from the following:    Height as of 18: 1.575 m (5' 2\").    Weight as of this encounter: 83 kg (183 lb).  BP completed using cuff size: regular    Questioned patient about current smoking habits.  Pt. has never smoked.          27w5d  + FM daily  + mild cramping  - bleeding  + vaginal pain, discharge and itching x several days  + pelvic pressure  + headache  - heartburn  Pauline Maier LPN               "

## 2019-03-07 NOTE — PROGRESS NOTES
"S: here for routine obstetric visit at 27w5d. Feels fetal movement, no vaginal bleeding, leakage of fluid, contractions. Glucose tolerance test today. Midwife patient. Does c/o vaginal itching and wants STD screening. Denies exposure to STDs but \"wants to check.\"    O: /60   Wt 83 kg (183 lb)   LMP 08/25/2018 (Exact Date)   Breastfeeding? No   BMI 33.47 kg/m    Abdomen nontender, see flowsheet.    A/P  1. Pregnancy at 27w5d     Glucose tolerance test, cbc, RPR, hep B and C, HIV, GCC, tdap today. Follow up with CNM service in 2 weeks.  If wet prep is positive, would treat with vaginal azoles--discussed with patient through .    Kristie Mathis MD    "

## 2019-03-08 LAB
C TRACH DNA SPEC QL NAA+PROBE: NEGATIVE
GLUCOSE 1H P 50 G GLC PO SERPL-MCNC: 96 MG/DL (ref 60–129)
HBV SURFACE AG SERPL QL IA: NONREACTIVE
HCV AB SERPL QL IA: NONREACTIVE
HIV 1+2 AB+HIV1 P24 AG SERPL QL IA: NONREACTIVE
N GONORRHOEA DNA SPEC QL NAA+PROBE: NEGATIVE
SPECIMEN SOURCE: NORMAL
SPECIMEN SOURCE: NORMAL
T PALLIDUM AB SER QL: NONREACTIVE

## 2019-03-20 ENCOUNTER — PRENATAL OFFICE VISIT (OUTPATIENT)
Dept: OBGYN | Facility: CLINIC | Age: 24
End: 2019-03-20

## 2019-03-20 VITALS — BODY MASS INDEX: 33.76 KG/M2 | SYSTOLIC BLOOD PRESSURE: 94 MMHG | WEIGHT: 184.6 LBS | DIASTOLIC BLOOD PRESSURE: 58 MMHG

## 2019-03-20 DIAGNOSIS — Z34.83 SUPERVISION OF NORMAL INTRAUTERINE PREGNANCY IN MULTIGRAVIDA IN THIRD TRIMESTER: Primary | ICD-10-CM

## 2019-03-20 PROCEDURE — 99212 OFFICE O/P EST SF 10 MIN: CPT | Performed by: OBSTETRICS & GYNECOLOGY

## 2019-03-20 PROCEDURE — T1013 SIGN LANG/ORAL INTERPRETER: HCPCS | Mod: U3 | Performed by: OBSTETRICS & GYNECOLOGY

## 2019-03-20 NOTE — NURSING NOTE
"Chief Complaint   Patient presents with     Prenatal Care   29w4d  Discuss hgb results - not taking prenatal due to nausea  Here with     Initial BP 94/58   Wt 83.7 kg (184 lb 9.6 oz)   LMP 2018 (Exact Date)   BMI 33.76 kg/m   Estimated body mass index is 33.76 kg/m  as calculated from the following:    Height as of 18: 1.575 m (5' 2\").    Weight as of this encounter: 83.7 kg (184 lb 9.6 oz).  BP completed using cuff size: large    Questioned patient about current smoking habits.  Pt. has never smoked.          The following HM Due: NONE    Lupe Rosales CMA               "

## 2019-03-20 NOTE — PROGRESS NOTES
"Routine OB visit.   present.     Still with vaginal itching but no different than last visit when screening was negative.  Offered recheck but patient declined.    Pelvic pressure and LBP.  NOT like ctx.  Likely round ligament pain and \"growing pains\" explained.    Hgb 10.4.  Given information on Fe rich foods in Syriac    RTC 2 weeks.  Desires MD care  "

## 2019-03-26 ENCOUNTER — TELEPHONE (OUTPATIENT)
Dept: OBGYN | Facility: CLINIC | Age: 24
End: 2019-03-26

## 2019-03-26 NOTE — TELEPHONE ENCOUNTER
Pt calling. 30w3d. States ever since 5 days ago she has felt weak and everything hurts. Last hemoglobin shows some anemia. Also has not been sleeping well. Pt denies having contractions, but says that every part of her body aches. Pt denies other flu-like symptoms, but says she thinks she had a fever yesterday. She did not check her temperature. Does not feel warm today. States she has been drinking plenty of water. Also says she has not had much fetal movement today. Plan to send to L&D for evaluation. Pt says she does not have transportation at any time during the day today. Told her it is important we evaluate her and baby as soon as we can to make sure everything is okay. Near the end of the call, pt also says she is unable to move due to aches and is short of breath. Said if she is short of breath she should call an ambulance. Pt says she will call. L&D and Dr Rico notified in case pt arrives on the unit.        Tenisha Escobedo RN

## 2019-03-29 ENCOUNTER — PRENATAL OFFICE VISIT (OUTPATIENT)
Dept: OBGYN | Facility: CLINIC | Age: 24
End: 2019-03-29

## 2019-03-29 ENCOUNTER — TELEPHONE (OUTPATIENT)
Dept: OBGYN | Facility: CLINIC | Age: 24
End: 2019-03-29

## 2019-03-29 VITALS
BODY MASS INDEX: 33.84 KG/M2 | TEMPERATURE: 98.6 F | HEART RATE: 104 BPM | WEIGHT: 185 LBS | DIASTOLIC BLOOD PRESSURE: 60 MMHG | SYSTOLIC BLOOD PRESSURE: 102 MMHG

## 2019-03-29 DIAGNOSIS — Z34.83 ENCOUNTER FOR SUPERVISION OF OTHER NORMAL PREGNANCY IN THIRD TRIMESTER: Primary | ICD-10-CM

## 2019-03-29 DIAGNOSIS — R30.0 DYSURIA: ICD-10-CM

## 2019-03-29 LAB
ALBUMIN UR-MCNC: NEGATIVE MG/DL
AMORPH CRY #/AREA URNS HPF: ABNORMAL /HPF
APPEARANCE UR: CLEAR
BACTERIA #/AREA URNS HPF: ABNORMAL /HPF
BILIRUB UR QL STRIP: NEGATIVE
COLOR UR AUTO: YELLOW
GLUCOSE UR STRIP-MCNC: NEGATIVE MG/DL
HGB UR QL STRIP: NEGATIVE
KETONES UR STRIP-MCNC: NEGATIVE MG/DL
LEUKOCYTE ESTERASE UR QL STRIP: ABNORMAL
NITRATE UR QL: NEGATIVE
NON-SQ EPI CELLS #/AREA URNS LPF: ABNORMAL /LPF
PH UR STRIP: 7 PH (ref 5–7)
RBC #/AREA URNS AUTO: ABNORMAL /HPF
SOURCE: ABNORMAL
SP GR UR STRIP: 1.02 (ref 1–1.03)
UROBILINOGEN UR STRIP-ACNC: 1 EU/DL (ref 0.2–1)
WBC #/AREA URNS AUTO: ABNORMAL /HPF

## 2019-03-29 PROCEDURE — 81001 URINALYSIS AUTO W/SCOPE: CPT | Performed by: OBSTETRICS & GYNECOLOGY

## 2019-03-29 PROCEDURE — 99212 OFFICE O/P EST SF 10 MIN: CPT | Performed by: OBSTETRICS & GYNECOLOGY

## 2019-03-29 RX ORDER — NITROFURANTOIN 25; 75 MG/1; MG/1
100 CAPSULE ORAL 2 TIMES DAILY
Qty: 14 CAPSULE | Refills: 0 | Status: SHIPPED | OUTPATIENT
Start: 2019-03-29 | End: 2019-05-01

## 2019-03-29 NOTE — TELEPHONE ENCOUNTER
Homehealth nurse called stating that pt is having urinary burning and pain.  She now has pain and pressure in her lower abd.  Nurse states pt had these sx last week and never came in for it.    Scheduled for appt today.    Charlotte MCMULLEN R.N.  [facility]

## 2019-03-29 NOTE — NURSING NOTE
"Chief Complaint   Patient presents with     Prenatal Care     baby active and moving - pain and pressure with urination x's 2 days     30w6d    Initial /60 (BP Location: Right arm, Patient Position: Chair, Cuff Size: Adult Regular)   Pulse 104   Temp 98.6  F (37  C)   Wt 83.9 kg (185 lb)   LMP 2018 (Exact Date)   BMI 33.84 kg/m   Estimated body mass index is 33.84 kg/m  as calculated from the following:    Height as of 18: 1.575 m (5' 2\").    Weight as of this encounter: 83.9 kg (185 lb).  BP completed using cuff size: regular    Questioned patient about current smoking habits.  Pt. has never smoked.          The following HM Due: NONE        Devorah Lynch ma               "

## 2019-04-04 ENCOUNTER — PRENATAL OFFICE VISIT (OUTPATIENT)
Dept: OBGYN | Facility: CLINIC | Age: 24
End: 2019-04-04
Payer: COMMERCIAL

## 2019-04-04 VITALS — DIASTOLIC BLOOD PRESSURE: 58 MMHG | WEIGHT: 182 LBS | BODY MASS INDEX: 33.29 KG/M2 | SYSTOLIC BLOOD PRESSURE: 112 MMHG

## 2019-04-04 DIAGNOSIS — Z34.83 ENCOUNTER FOR SUPERVISION OF OTHER NORMAL PREGNANCY IN THIRD TRIMESTER: ICD-10-CM

## 2019-04-04 DIAGNOSIS — N89.8 VAGINAL DISCHARGE: Primary | ICD-10-CM

## 2019-04-04 LAB
SPECIMEN SOURCE: NORMAL
WET PREP SPEC: NORMAL

## 2019-04-04 PROCEDURE — T1013 SIGN LANG/ORAL INTERPRETER: HCPCS | Mod: U3 | Performed by: OBSTETRICS & GYNECOLOGY

## 2019-04-04 PROCEDURE — 99207 ZZC PRENATAL VISIT: CPT | Performed by: OBSTETRICS & GYNECOLOGY

## 2019-04-04 PROCEDURE — 87210 SMEAR WET MOUNT SALINE/INK: CPT | Performed by: OBSTETRICS & GYNECOLOGY

## 2019-04-04 NOTE — LETTER
[facility]  [address], [address]  [place] [address]  [phone]        April 5, 2019    Katelyn Hollingsworth  [address]  [facility] [address]            Dear Ms. Katelyn Hollingsworth:      The results of your recent Wet Prep were NORMAL.   Results for orders placed or performed in visit on 04/04/19   Wet prep   Result Value Ref Range    Specimen Description Vagina     Wet Prep No Trichomonas seen     Wet Prep No clue cells seen     Wet Prep No yeast seen     Wet Prep Few  WBC'S seen         If you have any further questions or problems, please contact our office.    Sincerely,      Kristie Mathis MD

## 2019-04-04 NOTE — NURSING NOTE
"Chief Complaint   Patient presents with     Prenatal Care       Initial /58   Wt 82.6 kg (182 lb)   LMP 2018 (Exact Date)   Breastfeeding? No   BMI 33.29 kg/m   Estimated body mass index is 33.29 kg/m  as calculated from the following:    Height as of 18: 1.575 m (5' 2\").    Weight as of this encounter: 82.6 kg (182 lb).  BP completed using cuff size: regular    Questioned patient about current smoking habits.  Pt. has never smoked.          31w5d  + FM daily  + headache   + mild heartburn  + cramping  - bleeding or leaking of fluid  + vaginal discharge, odor and itching  + UTI symptoms increased despite Macrobid x 6 days  Pauline Maier LPN               "

## 2019-04-05 NOTE — PROGRESS NOTES
PROBLEM LIST  LABS: Opos/RI  GIRL    1. Multip.    She has many questions today.  She continues to have vaginal irritation.  She has had multiple tests done to check this out and all have been negative.  We discussed today repeating a urine sample for urine culture.  We also discussed repeating a wet prep.  If these are all negative, I think she is likely experiencing a normal increase in vaginal discharge, which may just be irritating to her and causing her symptoms.  She states her understanding.  Follow-up in 2 weeks.  I confirmed with her today that she would like to be an MD patient and not a midwife patient.  Reiterated that she is certainly appropriate for midwife care if she chooses.    Kristie Mathis MD

## 2019-04-18 ENCOUNTER — PRENATAL OFFICE VISIT (OUTPATIENT)
Dept: OBGYN | Facility: CLINIC | Age: 24
End: 2019-04-18
Payer: COMMERCIAL

## 2019-04-18 VITALS — SYSTOLIC BLOOD PRESSURE: 114 MMHG | BODY MASS INDEX: 33.29 KG/M2 | WEIGHT: 182 LBS | DIASTOLIC BLOOD PRESSURE: 56 MMHG

## 2019-04-18 DIAGNOSIS — Z34.83 ENCOUNTER FOR SUPERVISION OF OTHER NORMAL PREGNANCY IN THIRD TRIMESTER: ICD-10-CM

## 2019-04-18 DIAGNOSIS — Z78.9 NEED FOR FOLLOW-UP BY SOCIAL WORKER: Primary | ICD-10-CM

## 2019-04-18 DIAGNOSIS — N89.8 VAGINAL DISCHARGE: ICD-10-CM

## 2019-04-18 DIAGNOSIS — R30.0 DYSURIA: ICD-10-CM

## 2019-04-18 LAB
SPECIMEN SOURCE: NORMAL
WET PREP SPEC: NORMAL

## 2019-04-18 PROCEDURE — 87086 URINE CULTURE/COLONY COUNT: CPT | Performed by: OBSTETRICS & GYNECOLOGY

## 2019-04-18 PROCEDURE — 87210 SMEAR WET MOUNT SALINE/INK: CPT | Performed by: OBSTETRICS & GYNECOLOGY

## 2019-04-18 PROCEDURE — 99207 ZZC PRENATAL VISIT: CPT | Performed by: OBSTETRICS & GYNECOLOGY

## 2019-04-18 NOTE — PROGRESS NOTES
PROBLEM LIST  LABS: Opos/RI  GIRL    1. Multip.    She has many questions again today.  She continues to have vaginal irritation.  Repeatedly tested for UTI, yeast, bacterial vaginosis: negative. Will repeat UA and wet prep today, but reiterated that this may just be normal increase in vaginal discharge in pregnancy.    Kick counts printed information given. Reviewed when to call for concerns, changes.  Having difficulty with transportation to [facility]--care coordination referral placed.    Kristie Mathis MD

## 2019-04-18 NOTE — NURSING NOTE
"Chief Complaint   Patient presents with     Prenatal Care       Initial /56   Wt 82.6 kg (182 lb)   LMP 2018 (Exact Date)   Breastfeeding? No   BMI 33.29 kg/m   Estimated body mass index is 33.29 kg/m  as calculated from the following:    Height as of 18: 1.575 m (5' 2\").    Weight as of this encounter: 82.6 kg (182 lb).  BP completed using cuff size: regular    Questioned patient about current smoking habits.  Pt. has never smoked.          33w5d  + FM daily  + cramping bilateral - difficult to lay on either side  + light pink spotting  + contractions/pelvic pain  + headache often  - heartburn  + edema in feet  Pauline Maier LPN                  "

## 2019-04-19 ENCOUNTER — PATIENT OUTREACH (OUTPATIENT)
Dept: CARE COORDINATION | Facility: CLINIC | Age: 24
End: 2019-04-19

## 2019-04-19 LAB
BACTERIA SPEC CULT: NO GROWTH
SPECIMEN SOURCE: NORMAL

## 2019-04-19 ASSESSMENT — ACTIVITIES OF DAILY LIVING (ADL): DEPENDENT_IADLS:: INDEPENDENT

## 2019-04-19 NOTE — PROGRESS NOTES
Clinic Care Coordination Contact  [facility]/Voicemail    Referral Source: Specialist  Clinical Data: Care Coordinator Outreach  Patient is worried about transportation to and from appointments because her car broke down. Per review of [place], Patient has active [employer] [place] and a health ride benefit to/from appointments.    Outreach attempted x 1.  Left message on voicemail with call back information and requested return call.  Plan: Care Coordinator will mail out care coordination introduction letter with care coordinator contact information and explanation of care coordination services.  CC also included information for the [facility] Ride line so that Pt can schedule free transportation while he car is broken. Care Coordinator will try to reach patient again in 3-5 business days.    Rosario Hahn [facility]  Clinic Care Coordinator  Ph. [phone]  [email or web address]

## 2019-04-19 NOTE — LETTER
[facility] CARE COORDINATION  [address] [place], [place], [address]  April 19, 2019    Katelyn Hollingsworth  [address]  [facility] [address]      Querido KatelynWarner un coordinador de atención clínica que trabaja en la [facility]. Recientemente traté de llamar y no pude comunicarme con usted. Quería presentarme y proporcionarle mi información de contacto para que pueda llamarme con preguntas o inquietudes sobre giles atención médica. Se incluye un folleto sobre la coordinación de la atención clínica y cómo puedo ayudarlo más.    Quería hacerle saber que es elegible para transporte gratuito a / desde citas médicas a través de giles seguro. Puede llamar a [employer] para programar un viaje:  [employer] (Transporte)  Local: [phone]  Línea gratuita: [phone]  Lunes a viernes de 7:00 a 20:00.  Sábado-chris: 8 am - 6 pm    Si necesita ayuda con la interpretación, utilice nuestra línea de interpretación: [phone].    No dude en contactarme al [phone], si tiene alguna pregunta o inquietud. En [place] nos enfocamos en brindarle la mejor experiencia de atención médica posible y eso comienza con usted.    Rosario Cedeno, [facility]  Clinic Care Coordinator  Ph. [phone]  [email or web address]

## 2019-05-01 ENCOUNTER — PRENATAL OFFICE VISIT (OUTPATIENT)
Dept: OBGYN | Facility: CLINIC | Age: 24
End: 2019-05-01
Payer: COMMERCIAL

## 2019-05-01 VITALS — WEIGHT: 183.5 LBS | SYSTOLIC BLOOD PRESSURE: 94 MMHG | DIASTOLIC BLOOD PRESSURE: 54 MMHG | BODY MASS INDEX: 33.56 KG/M2

## 2019-05-01 DIAGNOSIS — Z34.83 ENCOUNTER FOR SUPERVISION OF OTHER NORMAL PREGNANCY IN THIRD TRIMESTER: Primary | ICD-10-CM

## 2019-05-01 PROCEDURE — 99207 ZZC PRENATAL VISIT: CPT | Performed by: OBSTETRICS & GYNECOLOGY

## 2019-05-01 PROCEDURE — 87653 STREP B DNA AMP PROBE: CPT | Performed by: OBSTETRICS & GYNECOLOGY

## 2019-05-01 NOTE — NURSING NOTE
"Chief Complaint   Patient presents with     Prenatal Care   35w4d  Here with    Difficult sleeping due to pressure  abd hard all the time    Initial BP 94/54   Wt 83.2 kg (183 lb 8 oz)   LMP 2018 (Exact Date)   BMI 33.56 kg/m   Estimated body mass index is 33.56 kg/m  as calculated from the following:    Height as of 18: 1.575 m (5' 2\").    Weight as of this encounter: 83.2 kg (183 lb 8 oz).  BP completed using cuff size: regular    Questioned patient about current smoking habits.  Pt. has never smoked.          The following HM Due: ANA Rosales CMA             "

## 2019-05-01 NOTE — PROGRESS NOTES
Abdomen feels hard.  No ctx.  Baby moving throughout day.    GBS obtained.  L&D discussed.  RTC 1 week.

## 2019-05-02 LAB
GP B STREP DNA SPEC QL NAA+PROBE: NEGATIVE
SPECIMEN SOURCE: NORMAL

## 2019-05-05 ENCOUNTER — HOSPITAL ENCOUNTER (OUTPATIENT)
Facility: CLINIC | Age: 24
Discharge: HOME | End: 2019-05-05
Attending: OBSTETRICS & GYNECOLOGY | Admitting: OBSTETRICS & GYNECOLOGY
Payer: COMMERCIAL

## 2019-05-05 VITALS
HEART RATE: 89 BPM | SYSTOLIC BLOOD PRESSURE: 98 MMHG | DIASTOLIC BLOOD PRESSURE: 55 MMHG | TEMPERATURE: 97.7 F | RESPIRATION RATE: 17 BRPM

## 2019-05-05 PROBLEM — R10.9 ABDOMINAL PAIN: Status: ACTIVE | Noted: 2019-05-05

## 2019-05-05 LAB
ALBUMIN UR-MCNC: NEGATIVE MG/DL
AMORPH CRY #/AREA URNS HPF: ABNORMAL /HPF
APPEARANCE UR: CLEAR
BILIRUB UR QL STRIP: NEGATIVE
COLOR UR AUTO: ABNORMAL
GLUCOSE UR STRIP-MCNC: NEGATIVE MG/DL
HGB UR QL STRIP: NEGATIVE
KETONES UR STRIP-MCNC: NEGATIVE MG/DL
LEUKOCYTE ESTERASE UR QL STRIP: NEGATIVE
MUCOUS THREADS #/AREA URNS LPF: PRESENT /LPF
NITRATE UR QL: NEGATIVE
PH UR STRIP: 7 PH (ref 5–7)
RBC #/AREA URNS AUTO: <1 /HPF (ref 0–2)
SOURCE: ABNORMAL
SP GR UR STRIP: 1.02 (ref 1–1.03)
SQUAMOUS #/AREA URNS AUTO: 1 /HPF (ref 0–1)
UROBILINOGEN UR STRIP-MCNC: 2 MG/DL (ref 0–2)
WBC #/AREA URNS AUTO: 2 /HPF (ref 0–5)

## 2019-05-05 PROCEDURE — 59025 FETAL NON-STRESS TEST: CPT

## 2019-05-05 PROCEDURE — 25000132 ZZH RX MED GY IP 250 OP 250 PS 637: Performed by: OBSTETRICS & GYNECOLOGY

## 2019-05-05 PROCEDURE — 59025 FETAL NON-STRESS TEST: CPT | Mod: 26 | Performed by: OBSTETRICS & GYNECOLOGY

## 2019-05-05 PROCEDURE — G0463 HOSPITAL OUTPT CLINIC VISIT: HCPCS | Mod: 25

## 2019-05-05 PROCEDURE — 81001 URINALYSIS AUTO W/SCOPE: CPT | Performed by: OBSTETRICS & GYNECOLOGY

## 2019-05-05 RX ORDER — POLYETHYLENE GLYCOL 3350 17 G/17G
17 POWDER, FOR SOLUTION ORAL ONCE
Status: COMPLETED | OUTPATIENT
Start: 2019-05-05 | End: 2019-05-05

## 2019-05-05 RX ORDER — POLYETHYLENE GLYCOL 3350 17 G/17G
17 POWDER, FOR SOLUTION ORAL DAILY
Status: DISCONTINUED | OUTPATIENT
Start: 2019-05-06 | End: 2019-05-06 | Stop reason: HOSPADM

## 2019-05-05 RX ORDER — ONDANSETRON 2 MG/ML
4 INJECTION INTRAMUSCULAR; INTRAVENOUS EVERY 6 HOURS PRN
Status: DISCONTINUED | OUTPATIENT
Start: 2019-05-05 | End: 2019-05-06 | Stop reason: HOSPADM

## 2019-05-05 RX ADMIN — POLYETHYLENE GLYCOL 3350 17 G: 17 POWDER, FOR SOLUTION ORAL at 23:27

## 2019-05-05 RX ADMIN — SODIUM PHOSPHATE, DIBASIC AND SODIUM PHOSPHATE, MONOBASIC 1 ENEMA: 7; 19 ENEMA RECTAL at 22:23

## 2019-05-06 ENCOUNTER — TELEPHONE (OUTPATIENT)
Dept: OBGYN | Facility: CLINIC | Age: 24
End: 2019-05-06

## 2019-05-06 DIAGNOSIS — O26.899 PELVIC PRESSURE IN PREGNANCY: Primary | ICD-10-CM

## 2019-05-06 DIAGNOSIS — R10.2 PELVIC PRESSURE IN PREGNANCY: Primary | ICD-10-CM

## 2019-05-06 NOTE — TELEPHONE ENCOUNTER
36w2d    Pt calls with continuing pelvic pressure, especially when walking.  She was seen in L&d yesterday and had urine test done.  Cleared to go home, but calls as she is still uncomfortable.    Ordered maternity belt for her to [employer].  Has appt 5/9/19.      Advised to call if cxts, watery leakage, dec fetal movement.    Routed to MD. Charlotte MCMULLEN R.N.  [facility]

## 2019-05-06 NOTE — PLAN OF CARE
Explained fleets enema to patient via ipad . Patient verbalized understanding. Will continue to monitor and update as needed.

## 2019-05-06 NOTE — PLAN OF CARE
MD updated of patient arrival and situation. Orders for fleets enema. Will continue to monitor and update as needed.

## 2019-05-06 NOTE — PLAN OF CARE
Data: Patient presented to Birthplace: 2019  9:17 PM.  Reason for maternal/fetal assessment is abdominal pain. Patient reports pain started at 1900, severe sharp low belly pain and pressure, feels different than labor.  Patient is a .  Prenatal record reviewed. Pregnancy has been uncomplicated..  Gestational Age 36w1d. VSS. Fetal movement present. Patient denies uterine contractions, leaking of vaginal fluid/rupture of membranes, vaginal bleeding, pelvic pressure, nausea, vomiting, headache, visual disturbances, epigastric or URQ pain, significant edema. Support person is not present.   Action: Verbal consent for EFM. Triage assessment completed. Bill of rights reviewed. SVE 1.550/-3, patient very constipated, UA sent  Response: Patient verbalized agreement with plan via ipad . Will contact Dr David Gruber with update and further orders.

## 2019-05-06 NOTE — PLAN OF CARE
Data: Patient assessed in the Birthplace for abdominal pain.  Cervical exam essentially unchanged.  Membranes intact.  Contractions not present.  Action:  Presumed adequate fetal oxygenation documented (see flow record). Discharge instructions reviewed.  Patient instructed to report change in fetal movement, vaginal leaking of fluid or bleeding, abdominal pain, or any concerns related to the pregnancy to her nurse/physician.    Response: Orders to discharge home per David Gruber.  Patient verbalized understanding of education and verbalized agreement with plan. Discharged to home.

## 2019-05-06 NOTE — PLAN OF CARE
Updated Dr Gruber. Fleet Enema given, patient was successful and had a bowel movement. Patient does feel better, but still has a mild discomfort. Also update the UA results of crystal noted. Plan to give patient miralax now and then discharge to home to follow up with Dr Mathis this week in clinic.

## 2019-05-06 NOTE — PLAN OF CARE
All discharge instructions discussed via ipad  [id number]. Patient verbalized understanding and is now rating pain 3/10.

## 2019-05-06 NOTE — DISCHARGE INSTRUCTIONS
Undelivered Patients Discharge Instructions: Iranian    La vieron por: Labor Assessment  Consultamos a: Dr Yasmani Painting hicieron/recibió (examen o medicamento): monitoring, UA, and enema    Dieta:   Drink 8 to 12 glasses of liquids (milk, juice, water) every day.  You may eat meals and snacks.     Actividad:  Call your doctor or nurse midwife if your baby is moving less than usual.     Llame a giles proveedor si nota:    Hinchazón en el karie o aumento de la hinchazón en abhijit susie o piernas.    Ching de nora que no se alivian con Tylenol (acetaminofén).    Cambios en giles visión (borrosa: ve manchas o estrellas.)    Náuseas (sensación de malestar estomacal) y vómitos (devolver).    Aumento de peso de 5 libras o más por semana.    Acidez estomacal que no se shakeel.    Signos de infección de vejiga: dolor al orinar (hacer pis), necesidad de ir con más frecuencia y más urgencia.    Se rompe la bolsa (ruptura de membranas) o nota que gotea en giles ropa interior.    Sonny luke brillante en giles ropa interior.    Dolor en la parte baja del vientre (abdomen) o estómago.    Para el primer bebé: Contracciones (tirantez) con menos de 5 minutos de diferencia entre kelle y otra por kelle hora o más.    Aquilla bebé (en adelante): Contracciones (tirantez) con menos de 10 minutos de diferencia entre kelle y otra y cada vez más nargis.    Aumento o cambio en las secreciones vaginales (note el color y la cantidad)    Antes de las 37 semanas, llame si nota los siguientes:    Contracciones que se dan cada 10 minutos o más     Cambios en las secreciones vaginales    Presión pélvica    Dolor de espalda sordo en la región lumbar    Calambres que se sienten kerline kelle menstruación    Calambres abdominales con o sin diarrea        Otro: Make an appointment to be seen on 5/6-5/7

## 2019-05-07 NOTE — PROGRESS NOTES
Clinic Care Coordination Contact  BYRON CC Follow-Up    Voicemail received from Patient in Armenian. Call back placed to Pt with the assistance of a . Patient reports that she has been able to access transportation and to schedule it. Pt wanted to inquire what else BYRON LAURENT could assist with. Information on this worker's role provided to Patient. Patient endorses no further needs from clinic care coordination at this time.     Plan: No further requested follow-up from Patient. No further outreaches will be made at this time unless a new referral is made or a change in the pt's status occurs. Patient was provided with this writer's contact information and encouraged to call with any questions or concerns.    Rosario Hahn, [facility]  Clinic Care Coordinator  Ph. [phone]  [email or web address]

## 2019-05-09 ENCOUNTER — PRENATAL OFFICE VISIT (OUTPATIENT)
Dept: OBGYN | Facility: CLINIC | Age: 24
End: 2019-05-09
Payer: COMMERCIAL

## 2019-05-09 VITALS
WEIGHT: 184.8 LBS | BODY MASS INDEX: 34.01 KG/M2 | HEIGHT: 62 IN | DIASTOLIC BLOOD PRESSURE: 56 MMHG | SYSTOLIC BLOOD PRESSURE: 98 MMHG

## 2019-05-09 DIAGNOSIS — Z34.83 ENCOUNTER FOR SUPERVISION OF OTHER NORMAL PREGNANCY IN THIRD TRIMESTER: Primary | ICD-10-CM

## 2019-05-09 PROCEDURE — T1013 SIGN LANG/ORAL INTERPRETER: HCPCS | Mod: U3 | Performed by: OBSTETRICS & GYNECOLOGY

## 2019-05-09 PROCEDURE — 99207 ZZC PRENATAL VISIT: CPT | Performed by: OBSTETRICS & GYNECOLOGY

## 2019-05-09 ASSESSMENT — MIFFLIN-ST. JEOR: SCORE: 1546.5

## 2019-05-09 NOTE — NURSING NOTE
"36w5d    Chief Complaint   Patient presents with     Prenatal Care       Initial BP 98/56 (BP Location: Right arm, Patient Position: Sitting, Cuff Size: Adult Regular)   Ht 1.575 m (5' 2\")   Wt 83.8 kg (184 lb 12.8 oz)   LMP 2018 (Exact Date)   BMI 33.80 kg/m   Estimated body mass index is 33.8 kg/m  as calculated from the following:    Height as of this encounter: 1.575 m (5' 2\").    Weight as of this encounter: 83.8 kg (184 lb 12.8 oz).  BP completed using cuff size: regular    Questioned patient about current smoking habits.  Pt. has never smoked.          The following HM Due: NONE             "

## 2019-05-10 NOTE — PROGRESS NOTES
PROBLEM LIST  LABS: Opos/RI  GIRL    1. Multip.    Tired of being pregnant, lots of pelvic pressure. Asking about IOL: discussed parameters. Good fetal movement, a few contractions. Vertex presentation confirmed on US today. Follow up weekly.    Kristie Mathis MD

## 2019-05-16 ENCOUNTER — PRENATAL OFFICE VISIT (OUTPATIENT)
Dept: OBGYN | Facility: CLINIC | Age: 24
End: 2019-05-16
Payer: COMMERCIAL

## 2019-05-16 VITALS
SYSTOLIC BLOOD PRESSURE: 98 MMHG | BODY MASS INDEX: 34.19 KG/M2 | WEIGHT: 185.8 LBS | HEIGHT: 62 IN | DIASTOLIC BLOOD PRESSURE: 60 MMHG

## 2019-05-16 DIAGNOSIS — Z34.83 ENCOUNTER FOR SUPERVISION OF OTHER NORMAL PREGNANCY IN THIRD TRIMESTER: Primary | ICD-10-CM

## 2019-05-16 PROCEDURE — 99207 ZZC PRENATAL VISIT: CPT | Performed by: OBSTETRICS & GYNECOLOGY

## 2019-05-16 ASSESSMENT — MIFFLIN-ST. JEOR: SCORE: 1551.03

## 2019-05-16 NOTE — NURSING NOTE
"Chief Complaint   Patient presents with     Prenatal Care     37w5d       Initial BP 98/60   Ht 1.575 m (5' 2\")   Wt 84.3 kg (185 lb 12.8 oz)   LMP 2018 (Exact Date)   BMI 33.98 kg/m   Estimated body mass index is 33.98 kg/m  as calculated from the following:    Height as of this encounter: 1.575 m (5' 2\").    Weight as of this encounter: 84.3 kg (185 lb 12.8 oz).  BP completed using cuff size: regular    Questioned patient about current smoking habits.  Pt. has never smoked.          The following HM Due: NONE      She stated she has been having contractions at night every 25 min for 2 hours and had some bloody show 3 days ago.    Jostin Ramos MA               "

## 2019-05-16 NOTE — PROGRESS NOTES
PROBLEM LIST  LABS: Opos/RI  GIRL    1. Multip.    Doing well, a few contractions. Follow up in 1 week.    Kristie Mathis MD

## 2019-05-20 ENCOUNTER — NURSE TRIAGE (OUTPATIENT)
Dept: NURSING | Facility: CLINIC | Age: 24
End: 2019-05-20

## 2019-05-21 ENCOUNTER — HOSPITAL ENCOUNTER (OUTPATIENT)
Facility: CLINIC | Age: 24
Discharge: HOME | End: 2019-05-21
Attending: OBSTETRICS & GYNECOLOGY | Admitting: OBSTETRICS & GYNECOLOGY
Payer: COMMERCIAL

## 2019-05-21 VITALS — TEMPERATURE: 97.7 F | SYSTOLIC BLOOD PRESSURE: 106 MMHG | DIASTOLIC BLOOD PRESSURE: 53 MMHG | RESPIRATION RATE: 16 BRPM

## 2019-05-21 PROCEDURE — G0463 HOSPITAL OUTPT CLINIC VISIT: HCPCS

## 2019-05-21 PROCEDURE — 25000132 ZZH RX MED GY IP 250 OP 250 PS 637: Performed by: OBSTETRICS & GYNECOLOGY

## 2019-05-21 RX ORDER — ZOLPIDEM TARTRATE 5 MG/1
5 TABLET ORAL ONCE
Status: COMPLETED | OUTPATIENT
Start: 2019-05-21 | End: 2019-05-21

## 2019-05-21 RX ADMIN — ZOLPIDEM TARTRATE 5 MG: 5 TABLET, FILM COATED ORAL at 04:02

## 2019-05-21 NOTE — PROVIDER NOTIFICATION
05/21/19 0325   Provider Notification   Provider Name/Title Dr. Gruber   Method of Notification Phone   Request Evaluate - Remote   Notification Reason Patient Arrived;Uterine Activity;SVE     Dr. Gruber updated on Pt complaints, see previous note. Pt is currently j[place] every 4-6 minutes, and is breathing through her contractions. Membranes intact. FHR category 1. SVE 1-2/40/-2 upon admission at 0145, SVE unchanged at 0318. MD states to discharge Pt to home, Pt may take Ambien 5 mg PO before discharge. Will update Pt with plan of care.

## 2019-05-21 NOTE — TELEPHONE ENCOUNTER
"Caller: self with  [place], [place] [id number]  Reason for call: \"I'm 38 weeks pregnant, I feel like my baby wants to come out, I cant get up and my stomach feels hard, contractions but not regular, lots of pressure vaginally, feels stomach is low.\"  Reports she is home alone until 3am and her last labor was \"rapid\".  Symptoms: \"stomach feels hard\", contractions every 15-20 minutes (\"pain so intense I can't talk\"), lots of pressure vaginally, feels stomach is low  Symptoms started: 10 pm today   Denies SROM or vaginal bleeding  USMAN: 19, 38w2d,    Vaginal fluids, bleeding or unusual discharge? denies    Changes in fetal movement? unsure  Abdominal Pain? Yes, q15-20 minutes x 2 hours, \"pain so intense I can't talk\"   Emergent symptoms reviewed.  Care advice given per triage protocol. Triage Disposition: advised caller to speak to the on call provider now for 2nd level triage since she will be home alone until 3am; if pain gets worse or she feels the baby is coming now, call 911 for ambulance.     Caller verbalized understanding of care advice given and plans to speak to the on call provider now. Caller had no further questions.     This nurse paged the on call provider (Dr. River Gruber) for the [facility] at 11:47pm via smart web to call the patient back directly at [phone] for 2nd level triage. Caller advised to call FNA back if no call from provider within 20-30 minutes. Caller agreed.    Kateryna Cat RN  [facility]  [employer] message:   Patient: Katelyn Chaney  : 1995  Call Back # [phone], Japanese  Syn: 23 yr old, USMAN: 19, 38w2d, , contrat x15-20 min/2 hrs, vaginal pressure, - SROM, home alone til 3am  OB Del Avendaño, LISA    Reason for Disposition    MODERATE-SEVERE abdominal pain    Additional Information    Negative: Passed out (i.e., lost consciousness, collapsed and was not responding)    Negative: Shock suspected (e.g., " "cold/pale/clammy skin, too weak to stand, low BP, rapid pulse)    Negative: Difficult to awaken or acting confused (e.g., disoriented, slurred speech)    Negative: [1] SEVERE abdominal pain (e.g., excruciating) AND [2] constant AND [3] present > 1 hour    Negative: Severe bleeding (e.g., continuous red blood from vagina, or large blood clots)    Negative: Umbilical cord hanging out of the vagina (shiny, white, curled appearance, \"like telephone cord\")    Negative: Uncontrollable urge to push (i.e., feels like baby is coming out now)    Negative: Can see baby    Negative: Sounds like a life-threatening emergency to the triager    Negative: Pregnant < 37 weeks (i.e., )    Negative: [1] Uncertain delivery date AND [2] possibly pregnant < 37 weeks (i.e., )    Negative: [1] First baby (primipara) AND [2] contractions < 6 minutes apart  AND [3] present 2 hours    Negative: [1] History of prior delivery (multipara) AND [2] contractions < 10 minutes apart AND [3] present 1 hour    Negative: [1] History of rapid prior delivery AND [2] contractions < 10 minutes apart    Negative: [1] Leakage of fluid from vagina AND [2] green or brown in color    Negative: [1] Leakage of fluid from vagina AND [2] leakage started > 4 hours ago    Negative: Vaginal bleeding or spotting    Negative: Baby moving less today (e.g., kick count < 5 in 1 hour or < 10 in 2 hours)    Negative: Severe headache or headache that won't go away    Negative: New blurred vision or vision changes    Protocols used: PREGNANCY - LABOR-A-      "

## 2019-05-21 NOTE — PLAN OF CARE
Data: Patient assessed in the [facility]place for uterine contractions.  Cervical exam 1.5/40/-2, cervix unchanged.  Membranes intact.  Contractions every 3-4.5 minutes.  Action:  Presumed adequate fetal oxygenation documented (see flow record). Discharge instructions reviewed.  Patient instructed to report change in fetal movement, vaginal leaking of fluid or bleeding, abdominal pain, or any concerns related to the pregnancy to her nurse/physician.    Response: Orders to discharge home per David Gruber.  Patient verbalized understanding of education and verbalized agreement with plan. Discharged to home at 0410.

## 2019-05-21 NOTE — DISCHARGE INSTRUCTIONS
Discharge Instruction for Undelivered Patients      You were seen for: Labor Assessment  We Consulted: Dr. Gruber  You had (Test or Medicine): Contraction and fetal monitoring, Ambien sleep aid.     Diet:   Drink 8 to 12 glasses of liquids (milk, juice, water) every day.  You may eat meals and snacks.     Activity:  Count fetal kicks everyday (see handout)  Call your doctor or nurse midwife if your baby is moving less than usual.     Call your provider if you notice:  Swelling in your face or increased swelling in your hands or legs.  Headaches that are not relieved by Tylenol (acetaminophen).  Changes in your vision (blurring: seeing spots or stars.)  Nausea (sick to your stomach) and vomiting (throwing up).   Weight gain of 5 pounds or more per week.  Heartburn that doesn't go away.  Signs of bladder infection: pain when you urinate (use the toilet), need to go more often and more urgently.  The bag of darby (rupture of membranes) breaks, or you notice leaking in your underwear.  Bright red blood in your underwear.  Abdominal (lower belly) or stomach pain.  For first baby: Contractions (tightening) less than 5 minutes apart for one hour or more.  Second (plus) baby: Contractions (tightening) less than 10 minutes apart and getting stronger.    Follow-up:  As scheduled in the clinic.

## 2019-05-21 NOTE — PLAN OF CARE
Data: Patient presented to [facility]place: 2019  1:38 AM.  Reason for maternal/fetal assessment is uterine contractions. Patient reports cramping starting last evening between [id number] and reports that cramping had been getting stronger.  Patient is a .  Prenatal record reviewed. Pregnancy  has been complicated by has been uncomplicated.  Gestational Age 38w3d. VSS. Fetal movement present. Patient denies leaking of vaginal fluid/rupture of membranes, vaginal bleeding, abdominal pain, nausea, vomiting, headache, visual disturbances, epigastric or URQ pain, significant edema. Support person is not present.   Action: Verbal consent for EFM. Triage assessment completed. Bill of rights reviewed.  Response: Patient verbalized agreement with plan. Will contact Dr David Gruber with update and further orders.

## 2019-05-23 ENCOUNTER — PRENATAL OFFICE VISIT (OUTPATIENT)
Dept: OBGYN | Facility: CLINIC | Age: 24
End: 2019-05-23
Payer: COMMERCIAL

## 2019-05-23 VITALS — BODY MASS INDEX: 33.84 KG/M2 | WEIGHT: 185 LBS | SYSTOLIC BLOOD PRESSURE: 116 MMHG | DIASTOLIC BLOOD PRESSURE: 78 MMHG

## 2019-05-23 DIAGNOSIS — Z34.83 ENCOUNTER FOR SUPERVISION OF OTHER NORMAL PREGNANCY IN THIRD TRIMESTER: Primary | ICD-10-CM

## 2019-05-23 PROCEDURE — 99207 ZZC PRENATAL VISIT: CPT | Performed by: OBSTETRICS & GYNECOLOGY

## 2019-05-23 NOTE — NURSING NOTE
"Chief Complaint   Patient presents with     Prenatal Care       Initial /78   Wt 83.9 kg (185 lb)   LMP 2018 (Exact Date)   Breastfeeding? No   BMI 33.84 kg/m   Estimated body mass index is 33.84 kg/m  as calculated from the following:    Height as of 19: 1.575 m (5' 2\").    Weight as of this encounter: 83.9 kg (185 lb).  BP completed using cuff size: regular    Questioned patient about current smoking habits.  Pt. has never smoked.          38w5d  + FM daily  + contractions  + pelvic pressure  Pauline Maier LPN                "

## 2019-05-23 NOTE — PROGRESS NOTES
PROBLEM LIST  LABS: Opos/RI  GIRL    1. Multip.    Doing well, a few contractions. Was in L&D but no labor. Follow up in 1 week.    Kristie Mathis MD

## 2019-05-27 ENCOUNTER — NURSE TRIAGE (OUTPATIENT)
Dept: NURSING | Facility: CLINIC | Age: 24
End: 2019-05-27

## 2019-05-27 ENCOUNTER — HOSPITAL ENCOUNTER (OUTPATIENT)
Facility: CLINIC | Age: 24
Discharge: HOME | End: 2019-05-27
Attending: OBSTETRICS & GYNECOLOGY | Admitting: OBSTETRICS & GYNECOLOGY
Payer: COMMERCIAL

## 2019-05-27 PROBLEM — Z36.89 ENCOUNTER FOR TRIAGE IN PREGNANT PATIENT: Status: ACTIVE | Noted: 2019-05-27

## 2019-05-27 LAB
ALBUMIN UR-MCNC: 30 MG/DL
APPEARANCE UR: ABNORMAL
BACTERIA #/AREA URNS HPF: ABNORMAL /HPF
BILIRUB UR QL STRIP: NEGATIVE
COLOR UR AUTO: YELLOW
FERN CRYSTALLIZATION: NORMAL
GLUCOSE UR STRIP-MCNC: NEGATIVE MG/DL
HGB UR QL STRIP: NEGATIVE
KETONES UR STRIP-MCNC: ABNORMAL MG/DL
LEUKOCYTE ESTERASE UR QL STRIP: ABNORMAL
MUCOUS THREADS #/AREA URNS LPF: PRESENT /LPF
NITRATE UR QL: NEGATIVE
PH UR STRIP: 6 PH (ref 5–7)
RBC #/AREA URNS AUTO: 3 /HPF (ref 0–2)
RUPTURE OF FETAL MEMBRANES BY ROM PLUS: NEGATIVE
SOURCE: ABNORMAL
SP GR UR STRIP: 1.03 (ref 1–1.03)
SQUAMOUS #/AREA URNS AUTO: 2 /HPF (ref 0–1)
UROBILINOGEN UR STRIP-MCNC: NORMAL MG/DL (ref 0–2)
WBC #/AREA URNS AUTO: 7 /HPF (ref 0–5)

## 2019-05-27 PROCEDURE — G0463 HOSPITAL OUTPT CLINIC VISIT: HCPCS | Mod: 25

## 2019-05-27 PROCEDURE — G0463 HOSPITAL OUTPT CLINIC VISIT: HCPCS

## 2019-05-27 PROCEDURE — 40000809 ZZH STATISTIC NO DOCUMENTATION TO SUPPORT CHARGE

## 2019-05-27 PROCEDURE — 59025 FETAL NON-STRESS TEST: CPT

## 2019-05-27 PROCEDURE — 84112 EVAL AMNIOTIC FLUID PROTEIN: CPT | Performed by: OBSTETRICS & GYNECOLOGY

## 2019-05-27 PROCEDURE — 81001 URINALYSIS AUTO W/SCOPE: CPT | Performed by: OBSTETRICS & GYNECOLOGY

## 2019-05-27 NOTE — TELEPHONE ENCOUNTER
"Patient calls with .  Patient reports light pain in lower back and belly which started yesterday afternoon.  Patient says her bed was wet when she woke up but didn't have any contractions.     Patient is 39 weeks and 2 days, USMAN: June 1st.  Reviewed guideline and care advice with caller to be seen in L&D.  Caller verbalizes understanding and will have her friend come get her.  FNA called and informed [facility] charge nurse patient will be coming in.      Reason for Disposition    Leakage of fluid from vagina    Additional Information    Negative: [1] SEVERE abdominal pain (e.g., excruciating) AND [2] constant AND [3] present > 1 hour    Negative: Severe bleeding (e.g., continuous red blood from vagina, or large blood clots)    Negative: Umbilical cord hanging out of the vagina (shiny, white, curled appearance, \"like telephone cord\")    Negative: Uncontrollable urge to push (i.e., feels like baby is coming out now)    Negative: Can see baby    Negative: Sounds like a life-threatening emergency to the triager    Negative: < 20 weeks pregnant    Negative: Vaginal bleeding    Protocols used: PREGNANCY - RUPTURE OF [id number]      "

## 2019-05-27 NOTE — PLAN OF CARE
Data: Patient presented to Birthplace: 2019  4:49 PM.  Reason for maternal/fetal assessment is leaking vaginal fluid, pelvic pressure and lower back discomfort. Patient reports increase in fluid since  morning accompanied with pressure.  Patient is a .  Prenatal record reviewed. Pregnancy has been uncomplicated..  Gestational Age 39w2d. VSS. Fetal movement present. Patient denies vaginal bleeding, abdominal pain, nausea, vomiting, headache, visual disturbances, epigastric or URQ pain, significant edema. Support person is present-friend who is also able to interpret until we have a more definite plan for discharge vs. admission  Action: Verbal consent for EFM. Triage assessment completed. Bill of rights reviewed.  Response: Patient verbalized agreement with plan. Will contact Dr Marcus Evans with update and further orders.

## 2019-05-27 NOTE — TELEPHONE ENCOUNTER
" calling with patient, \"I recently spoke with a nurse and the nurse advised me to go to the hospital.\" Patient stating she has not been able to find someone to watch the kids. Patient would like to know what she should do? Reporting pain in back and abdominal area is increasing. Reviewed with patient she needs to be seen in labor and delivery as recommended. Reviewed option to call 911 if she is unable to transport by car. Patient agrees to call a friend and go to labor and delivery as advised. See triage notes from 5/27/19 at 855 a.m..    Jennifer Samuels RN  [facility]      "

## 2019-05-28 NOTE — PROVIDER NOTIFICATION
05/27/19 1900   Provider Notification   Provider Name/Title Dr. Evans   Method of Notification Phone   Request Evaluate - Remote   Notification Reason Lab/Diagnostic Study     MD notified of negative ROM+ and negative FERN. Urine sent for culture. Received order to discharge patient. Follow up with OB on Thursday. Reviewed labor precautions. All questions and concerns answered with family interpreting.

## 2019-05-30 ENCOUNTER — PRENATAL OFFICE VISIT (OUTPATIENT)
Dept: OBGYN | Facility: CLINIC | Age: 24
End: 2019-05-30
Payer: COMMERCIAL

## 2019-05-30 VITALS — BODY MASS INDEX: 33.82 KG/M2 | WEIGHT: 184.9 LBS | DIASTOLIC BLOOD PRESSURE: 76 MMHG | SYSTOLIC BLOOD PRESSURE: 114 MMHG

## 2019-05-30 DIAGNOSIS — Z34.83 ENCOUNTER FOR SUPERVISION OF OTHER NORMAL PREGNANCY IN THIRD TRIMESTER: Primary | ICD-10-CM

## 2019-05-30 PROCEDURE — 99207 ZZC PRENATAL VISIT: CPT | Performed by: OBSTETRICS & GYNECOLOGY

## 2019-05-30 PROCEDURE — 59426 ANTEPARTUM CARE ONLY: CPT | Performed by: OBSTETRICS & GYNECOLOGY

## 2019-05-30 NOTE — NURSING NOTE
"Chief Complaint   Patient presents with     Prenatal Care     39 weeks 5 days, no c/o vaginal bleeding. Patient has been having contractions and she was evaluated n L&D for ROM, since then patient has not had any leaking. +FM daily       Initial /76 (BP Location: Right arm, Patient Position: Chair, Cuff Size: Adult Regular)   Wt 83.9 kg (184 lb 14.4 oz)   LMP 2018 (Exact Date)   BMI 33.82 kg/m   Estimated body mass index is 33.82 kg/m  as calculated from the following:    Height as of 19: 1.575 m (5' 2\").    Weight as of this encounter: 83.9 kg (184 lb 14.4 oz).  BP completed using cuff size: regular    Questioned patient about current smoking habits.  Pt. has never smoked.          The following HM Due: NONE      Mk Barnes CMA               "

## 2019-05-30 NOTE — PROGRESS NOTES
PROBLEM LIST  LABS: Opos/RI  GIRL    1. Multip.    Doing well, a few contractions. Discussed potential IOL after the weekend if not delivered. She will call Monday or Tuesday if needed.    Kristie Mathis MD

## 2019-05-31 ENCOUNTER — ANESTHESIA (OUTPATIENT)
Dept: OBGYN | Facility: CLINIC | Age: 24
End: 2019-05-31
Payer: COMMERCIAL

## 2019-05-31 ENCOUNTER — NURSE TRIAGE (OUTPATIENT)
Dept: NURSING | Facility: CLINIC | Age: 24
End: 2019-05-31

## 2019-05-31 ENCOUNTER — ANESTHESIA EVENT (OUTPATIENT)
Dept: OBGYN | Facility: CLINIC | Age: 24
End: 2019-05-31
Payer: COMMERCIAL

## 2019-05-31 ENCOUNTER — HOSPITAL ENCOUNTER (INPATIENT)
Facility: CLINIC | Age: 24
LOS: 3 days | Discharge: HOME HEALTH | End: 2019-06-03
Attending: OBSTETRICS & GYNECOLOGY | Admitting: OBSTETRICS & GYNECOLOGY
Payer: COMMERCIAL

## 2019-05-31 DIAGNOSIS — R52 POSTPARTUM PAIN: Primary | ICD-10-CM

## 2019-05-31 LAB
ABO + RH BLD: NORMAL
ABO + RH BLD: NORMAL
ALBUMIN UR-MCNC: 30 MG/DL
APPEARANCE UR: CLEAR
BILIRUB UR QL STRIP: NEGATIVE
COLOR UR AUTO: YELLOW
GLUCOSE UR STRIP-MCNC: NEGATIVE MG/DL
HGB UR QL STRIP: NEGATIVE
KETONES UR STRIP-MCNC: 10 MG/DL
LEUKOCYTE ESTERASE UR QL STRIP: NEGATIVE
MUCOUS THREADS #/AREA URNS LPF: PRESENT /LPF
NITRATE UR QL: NEGATIVE
PH UR STRIP: 6 PH (ref 5–7)
RBC #/AREA URNS AUTO: <1 /HPF (ref 0–2)
RUPTURE OF FETAL MEMBRANES BY ROM PLUS: NEGATIVE
SOURCE: ABNORMAL
SP GR UR STRIP: 1.03 (ref 1–1.03)
SPECIMEN EXP DATE BLD: NORMAL
SQUAMOUS #/AREA URNS AUTO: <1 /HPF (ref 0–1)
UROBILINOGEN UR STRIP-MCNC: NORMAL MG/DL (ref 0–2)
WBC #/AREA URNS AUTO: 1 /HPF (ref 0–5)

## 2019-05-31 PROCEDURE — G0463 HOSPITAL OUTPT CLINIC VISIT: HCPCS

## 2019-05-31 PROCEDURE — 86780 TREPONEMA PALLIDUM: CPT | Performed by: OBSTETRICS & GYNECOLOGY

## 2019-05-31 PROCEDURE — 36415 COLL VENOUS BLD VENIPUNCTURE: CPT | Performed by: OBSTETRICS & GYNECOLOGY

## 2019-05-31 PROCEDURE — 25000128 H RX IP 250 OP 636: Performed by: OBSTETRICS & GYNECOLOGY

## 2019-05-31 PROCEDURE — 86901 BLOOD TYPING SEROLOGIC RH(D): CPT | Performed by: OBSTETRICS & GYNECOLOGY

## 2019-05-31 PROCEDURE — 81001 URINALYSIS AUTO W/SCOPE: CPT | Performed by: OBSTETRICS & GYNECOLOGY

## 2019-05-31 PROCEDURE — 25800030 ZZH RX IP 258 OP 636: Performed by: OBSTETRICS & GYNECOLOGY

## 2019-05-31 PROCEDURE — 84112 EVAL AMNIOTIC FLUID PROTEIN: CPT | Performed by: OBSTETRICS & GYNECOLOGY

## 2019-05-31 PROCEDURE — 40000671 ZZH STATISTIC ANESTHESIA CASE

## 2019-05-31 PROCEDURE — 86900 BLOOD TYPING SEROLOGIC ABO: CPT | Performed by: OBSTETRICS & GYNECOLOGY

## 2019-05-31 PROCEDURE — 12000000 ZZH R&B MED SURG/OB

## 2019-05-31 PROCEDURE — 00HU33Z INSERTION OF INFUSION DEVICE INTO SPINAL CANAL, PERCUTANEOUS APPROACH: ICD-10-PCS | Performed by: ANESTHESIOLOGY

## 2019-05-31 PROCEDURE — 37000011 ZZH ANESTHESIA WARD SERVICE

## 2019-05-31 PROCEDURE — 3E0R3BZ INTRODUCTION OF ANESTHETIC AGENT INTO SPINAL CANAL, PERCUTANEOUS APPROACH: ICD-10-PCS | Performed by: ANESTHESIOLOGY

## 2019-05-31 RX ORDER — FENTANYL CITRATE 50 UG/ML
50-100 INJECTION, SOLUTION INTRAMUSCULAR; INTRAVENOUS
Status: DISCONTINUED | OUTPATIENT
Start: 2019-05-31 | End: 2019-06-01

## 2019-05-31 RX ORDER — ONDANSETRON 2 MG/ML
4 INJECTION INTRAMUSCULAR; INTRAVENOUS EVERY 6 HOURS PRN
Status: DISCONTINUED | OUTPATIENT
Start: 2019-05-31 | End: 2019-06-01

## 2019-05-31 RX ORDER — ONDANSETRON 4 MG/1
4 TABLET, ORALLY DISINTEGRATING ORAL EVERY 6 HOURS PRN
Status: DISCONTINUED | OUTPATIENT
Start: 2019-05-31 | End: 2019-06-03 | Stop reason: HOSPADM

## 2019-05-31 RX ORDER — IBUPROFEN 800 MG/1
800 TABLET, FILM COATED ORAL
Status: COMPLETED | OUTPATIENT
Start: 2019-05-31 | End: 2019-06-01

## 2019-05-31 RX ORDER — EPHEDRINE SULFATE 50 MG/ML
5 INJECTION, SOLUTION INTRAMUSCULAR; INTRAVENOUS; SUBCUTANEOUS
Status: DISCONTINUED | OUTPATIENT
Start: 2019-05-31 | End: 2019-06-03 | Stop reason: HOSPADM

## 2019-05-31 RX ORDER — METHYLERGONOVINE MALEATE 0.2 MG/ML
200 INJECTION INTRAVENOUS
Status: DISCONTINUED | OUTPATIENT
Start: 2019-05-31 | End: 2019-06-01

## 2019-05-31 RX ORDER — NALOXONE HYDROCHLORIDE 0.4 MG/ML
.1-.4 INJECTION, SOLUTION INTRAMUSCULAR; INTRAVENOUS; SUBCUTANEOUS
Status: DISCONTINUED | OUTPATIENT
Start: 2019-05-31 | End: 2019-06-03 | Stop reason: HOSPADM

## 2019-05-31 RX ORDER — BUPIVACAINE HCL/0.9 % NACL/PF 0.125 %
PLASTIC BAG, INJECTION (ML) EPIDURAL CONTINUOUS
Status: DISCONTINUED | OUTPATIENT
Start: 2019-05-31 | End: 2019-06-03 | Stop reason: HOSPADM

## 2019-05-31 RX ORDER — OXYTOCIN 10 [USP'U]/ML
10 INJECTION, SOLUTION INTRAMUSCULAR; INTRAVENOUS
Status: DISCONTINUED | OUTPATIENT
Start: 2019-05-31 | End: 2019-06-01

## 2019-05-31 RX ORDER — OXYTOCIN/0.9 % SODIUM CHLORIDE 30/500 ML
100-340 PLASTIC BAG, INJECTION (ML) INTRAVENOUS CONTINUOUS PRN
Status: COMPLETED | OUTPATIENT
Start: 2019-05-31 | End: 2019-06-01

## 2019-05-31 RX ORDER — CARBOPROST TROMETHAMINE 250 UG/ML
250 INJECTION, SOLUTION INTRAMUSCULAR
Status: DISCONTINUED | OUTPATIENT
Start: 2019-05-31 | End: 2019-06-01

## 2019-05-31 RX ORDER — NALOXONE HYDROCHLORIDE 0.4 MG/ML
.1-.4 INJECTION, SOLUTION INTRAMUSCULAR; INTRAVENOUS; SUBCUTANEOUS
Status: DISCONTINUED | OUTPATIENT
Start: 2019-05-31 | End: 2019-06-01

## 2019-05-31 RX ORDER — SODIUM CHLORIDE, SODIUM LACTATE, POTASSIUM CHLORIDE, CALCIUM CHLORIDE 600; 310; 30; 20 MG/100ML; MG/100ML; MG/100ML; MG/100ML
INJECTION, SOLUTION INTRAVENOUS CONTINUOUS
Status: DISCONTINUED | OUTPATIENT
Start: 2019-05-31 | End: 2019-06-01

## 2019-05-31 RX ORDER — ACETAMINOPHEN 325 MG/1
650 TABLET ORAL EVERY 4 HOURS PRN
Status: DISCONTINUED | OUTPATIENT
Start: 2019-05-31 | End: 2019-06-01

## 2019-05-31 RX ORDER — EPHEDRINE SULFATE 50 MG/ML
INJECTION, SOLUTION INTRAMUSCULAR; INTRAVENOUS; SUBCUTANEOUS
Status: DISCONTINUED
Start: 2019-05-31 | End: 2019-06-01 | Stop reason: HOSPADM

## 2019-05-31 RX ORDER — BUPIVACAINE HCL/0.9 % NACL/PF 0.125 %
PLASTIC BAG, INJECTION (ML) EPIDURAL
Status: DISCONTINUED
Start: 2019-05-31 | End: 2019-06-01 | Stop reason: HOSPADM

## 2019-05-31 RX ORDER — ONDANSETRON 2 MG/ML
4 INJECTION INTRAMUSCULAR; INTRAVENOUS EVERY 6 HOURS PRN
Status: DISCONTINUED | OUTPATIENT
Start: 2019-05-31 | End: 2019-06-03 | Stop reason: HOSPADM

## 2019-05-31 RX ORDER — NALBUPHINE HYDROCHLORIDE 10 MG/ML
2.5-5 INJECTION, SOLUTION INTRAMUSCULAR; INTRAVENOUS; SUBCUTANEOUS EVERY 6 HOURS PRN
Status: DISCONTINUED | OUTPATIENT
Start: 2019-05-31 | End: 2019-06-03 | Stop reason: HOSPADM

## 2019-05-31 RX ORDER — OXYCODONE AND ACETAMINOPHEN 5; 325 MG/1; MG/1
1 TABLET ORAL
Status: DISCONTINUED | OUTPATIENT
Start: 2019-05-31 | End: 2019-06-01

## 2019-05-31 RX ADMIN — FENTANYL CITRATE 100 MCG: 50 INJECTION INTRAMUSCULAR; INTRAVENOUS at 22:27

## 2019-05-31 RX ADMIN — SODIUM CHLORIDE, POTASSIUM CHLORIDE, SODIUM LACTATE AND CALCIUM CHLORIDE 1000 ML: 600; 310; 30; 20 INJECTION, SOLUTION INTRAVENOUS at 21:50

## 2019-05-31 NOTE — TELEPHONE ENCOUNTER
"Patient calls stating she is pregnant- 39 weeks 6 days.  EDC 6/1/2019. Pregnancy # 3 by report.   (Catie) per [facility] assists with call.  Patient states has moderate to severe \"constant low abdominal pain since yesterday.\"  Describes as \"7\" on a 1-10 pain scale.    Currently states has a headache since this morning.  Denies leaking fluid.  Denies vaginal bleeding.  States baby moving as usual.  Plans to deliver at [facility]. Dr. Mathis is OB/Gyn by report.    Protocol-  Pregnancy- Labor- Adult  Care advice reviewed.   Disposition-  Go to L&D now.   Caller states understanding of the recommended disposition.   Patient states she will call for a ride now or call 911.  This RN called [facility] L&D and advised (Geena) RN of Patient on her way.  Advised to call back if further questions or concerns.     ANIBAL FallN RN  [facility]     Reason for Disposition    MODERATE-SEVERE abdominal pain    Additional Information    Negative: Passed out (i.e., fainted, collapsed and was not responding)    Negative: Shock suspected (e.g., cold/pale/clammy skin, too weak to stand, low BP, rapid pulse)    Negative: Difficult to awaken or acting confused (e.g., disoriented, slurred speech)    Negative: SEVERE constant abdominal pain (e.g., excruciating) and present > 1 hour    Negative: SEVERE bleeding (e.g., continuous red blood from vagina, or large blood clots)    Negative: Umbilical cord hanging out of the vagina (shiny, white, curled appearance, \"like telephone cord\")    Negative: Uncontrollable urge to push (i.e., feels like baby is coming out now)    Negative: Can see baby    Negative: Sounds like a life-threatening emergency to the triager    Protocols used: PREGNANCY - LABOR-A-OH    "

## 2019-05-31 NOTE — TELEPHONE ENCOUNTER
3 minutes apart, they are supposed to induce her.  Is this normal? Her pain in constant for > 1 hour at an 8 or 9 for pain level.  he wasn't going to call 911, she was going to have someone drive her instead. I explained she should call 911, this is serious.  She then said she will do that.  She doesn't know what she will do with her two children.  I paged ROBERTO Wall, 911 disposition.  used.  Colette Mcrae RN-[facility]      Reason for Disposition    [1] SEVERE abdominal pain (e.g., excruciating) AND [2] constant AND [3] present > 1 hour     Pain is constant > 1 hour, rated at 9    Additional Information    Negative: Passed out (i.e., lost consciousness, collapsed and was not responding)    Negative: Shock suspected (e.g., cold/pale/clammy skin, too weak to stand, low BP, rapid pulse)    Negative: Difficult to awaken or acting confused (e.g., disoriented, slurred speech)    Protocols used: PREGNANCY - ABDOMINAL PAIN GREATER THAN 20 WEEKS EGA-A-

## 2019-05-31 NOTE — TELEPHONE ENCOUNTER
" used or this call:    24 y/o female who is 39w 6d, seen yesterday by OB/GYN had membranes stripped, now today since early morning, mild pain with constant pressure, pressure in lower abdomen some in low back, no rupture of membranes and no vaginal bleeding, but but she can't sleep, baby is low, pressure in the front.  She can not say how long it lasts or how frequent, not sure if it is a contraction or not, RN discussed false labor and what true labor feels like.     At this time she decided that pressure is too much, she will wait until her  is home and have him take her to L&D in the hospital, RN requested she call back so we can notify the L&D department she is coming.    Josselyn Hurtado RN - [place] [facility]  5/31/2019   1:21AM    Reason for Disposition    MODERATE-SEVERE abdominal pain    Protocols used: PREGNANCY - LABOR-A-AH      Additional Information    Negative: Passed out (i.e., lost consciousness, collapsed and was not responding)    Negative: Shock suspected (e.g., cold/pale/clammy skin, too weak to stand, low BP, rapid pulse)    Negative: Difficult to awaken or acting confused (e.g., disoriented, slurred speech)    Negative: [1] SEVERE abdominal pain (e.g., excruciating) AND [2] constant AND [3] present > 1 hour    Negative: Severe bleeding (e.g., continuous red blood from vagina, or large blood clots)    Negative: Umbilical cord hanging out of the vagina (shiny, white, curled appearance, \"like telephone cord\")    Negative: Uncontrollable urge to push (i.e., feels like baby is coming out now)    Negative: Can see baby    Negative: Sounds like a life-threatening emergency to the triager    Negative: [1] First baby (primipara) AND [2] contractions < 6 minutes apart  AND [3] present 2 hours    Negative: [1] History of prior delivery (multipara) AND [2] contractions < 10 minutes apart AND [3] present 1 hour    Negative: [1] History of rapid prior delivery AND [2] contractions < 10 " minutes apart    Negative: [1] Leakage of fluid from vagina AND [2] green or brown in color    Negative: [1] Leakage of fluid from vagina AND [2] leakage started > 4 hours ago    Negative: Vaginal bleeding or spotting    Negative: Baby moving less today (e.g., kick count < 5 in 1 hour or < 10 in 2 hours)    Negative: Severe headache or headache that won't go away    Negative: New blurred vision or vision changes    Protocols used: PREGNANCY - LABOR-A-

## 2019-05-31 NOTE — TELEPHONE ENCOUNTER
Reason for Disposition    MODERATE-SEVERE abdominal pain    Protocols used: PREGNANCY - LABOR-A-AH

## 2019-06-01 LAB — T PALLIDUM AB SER QL: NONREACTIVE

## 2019-06-01 PROCEDURE — 27110038 ZZH RX 271: Performed by: ANESTHESIOLOGY

## 2019-06-01 PROCEDURE — 25000125 ZZHC RX 250: Performed by: OBSTETRICS & GYNECOLOGY

## 2019-06-01 PROCEDURE — 12000000 ZZH R&B MED SURG/OB

## 2019-06-01 PROCEDURE — 25000128 H RX IP 250 OP 636: Performed by: OBSTETRICS & GYNECOLOGY

## 2019-06-01 PROCEDURE — 25000128 H RX IP 250 OP 636: Performed by: ANESTHESIOLOGY

## 2019-06-01 PROCEDURE — 25800030 ZZH RX IP 258 OP 636: Performed by: ANESTHESIOLOGY

## 2019-06-01 PROCEDURE — 25800030 ZZH RX IP 258 OP 636: Performed by: OBSTETRICS & GYNECOLOGY

## 2019-06-01 PROCEDURE — 59410 OBSTETRICAL CARE: CPT | Performed by: OBSTETRICS & GYNECOLOGY

## 2019-06-01 PROCEDURE — 25000132 ZZH RX MED GY IP 250 OP 250 PS 637: Performed by: OBSTETRICS & GYNECOLOGY

## 2019-06-01 PROCEDURE — 10907ZC DRAINAGE OF AMNIOTIC FLUID, THERAPEUTIC FROM PRODUCTS OF CONCEPTION, VIA NATURAL OR ARTIFICIAL OPENING: ICD-10-PCS | Performed by: OBSTETRICS & GYNECOLOGY

## 2019-06-01 PROCEDURE — 25000128 H RX IP 250 OP 636

## 2019-06-01 PROCEDURE — 72200001 ZZH LABOR CARE VAGINAL DELIVERY SINGLE

## 2019-06-01 RX ORDER — OXYCODONE HYDROCHLORIDE 5 MG/1
5 TABLET ORAL EVERY 4 HOURS PRN
Status: DISCONTINUED | OUTPATIENT
Start: 2019-06-01 | End: 2019-06-03 | Stop reason: HOSPADM

## 2019-06-01 RX ORDER — AMOXICILLIN 250 MG
1 CAPSULE ORAL 2 TIMES DAILY
Status: DISCONTINUED | OUTPATIENT
Start: 2019-06-01 | End: 2019-06-03 | Stop reason: HOSPADM

## 2019-06-01 RX ORDER — IBUPROFEN 800 MG/1
800 TABLET, FILM COATED ORAL EVERY 6 HOURS PRN
Status: DISCONTINUED | OUTPATIENT
Start: 2019-06-01 | End: 2019-06-03 | Stop reason: HOSPADM

## 2019-06-01 RX ORDER — LANOLIN 100 %
OINTMENT (GRAM) TOPICAL
Status: DISCONTINUED | OUTPATIENT
Start: 2019-06-01 | End: 2019-06-03 | Stop reason: HOSPADM

## 2019-06-01 RX ORDER — ACETAMINOPHEN 325 MG/1
650 TABLET ORAL EVERY 4 HOURS PRN
Status: DISCONTINUED | OUTPATIENT
Start: 2019-06-01 | End: 2019-06-03 | Stop reason: HOSPADM

## 2019-06-01 RX ORDER — AMOXICILLIN 250 MG
2 CAPSULE ORAL 2 TIMES DAILY
Status: DISCONTINUED | OUTPATIENT
Start: 2019-06-01 | End: 2019-06-03 | Stop reason: HOSPADM

## 2019-06-01 RX ORDER — OXYTOCIN/0.9 % SODIUM CHLORIDE 30/500 ML
100 PLASTIC BAG, INJECTION (ML) INTRAVENOUS CONTINUOUS
Status: DISCONTINUED | OUTPATIENT
Start: 2019-06-01 | End: 2019-06-03 | Stop reason: HOSPADM

## 2019-06-01 RX ORDER — HYDROCORTISONE 2.5 %
CREAM (GRAM) TOPICAL 3 TIMES DAILY PRN
Status: DISCONTINUED | OUTPATIENT
Start: 2019-06-01 | End: 2019-06-03 | Stop reason: HOSPADM

## 2019-06-01 RX ORDER — HYDROMORPHONE HYDROCHLORIDE 1 MG/ML
INJECTION, SOLUTION INTRAMUSCULAR; INTRAVENOUS; SUBCUTANEOUS
Status: COMPLETED
Start: 2019-06-01 | End: 2019-06-01

## 2019-06-01 RX ORDER — NALOXONE HYDROCHLORIDE 0.4 MG/ML
.1-.4 INJECTION, SOLUTION INTRAMUSCULAR; INTRAVENOUS; SUBCUTANEOUS
Status: DISCONTINUED | OUTPATIENT
Start: 2019-06-01 | End: 2019-06-03 | Stop reason: HOSPADM

## 2019-06-01 RX ORDER — OXYTOCIN 10 [USP'U]/ML
10 INJECTION, SOLUTION INTRAMUSCULAR; INTRAVENOUS
Status: DISCONTINUED | OUTPATIENT
Start: 2019-06-01 | End: 2019-06-03 | Stop reason: HOSPADM

## 2019-06-01 RX ORDER — OXYTOCIN/0.9 % SODIUM CHLORIDE 30/500 ML
340 PLASTIC BAG, INJECTION (ML) INTRAVENOUS CONTINUOUS PRN
Status: DISCONTINUED | OUTPATIENT
Start: 2019-06-01 | End: 2019-06-03 | Stop reason: HOSPADM

## 2019-06-01 RX ORDER — BISACODYL 10 MG
10 SUPPOSITORY, RECTAL RECTAL DAILY PRN
Status: DISCONTINUED | OUTPATIENT
Start: 2019-06-03 | End: 2019-06-03 | Stop reason: HOSPADM

## 2019-06-01 RX ADMIN — SODIUM CHLORIDE, POTASSIUM CHLORIDE, SODIUM LACTATE AND CALCIUM CHLORIDE: 600; 310; 30; 20 INJECTION, SOLUTION INTRAVENOUS at 00:34

## 2019-06-01 RX ADMIN — OXYTOCIN-SODIUM CHLORIDE 0.9% IV SOLN 30 UNIT/500ML 340 ML/HR: 30-0.9/5 SOLUTION at 05:56

## 2019-06-01 RX ADMIN — NALBUPHINE HYDROCHLORIDE 2.5 MG: 10 INJECTION, SOLUTION INTRAMUSCULAR; INTRAVENOUS; SUBCUTANEOUS at 08:43

## 2019-06-01 RX ADMIN — IBUPROFEN 800 MG: 800 TABLET ORAL at 23:11

## 2019-06-01 RX ADMIN — ACETAMINOPHEN 650 MG: 325 TABLET, FILM COATED ORAL at 12:33

## 2019-06-01 RX ADMIN — HYDROMORPHONE HYDROCHLORIDE 0.5 MG: 1 INJECTION, SOLUTION INTRAMUSCULAR; INTRAVENOUS; SUBCUTANEOUS at 02:19

## 2019-06-01 RX ADMIN — IBUPROFEN 800 MG: 800 TABLET ORAL at 15:18

## 2019-06-01 RX ADMIN — ONDANSETRON 4 MG: 2 INJECTION INTRAMUSCULAR; INTRAVENOUS at 03:13

## 2019-06-01 RX ADMIN — FENTANYL CITRATE 100 MCG: 50 INJECTION INTRAMUSCULAR; INTRAVENOUS at 01:09

## 2019-06-01 RX ADMIN — IBUPROFEN 800 MG: 800 TABLET ORAL at 07:11

## 2019-06-01 RX ADMIN — NALBUPHINE HYDROCHLORIDE 2.5 MG: 10 INJECTION, SOLUTION INTRAMUSCULAR; INTRAVENOUS; SUBCUTANEOUS at 07:56

## 2019-06-01 RX ADMIN — SENNOSIDES AND DOCUSATE SODIUM 2 TABLET: 8.6; 5 TABLET ORAL at 23:11

## 2019-06-01 RX ADMIN — SODIUM CHLORIDE, POTASSIUM CHLORIDE, SODIUM LACTATE AND CALCIUM CHLORIDE: 600; 310; 30; 20 INJECTION, SOLUTION INTRAVENOUS at 04:00

## 2019-06-01 RX ADMIN — Medication: at 02:20

## 2019-06-01 NOTE — PROVIDER NOTIFICATION
05/31/19 2150   Provider Notification   Provider Name/Title Dr. Rico   Method of Notification Phone   Request Evaluate in Person   Notification Reason Patient Arrived;Status Update   Dr. Rico notified of pt arrival, gestational age, G&P, pt complaint, pt with rapid cervical dilation from closed to 3-4cm, pt complaining of increasing pain and pressure.  GBS -, FHR Cat. I although fetus very active.  MD verbalized understanding, TORB for intrapartum orders, stated he would come in to hospital to evaluate.

## 2019-06-01 NOTE — PLAN OF CARE
Data: Katelyn Hollignsworth transferred to [facility] via cart at 1230. Baby transferred via crib.  Action: Receiving unit notified of transfer: Yes. Patient and family notified of room change. Report given to Pina SMITH at 1230. Belongings sent to receiving unit. Accompanied by Registered Nurse. Oriented patient to surroundings. Call light within reach. ID bands double-checked with receiving RN.  Response: Patient tolerated transfer and is stable.    Patients mobililty level scored using the bedside mobility assistance tool (BMAT). Patient is at a mobility level test number: 1. Mobility equipment used: cart. Required assist of 1 staff members. Further use of BMAT scoring required.

## 2019-06-01 NOTE — PLAN OF CARE
Data: [facility]: 2019  8:49 PM.  Reason for maternal/fetal assessment is pelvic pain. Patient reports having intermittent pelvic pain.  Patient reports minimal UC's, but gets sharp pelvic pain that is constant for 1-2 hours and then goes away.  As admission progressed, pt began to experience UC's and increased pelvic pain and pressure.  Patient is a .  Prenatal record reviewed. Pregnancy has been uncomplicated..  Gestational Age 39w6d. VSS. Fetal movement present. Patient denies vaginal bleeding, abdominal pain, nausea, vomiting, headache, visual disturbances, epigastric or URQ pain, significant edema. Support person is not present.   Action: Verbal consent for EFM. Triage assessment completed. Bill of rights reviewed. Admission completed with , first phone, then in person.  Response: Patient verbalized agreement with plan. Will contact Dr Yifan Rico with update and further orders.

## 2019-06-01 NOTE — ANESTHESIA PREPROCEDURE EVALUATION
Anesthesia Pre-Procedure Evaluation    Patient: Katelyn Hollingsworth   MRN: [id number] : 1995          Preoperative Diagnosis: * No pre-op diagnosis entered *    * No procedures listed *    Past Medical History:   Diagnosis Date     NO ACTIVE PROBLEMS      Supervision of normal pregnancy 2018     Past Surgical History:   Procedure Laterality Date     NO HISTORY OF SURGERY       Anesthesia Evaluation       history and physical reviewed .             ROS/MED HX    ENT/Pulmonary:  - neg pulmonary ROS     Neurologic:  - neg neurologic ROS     Cardiovascular:  - neg cardiovascular ROS       METS/Exercise Tolerance:     Hematologic:         Musculoskeletal:         GI/Hepatic:  - neg GI/hepatic ROS       Renal/Genitourinary:         Endo:         Psychiatric:         Infectious Disease:         Malignancy:         Other:                     neg OB ROS            Physical Exam      Airway     Dental     Cardiovascular       Pulmonary     Other findings:  at term, in labor, requesting pain  management        Lab Results   Component Value Date    WBC 10.4 2019    HGB 10.4 (L) 2019    HCT 32.2 (L) 2019     2019     10/21/2018    POTASSIUM 3.8 10/21/2018    CHLORIDE 105 10/21/2018    CO2 22 10/21/2018    BUN 7 10/21/2018    CR 0.53 10/21/2018    GLC 99 10/21/2018    SHAHRZAD 8.6 10/21/2018    ALBUMIN 3.9 10/06/2018    PROTTOTAL 7.7 10/06/2018    ALT 21 10/06/2018    AST 20 10/06/2018    ALKPHOS 100 10/06/2018    BILITOTAL 0.4 10/06/2018    LIPASE 133 2018       Preop Vitals  BP Readings from Last 3 Encounters:   19 108/54   19 114/76   19 116/78    Pulse Readings from Last 3 Encounters:   19 89   19 104   18 100      Resp Readings from Last 3 Encounters:   19 18   19 16   19 17    SpO2 Readings from Last 3 Encounters:   19 96%   18 99%   10/22/18 99%      Temp Readings from Last 1 Encounters:   19  "97.7  F (36.5  C) (Oral)    Ht Readings from Last 1 Encounters:   05/16/19 1.575 m (5' 2\")      Wt Readings from Last 1 Encounters:   05/30/19 83.9 kg (184 lb 14.4 oz)    Estimated body mass index is 33.82 kg/m  as calculated from the following:    Height as of 5/16/19: 1.575 m (5' 2\").    Weight as of 5/30/19: 83.9 kg (184 lb 14.4 oz).       Anesthesia Plan      History & Physical Review      ASA Status:  2 .  OB Epidural Asa: 2       Plan for     Discussed risks of epidural catheter placement, including, but not limited to, bruising/bleeding, infection, pain, failure of epidural medications to relieve pain, dural puncture with subsequent headache/ need for epidural blood patch and nerve damage.  All questions answered, understanding voiced and she wishes to proceed.      Postoperative Care      Consents  Anesthetic plan, risks, benefits and alternatives discussed with:  Patient..                 Chaka Craven MD                    .  "

## 2019-06-01 NOTE — L&D DELIVERY NOTE
Katelyn Hollingsworth is a 23 year old-year-old  female,  5 para 2 with EDC 19, who was admitted for spontaneous labor at 40 weeks gestation.    Her prenatal care was at the [facility].  Prenatal course was uncomplicated. Vaginal Group B Streptococcus culture was negative.  Patient underwent artificial rupture of membranes at 2253, yielding clear fluid.  She progressed spontaneously.  Patient received an epidural injection for pain relief.  The patient achieved complete dilation at 0512. She went on to deliver a 7 #, 2 oz female infant at 0545 by . Apgars were  9 at one minute and 9 at five minutes. The fetal oropharynx was bulb suctioned on the perineum.  There was no nuchal cord. The placenta delivered spontaneously and intact at 0548.  The patient had an intact perineum and no vaginal or labial lacerations.   QBL for the delivery was 50cc.   The patient has elected to Breastfeed her infant.  Dr. Andrez Rico

## 2019-06-01 NOTE — PLAN OF CARE
VSS, Bonding well with baby. Pain is well controlled with ibuprofen. Patient is voiding without difficulty and ambulating independently. Tolerating regular diet well. Independent in self and baby cares. Breastfeeding is progressing well.  Pt stated she attempted to breast feed two of her children but was unsuccessful. Education provided to be patient with infant and it takes time for them to LATCH when you first start breastfeeding. Infant latched on well. Encouraged pt to call out for help with breastfeeding as needed. Education and room orientation provided with in person  present.

## 2019-06-01 NOTE — PROGRESS NOTES
Patients mobililty level scored using the bedside mobility assistance tool (BMAT). Patient is at a mobility level test number: 4. Mobility equipment used: none. Required assist of 0 staff members. Further use of BMAT scoring not needed.

## 2019-06-01 NOTE — H&P
"     OB Admit History & Physical  May 31, 2019    Katelyn Hollingsworth  [id number]    History of Present Illness:   Katelyn Hollingsworth  Is a 23 year old female who is  being seen for   Spontaneous, active labor.  Her Estimated Date of Delivery is 2019, and gestational age is 39w6d.  Her last menstrual period was Patient's last menstrual period was 2018 (exact date)..     Pregnancy uncomplicated.    OB History:   Estimated body mass index is 33.82 kg/m  as calculated from the following:    Height as of 19: 1.575 m (5' 2\").    Weight as of 19: 83.9 kg (184 lb 14.4 oz).  OB History    Para Term  AB Living   5 2 2 0 2 2   SAB TAB Ectopic Multiple Live Births   2 0 0 0 2      # Outcome Date GA Lbr James/2nd Weight Sex Delivery Anes PTL Lv   5 Current            4 SAB  5w0d          3 SAB  5w0d          2 Term 12/17/15 40w0d  3.827 kg (8 lb 7 oz) M  EPI N SAMUEL      Name: Seamus   1 Term 10/25/14 40w0d  3.714 kg (8 lb 3 oz) F  EPI N SAMUEL      Name: Gurwinder       Her prenatal course has been  uncomplicated.      Past Medical History:   Past Medical History:   Diagnosis Date     NO ACTIVE PROBLEMS      Supervision of normal pregnancy 2018     Past Surgical History:   Procedure Laterality Date     NO HISTORY OF SURGERY         Medications:   No current outpatient medications on file.       Allergies:   Patient has no known allergies.          Physical Exam:  Vitals:  Last menstrual period 2018, not currently breastfeeding.   FHT rate at 130 bpm ,with contractions every  4-5min.  GEN: Alert Awake uncomfortable with ctx  Abdomen gravid  Cervix and Dilation:  3.5/vtx  Membranes: Intact  Labs:   Hemoglobin   Date Value Ref Range Status   2019 10.4 (L) 11.7 - 15.7 g/dL Final     Comment:     Reviewed: OK with previous   10/21/2018 13.6 11.7 - 15.7 g/dL Final     No results found for: RUBELLAABIGG   Lab Results   Component Value Date    ABO O 2018       "     Lab Results   Component Value Date    RH Pos 11/29/2018      GBS neg    Assessment and Plan:   Intrauterine pregnancy at 39w6d uncomplicated who presents with spontaneous labor.    1. Admit to L&D  2. Monitor progress        Yifan Rico   Dept of OB/GYN  May 31, 2019

## 2019-06-01 NOTE — ANESTHESIA PROCEDURE NOTES
Peripheral nerve/Neuraxial procedure note : epidural catheter  Pre-Procedure  Performed by Chaka Craven MD  Location: floor, OB    Procedure Times:6/1/2019 2:09 AM and 6/1/2019 2:27 AM  Pre-Anesthestic Checklist: patient identified, IV checked, risks and benefits discussed, informed consent, monitors and equipment checked, pre-op evaluation and at physician/surgeon's request    Timeout  Correct Patient: Yes   Correct Procedure: Yes   Correct Site: Yes   Correct Laterality: N/A   Correct Position: Yes   Site Marked: No   .   Procedure Documentation    .    Procedure:    Epidural catheter.  Insertion Site:L3-4  (midline approach) Injection technique: LORT saline   Local skin infiltrated with 3 mL of 1% lidocaine.  YUDITH at 5 cm     Patient Prep;mask, sterile gloves, povidone-iodine 7.5% surgical scrub, patient draped.  .  Needle: Touhy needle Needle Gauge: 17.    Needle Length (Inches) 3.5  # of attempts: 1 and # of redirects:  .   Catheter: 19 G . .  Catheter threaded easily  5 cm epidural space.  10 cm at skin.   .    Assessment/Narrative  Paresthesias: No.  .  .  Aspiration negative for heme or CSF  . Test dose of 5 mL lidocaine 1.5% w/ 1:200,000 epinephrine at 02:19.  Test dose negative for signs of intravascular, subdural or intrathecal injection. Comments:  I or my partner am immediately available. I or my partner will monitor the patient and supervise nursing care at necessary intervals.    Given 10 ml 0.125% bupivicaine infusion with 0.5 mg hydromorphone.

## 2019-06-01 NOTE — PROVIDER NOTIFICATION
05/31/19 7471   Provider Notification   Provider Name/Title Dr. Rico    Method of Notification At Bedside   Dr. Rico at bedside, notified that pt much more comfortable after fentanyl, FHR Cat. I.  Via  MD discussed AROM and POC.  Pt consented, AROM by MD.  Will continue to monitor.

## 2019-06-02 PROCEDURE — 40000085 ZZH STATISTIC IP LACTATION SERVICES 31-45 MIN

## 2019-06-02 PROCEDURE — 25000132 ZZH RX MED GY IP 250 OP 250 PS 637: Performed by: OBSTETRICS & GYNECOLOGY

## 2019-06-02 PROCEDURE — 12000000 ZZH R&B MED SURG/OB

## 2019-06-02 RX ADMIN — SENNOSIDES AND DOCUSATE SODIUM 1 TABLET: 8.6; 5 TABLET ORAL at 22:19

## 2019-06-02 RX ADMIN — ACETAMINOPHEN 650 MG: 325 TABLET, FILM COATED ORAL at 17:36

## 2019-06-02 RX ADMIN — ACETAMINOPHEN 650 MG: 325 TABLET, FILM COATED ORAL at 22:19

## 2019-06-02 RX ADMIN — ACETAMINOPHEN 650 MG: 325 TABLET, FILM COATED ORAL at 01:06

## 2019-06-02 NOTE — PLAN OF CARE
VSS, Bonding well with baby. Declined pain interventions. Patient is voiding without difficulty and ambulating independently. Tolerating regular diet well. Independent in self and baby cares. Formula feeding is going well. Disinterested in breastfeeding in the morning, wanted to use formula. Assisted with breastfeeding at 15:35. Infant latched on well with assistance.

## 2019-06-02 NOTE — PLAN OF CARE
VSS, fundus firm, lochia light -scant, voiding w/o difficulty. Taking Ibuprofen as needed. Independent with self and infant cares. Breastfeeding with  assistance and supplementing infant with formula as needed. Bonding well.

## 2019-06-02 NOTE — PLAN OF CARE
VSS, Bonding well with baby. Declined pain interventions. Patient is voiding without difficulty and ambulating independently. Tolerating regular diet well. Independent in self and baby cares. Formula feeding is going well. Was not interested in breastfeeding this morning. At 1445 decided would like to breastfeed. Encouraged to breastfeed. Lactation available to assist.

## 2019-06-02 NOTE — ANESTHESIA POSTPROCEDURE EVALUATION
Patient: Katelyn Hollingsworth    * No procedures listed *    Diagnosis:* No pre-op diagnosis entered *  Diagnosis Additional Information: IUP, in labor    Anesthesia Type:  Epidural    Note:  Anesthesia Post Evaluation    Patient location during evaluation: Bedside       Comments: I or my partner was immediately available for management of this patient during epidural analgesia infusion.   Patient post labor epidural catheter, doing well.  She reports good pain relief with epidural catheter.  She denies ongoing sensorimotor block, headache, fever, chills or other complaints.  All questions answered, understanding voiced.  She will have us contacted for any questions or problems.        Last vitals:  Vitals:    06/01/19 1520 06/01/19 2311 06/02/19 0749   BP: 106/61 114/55 (P) 102/66   Pulse: 78 78 (P) 72   Resp: 16 16 (P) 16   Temp: 97.7  F (36.5  C) 97.9  F (36.6  C) (P) 97.8  F (36.6  C)   SpO2:            Electronically Signed By: Chaka Craven MD  June 2, 2019  10:19 AM

## 2019-06-02 NOTE — PROGRESS NOTES
[facility] Post-Partum Progress Note          Assessment and Plan:    Assessment:   Post-partum day #1  Normal spontaneous vaginal delivery  L&D complications: None      Doing well.  No excessive bleeding  Pain well-controlled.      Plan:   Ambulation encouraged  Pain control measures as needed  Reportable signs and symptoms dicussed with the patient  Anticipate discharge tomorrow           Interval History:   Doing well.  Pain is well-controlled.  No fevers.  No history of foul-smelling vaginal discharge.  Good appetite.  Denies chest pain, shortness of breath, nausea or vomiting.  Vaginal bleeding is similar to a heavy menstrual flow.  Ambulatory.          Significant Problems:      Patient Active Problem List   Diagnosis     Supervision of normal pregnancy     Abdominal pain     Encounter for triage in pregnant patient     Indication for care in labor or delivery                   Physical Exam:     All vitals stable  Patient Vitals for the past 12 hrs:   BP Temp Temp src Pulse Resp   06/02/19 0749 (P) 102/66 (P) 97.8  F (36.6  C) (P) Oral (P) 72 (P) 16   06/01/19 2311 114/55 97.9  F (36.6  C) Oral 78 16     Uterine fundus is firm, non-tender and at the level of the umbilicus  Ext NT     Yifan Rico MD  6/2/2019 9:20 AM

## 2019-06-02 NOTE — LACTATION NOTE
Lactation in to see patient, used  phone. Patient had been frustrated with breastfeeding and started bottle feeding, now wanting to switch back to breast. Assisted with latch. Baby first time off and on with not a consistent suck even with breast compression. Encouraged mother to pump after each feed. Pumped but states not milk, discussed pumping and consistency. Second feed baby swallowed at breast. Mother states she really does not want to give formula. Needs a pump for home.

## 2019-06-03 VITALS
RESPIRATION RATE: 16 BRPM | DIASTOLIC BLOOD PRESSURE: 58 MMHG | HEART RATE: 72 BPM | SYSTOLIC BLOOD PRESSURE: 99 MMHG | TEMPERATURE: 98.3 F | OXYGEN SATURATION: 94 %

## 2019-06-03 PROCEDURE — 25000128 H RX IP 250 OP 636: Performed by: OBSTETRICS & GYNECOLOGY

## 2019-06-03 PROCEDURE — 25000132 ZZH RX MED GY IP 250 OP 250 PS 637: Performed by: OBSTETRICS & GYNECOLOGY

## 2019-06-03 PROCEDURE — 90707 MMR VACCINE SC: CPT | Performed by: OBSTETRICS & GYNECOLOGY

## 2019-06-03 PROCEDURE — 40000083 ZZH STATISTIC IP LACTATION SERVICES 1-15 MIN

## 2019-06-03 RX ORDER — IBUPROFEN 600 MG/1
600 TABLET, FILM COATED ORAL EVERY 6 HOURS PRN
Qty: 30 TABLET | Refills: 0 | Status: ON HOLD | OUTPATIENT
Start: 2019-06-03 | End: 2023-01-27

## 2019-06-03 RX ADMIN — IBUPROFEN 800 MG: 800 TABLET ORAL at 11:10

## 2019-06-03 RX ADMIN — MEASLES, MUMPS, AND RUBELLA VIRUS VACCINE LIVE 0.5 ML: 1000; 12500; 1000 INJECTION, POWDER, LYOPHILIZED, FOR SUSPENSION SUBCUTANEOUS at 10:43

## 2019-06-03 NOTE — PLAN OF CARE
Data: Vital signs within normal limits. Postpartum checks within normal limits - see flow record. Patient eating and drinking normally. Patient able to empty bladder independently and is up ambulating. No apparent signs of infection. Patient performing self cares and is able to care for infant.  Action: Declined pain interventions patient stated she was comfortable. Patient education completed. See flow record.  Response: Positive attachment behaviors observed with infant.   Plan: Discharge to home today at 13:45 with all patient belongings. AVS read to patient, all questions and concerns addressed. In person  present for education, vital signs explanation, and AVS reading.

## 2019-06-03 NOTE — LACTATION NOTE
Lactation visit. This is first time mom has breastfeed reporting there was no one to help her with her other babies. This is day 2 and she is already very full so enc mom to nurse only so she does not get painfully engorged now. She is thankful for the helpful nurses and will continue to nurse this baby. Encouraged mom to call us PRN.

## 2019-06-03 NOTE — DISCHARGE SUMMARY
[facility] Postpartum/Discharge Note    S:  Patient without complaints.  Minimal lochia.    O:  Blood pressure 100/59, pulse 72, temperature 98.4  F (36.9  C), temperature source Oral, resp. rate 16, last menstrual period 08/25/2018, SpO2 94 %, unknown if currently breastfeeding.        Urine output adequate        Abdomen - Fundus firm, at umbilicus, nontender        Extremities - No calf tenderness    A:   Postpartum Day# 2, s/p Vaginal delivery - doing well    P:  1)  Discharge home        2)  F/U 6 weeks w/ Primary OB        3)  Discharge meds: Jean Evans MD

## 2019-06-03 NOTE — DISCHARGE INSTRUCTIONS
Vaginal Delivery Discharge Instructions: [facility]  Lactation [phone]  [facility] [phone]  Return to clinic in 6 weeks for follow up  Actividad:     Pida a los miembros de giles sissy y amigos que la ayuden cuando lo necesite.    No ponga nada en giles vagina hasta que giles médico lo permita.    Tómese las próximas semanas con calma para que giles cuerpo tenga tiempo de recuperarse. En mary momento puede hacer cualquier actividad que sienta que puede.    No conduzca si está tomando píldoras para el dolor recetadas por giles médico. Puede conducir si está tomando píldoras de venta will para el dolor.    Llame a giles proveedor de atención médica si tiene alguno de estos síntomas:    Empapa kelle toalla femenina con kev en el correr de 1 hora o ve coágulos más grandes que kelle pelota de golf.    Sangrado que dura más de 6 semanas.    Tiene kelle secreción vaginal que huele mal.     Fiebre de 100.4  F (38  C) o más (temperatura tomada bajo giles lengua) con o sin escalofríos     Dolor, calambres o sensibilidad graves en la región inferior de giles vientre.    Aumento del dolor, hinchazón, enrojecimiento o líquido alrededor de abhijit puntos.    Necesidad más frecuente o urgente de orinar (hacer pis), o ardor al hacerlo.    Enrojecimiento, hinchazón o dolor alrededor de kelle vena en giles pierna.    Problemas para amamantar o un área enrojecida o dolorosa en giles pecho.    Dolor que aumenta o no se va de kelle episiotomía o desgarro en el perineo.    Náuseas y vómitos    Dolor en el pecho y tos o dificultad para respirar.    Problemas para manejar la tristeza, ansiedad o depresión.     Si le preocupa hacerse daño o hacerle daño al bebé, llame al médico de inmediato.     Tiene preguntas o inquietudes después de regresar a casa.    Mantenga abhijit susie limpias:  Lávese siempre las susie antes de tocar el área de giles perineo y los puntos.  [place] ayuda a reducir giles riesgo de infección.  Si abhijit susie no están sucias, puede usar un gel de alcohol  para limpiarse las susie. Mantenga abhijit uñas cortas y limpias.      Vaginal Delivery Discharge Instructions  Activity:     Ask family and friends for help when you need it.    Do not place anything in your vagina until your doctor approves.    Take it easy for the next few weeks to allow your body to recover. You may do any activities you feel up to at that point.    Do not drive while taking pain pills prescribed by your doctor. You may drive if taking over-the-counter pain pills.    Call your health care provider if you have any of these symptoms:    You soak a sanitary pad with blood within 1 hour, or you see blood clots larger than a golf ball.    Bleeding that lasts more than 6 weeks.    You have vaginal discharge that smells bad.     A fever of 100.4  F (38  C) or higher (temperature taken under your tongue), with or without chills     Severe, pain, cramping or tenderness in your lower belly area.    Increased pain, swelling, redness or fluid around your stitches.    A more frequent or urgent need to urinate (pee), or it burns when you pee.    Redness, swelling or pain around a vein in your leg.    Problems breastfeeding, or a red or painful area on your breast.    Pain that increases or does not go away from an episiotomy or perineal tear.    Nausea and vomiting.    Chest pain and cough or are gasping for air.    Problems coping with sadness, anxiety, or depression.     If you have any concerns about hurting yourself or the baby, call your doctor right away.     You have questions or concerns after you return home.    Keep your hands clean:  Always wash your hands before touching your perineal area and stitches.  This helps reduce your risk of infection.  If your hands aren t dirty, you may use an alcohol hand-rub to clean your hands. Keep your nails clean and short.

## 2019-06-03 NOTE — PLAN OF CARE
Pt up and deidra. Voiding without difficulty. Fundus firm and midline. Denies pain, tylenol given for headache relief. Breastfeeding with minimal assistance and formula feeding per mother request, encouraged mother to feed baby every 2-3 hours.  Pacifier being used intermediately for non-nutritious suck. Phone  used. In person  scheduled for 930. Continue to monitor.     Pauline Thao

## 2019-06-03 NOTE — PLAN OF CARE
"Pt appears anxious with some infant cares, was worried about the baby having a stuffy nose. Pt has been formula feeding but decided to try breastfeeding earlier today, when attempting to breastfeed she gets anxious and after a few attempts states \"no she doesn't want it\". Educated pt on infant behavior in early days of life, and that the infant is still learning and it will take some time. Pt took tylenol for pain with relief stated. Used dual phone for  to check in with the patient and discuss the goals for this evening. Pt did state she had a headache and sometimes was lightheaded, vitals WDL. Educated pt to let us know if the headache did not resolve with tylenol, the lightheadedness continued, and if her bleeding increased. Pt verbalized understanding through .   "

## 2019-11-18 ENCOUNTER — APPOINTMENT (OUTPATIENT)
Dept: ULTRASOUND IMAGING | Facility: CLINIC | Age: 24
End: 2019-11-18
Attending: PHYSICIAN ASSISTANT
Payer: COMMERCIAL

## 2019-11-18 ENCOUNTER — HOSPITAL ENCOUNTER (EMERGENCY)
Facility: CLINIC | Age: 24
Discharge: HOME | End: 2019-11-19
Admitting: PHYSICIAN ASSISTANT
Payer: COMMERCIAL

## 2019-11-18 VITALS
HEART RATE: 86 BPM | OXYGEN SATURATION: 100 % | BODY MASS INDEX: 33.13 KG/M2 | WEIGHT: 180 LBS | SYSTOLIC BLOOD PRESSURE: 123 MMHG | RESPIRATION RATE: 18 BRPM | HEIGHT: 62 IN | DIASTOLIC BLOOD PRESSURE: 67 MMHG | TEMPERATURE: 98.3 F

## 2019-11-18 DIAGNOSIS — R10.32 ABDOMINAL PAIN, LEFT LOWER QUADRANT: ICD-10-CM

## 2019-11-18 DIAGNOSIS — Z33.1 PREGNANCY, INCIDENTAL: ICD-10-CM

## 2019-11-18 LAB
ALBUMIN UR-MCNC: 10 MG/DL
ANION GAP SERPL CALCULATED.3IONS-SCNC: 6 MMOL/L (ref 3–14)
APPEARANCE UR: CLEAR
B-HCG SERPL-ACNC: ABNORMAL IU/L (ref 0–5)
BACTERIA #/AREA URNS HPF: ABNORMAL /HPF
BASOPHILS # BLD AUTO: 0 10E9/L (ref 0–0.2)
BASOPHILS NFR BLD AUTO: 0.4 %
BILIRUB UR QL STRIP: NEGATIVE
BUN SERPL-MCNC: 8 MG/DL (ref 7–30)
CALCIUM SERPL-MCNC: 8.7 MG/DL (ref 8.5–10.1)
CHLORIDE SERPL-SCNC: 108 MMOL/L (ref 94–109)
CO2 SERPL-SCNC: 24 MMOL/L (ref 20–32)
COLOR UR AUTO: YELLOW
CREAT SERPL-MCNC: 0.48 MG/DL (ref 0.52–1.04)
DIFFERENTIAL METHOD BLD: ABNORMAL
EOSINOPHIL # BLD AUTO: 0.4 10E9/L (ref 0–0.7)
EOSINOPHIL NFR BLD AUTO: 3.3 %
ERYTHROCYTE [DISTWIDTH] IN BLOOD BY AUTOMATED COUNT: 12.9 % (ref 10–15)
GFR SERPL CREATININE-BSD FRML MDRD: >90 ML/MIN/{1.73_M2}
GLUCOSE SERPL-MCNC: 72 MG/DL (ref 70–99)
GLUCOSE UR STRIP-MCNC: NEGATIVE MG/DL
HCG UR QL: POSITIVE
HCT VFR BLD AUTO: 36.2 % (ref 35–47)
HGB BLD-MCNC: 11.3 G/DL (ref 11.7–15.7)
HGB UR QL STRIP: NEGATIVE
IMM GRANULOCYTES # BLD: 0 10E9/L (ref 0–0.4)
IMM GRANULOCYTES NFR BLD: 0.2 %
KETONES UR STRIP-MCNC: NEGATIVE MG/DL
LEUKOCYTE ESTERASE UR QL STRIP: ABNORMAL
LYMPHOCYTES # BLD AUTO: 3.2 10E9/L (ref 0.8–5.3)
LYMPHOCYTES NFR BLD AUTO: 30.1 %
MCH RBC QN AUTO: 27.4 PG (ref 26.5–33)
MCHC RBC AUTO-ENTMCNC: 31.2 G/DL (ref 31.5–36.5)
MCV RBC AUTO: 88 FL (ref 78–100)
MONOCYTES # BLD AUTO: 0.7 10E9/L (ref 0–1.3)
MONOCYTES NFR BLD AUTO: 7 %
MUCOUS THREADS #/AREA URNS LPF: PRESENT /LPF
NEUTROPHILS # BLD AUTO: 6.3 10E9/L (ref 1.6–8.3)
NEUTROPHILS NFR BLD AUTO: 59 %
NITRATE UR QL: NEGATIVE
NRBC # BLD AUTO: 0 10*3/UL
NRBC BLD AUTO-RTO: 0 /100
PH UR STRIP: 7 PH (ref 5–7)
PLATELET # BLD AUTO: 285 10E9/L (ref 150–450)
POTASSIUM SERPL-SCNC: 3.5 MMOL/L (ref 3.4–5.3)
RBC # BLD AUTO: 4.12 10E12/L (ref 3.8–5.2)
RBC #/AREA URNS AUTO: 1 /HPF (ref 0–2)
SODIUM SERPL-SCNC: 138 MMOL/L (ref 133–144)
SOURCE: ABNORMAL
SP GR UR STRIP: 1.03 (ref 1–1.03)
SQUAMOUS #/AREA URNS AUTO: <1 /HPF (ref 0–1)
UROBILINOGEN UR STRIP-MCNC: 6 MG/DL (ref 0–2)
WBC # BLD AUTO: 10.6 10E9/L (ref 4–11)
WBC #/AREA URNS AUTO: 3 /HPF (ref 0–5)

## 2019-11-18 PROCEDURE — 84702 CHORIONIC GONADOTROPIN TEST: CPT | Performed by: PHYSICIAN ASSISTANT

## 2019-11-18 PROCEDURE — 85025 COMPLETE CBC W/AUTO DIFF WBC: CPT | Performed by: EMERGENCY MEDICINE

## 2019-11-18 PROCEDURE — 80048 BASIC METABOLIC PNL TOTAL CA: CPT | Performed by: EMERGENCY MEDICINE

## 2019-11-18 PROCEDURE — 84702 CHORIONIC GONADOTROPIN TEST: CPT | Performed by: EMERGENCY MEDICINE

## 2019-11-18 PROCEDURE — 99284 EMERGENCY DEPT VISIT MOD MDM: CPT | Mod: 25

## 2019-11-18 PROCEDURE — 76801 OB US < 14 WKS SINGLE FETUS: CPT

## 2019-11-18 PROCEDURE — 81001 URINALYSIS AUTO W/SCOPE: CPT | Performed by: EMERGENCY MEDICINE

## 2019-11-18 PROCEDURE — 81025 URINE PREGNANCY TEST: CPT | Performed by: EMERGENCY MEDICINE

## 2019-11-18 ASSESSMENT — MIFFLIN-ST. JEOR: SCORE: 1519.72

## 2019-11-18 ASSESSMENT — ENCOUNTER SYMPTOMS
VOMITING: 1
ABDOMINAL DISTENTION: 1
NAUSEA: 1
ABDOMINAL PAIN: 1
BLOOD IN STOOL: 0
DIARRHEA: 0

## 2019-11-18 NOTE — ED AVS SNAPSHOT
[facility] Emergency Department  [address]  [facility] [address]  Phone:  [phone]  Fax:  [phone]                                    Katelyn Hollingsworth   MRN: [id number]    Department:  [facility] Emergency Department   Date of Visit:  11/18/2019           After Visit Summary Signature Page    I have received my discharge instructions, and my questions have been answered. I have discussed any challenges I see with this plan with the nurse or doctor.    ..........................................................................................................................................  Patient/Patient Representative Signature      ..........................................................................................................................................  Patient Representative Print Name and Relationship to Patient    ..................................................               ................................................  Date                                   Time    ..........................................................................................................................................  Reviewed by Signature/Title    ...................................................              ..............................................  Date                                               Time          22EPIC Rev 08/18

## 2019-11-19 ENCOUNTER — APPOINTMENT (OUTPATIENT)
Dept: ULTRASOUND IMAGING | Facility: CLINIC | Age: 24
End: 2019-11-19
Attending: PHYSICIAN ASSISTANT
Payer: COMMERCIAL

## 2019-11-19 PROCEDURE — 76705 ECHO EXAM OF ABDOMEN: CPT

## 2019-11-19 NOTE — ED TRIAGE NOTES
Here for left lower abdominal pain started this morning associated with some nausea, chills, and mild burning urination. No pain medication taken at home.  service was use for translation. ABCs intact.

## 2019-11-19 NOTE — ED PROVIDER NOTES
"  History     Chief Complaint:  Abdominal Pain      The history is limited by a language barrier. A  was used (Kazakh).      Katelyn Hollingsworth is a 24 year old female who presents with lower abdominal pain and distention, emesis, nausea, and mild dysuria beginning this morning. Patient describes her abdominal discomfort as stabbing. She also endorses some vaginal itching. Denies diarrhea, constipation, vomiting, vaginal bleeding. Denies vaginal discharge.  No history of abdominal surgeries. Patient states that she took an at home pregnancy test a few days ago which was negative. The patient is Rh positive. No bloody stools.  No fever or chills.  She has been having normal bowel movements without blood.  The patient states that she is sexually active with one male partner.    Allergies:  NKDA     Medications:    The patient is not currently taking any prescribed medications.     Past Medical History:    The patient does not have any past pertinent medical history.     Past Surgical History:    History reviewed. No pertinent surgical history.     Family History:    History reviewed. No pertinent family history.      Social History:  Smoking status: never   Alcohol use: no   Drug use: no   PCP: Sarika Lucas  Marital Status:        Review of Systems   Gastrointestinal: Positive for abdominal distention, abdominal pain, nausea and vomiting. Negative for blood in stool and diarrhea.   Genitourinary: Negative for vaginal bleeding and vaginal discharge.        Positive for vaginal itching.    All other systems reviewed and are negative.    Physical Exam     Patient Vitals for the past 24 hrs:   BP Temp Temp src Pulse Heart Rate Resp SpO2 Height Weight   11/18/19 2019 -- -- -- -- -- -- -- 1.575 m (5' 2\") 81.6 kg (180 lb)   11/18/19 2015 123/67 98.3  F (36.8  C) Oral 86 86 18 100 % -- --        Physical Exam  General: Alert and cooperative with exam. Resting comfortably on " rney  Head:  Scalp is NC/AT  Eyes:  No scleral icterus, PERRL  ENT:  The external nose and ears are normal.   Neck:  Normal range of motion without rigidity.  CV:  Regular rate and rhythm    No pathologic murmur, rubs, or gallops.  Resp:  Breath sounds are clear bilaterally.  No crackles, wheezes, rhonchi.    Non-labored, no retractions or accessory muscle use  GI:  Abdomen is soft, no distension, Minimal diffuse lower abdominal tenderness, no masses. No peritoneal signs.  Bowel sounds present in all quadrants  :  No suprapubic or flank tenderness  GYN:  Patient declined pelvic exam.  MS:  No lower extremity edema or asymmetric calf swelling.  Skin:  Warm and dry, No rash or lesions noted.  Neuro: Oriented. No gross motor deficits.  Psych: Awake. Alert. Normal affect. Appropriate interactions.      Emergency Department Course     Imaging:  Radiographic findings were communicated with the patient who voiced understanding of the findings.    US OB <14 Weeks W Transvaginal  1. Single live intrauterine pregnancy at 6 weeks 6 days estimated gestational age based upon crown-rump length measurement on this study. The estimated date of delivery is 7/7/2020.  2. Unremarkable appearance of the ovaries.  3. No free fluid in the pelvis.  As read by Radiology.    US Appendix Only  1.  Appendix not visualized.  2.  Correlation necessary.  As read by Radiology.    Laboratory:  BMP: Creatinine 0.48 (L), o/w WNL  CBC: WBC 10.6, HGB 11.3 (L),    HCG quant: 76,449 (H)    UA: protein albumin 10, urinobilinogen 6.0 (H), leukocyte esterase trace, bacteria few, mucous present, o/w negative   HCG qual: positive     Emergency Department Course:  2200: Nursing notes and vitals reviewed. I performed an exam of the patient as documented above.     Blood drawn. This was sent to the lab for further testing, results above. The patient provided a urine sample here in the emergency department. This was sent for laboratory testing,  findings above.      The patient was sent for an OB ultrasound while in the emergency department, findings above.     0025: I rechecked the patient and discussed the results of her workup thus far.     The patient was sent for an appendix while in the emergency department, findings above.    0143:  I rechecked the patient and discussed the results of her workup thus far. She feels improved and would like to go home.     Findings and plan explained to the Patient. Patient discharged home with instructions regarding supportive care, medications, and reasons to return. The importance of close follow-up was reviewed.     I personally reviewed the laboratory results with the Patient and answered all related questions prior to discharge.     Impression & Plan      Medical Decision Makin-year-old female presents with lower abdominal discomfort and distention.  Patient history and records reviewed.  On examination, the patient is afebrile and well-appearing with normal vitals.  A broad differential was considered.  Pregnancy test was positive, and subsequent ultrasound demonstrated the presence of a single live intrauterine pregnancy of approximately 7 weeks gestation.  She is not having any vaginal bleeding and is Rh+.  No risk factors for heterotopic pregnancy and ovaries appear normal.  Patient declined pelvic exam.  She does have some minimal bilateral lower abdominal tenderness, but no focal tenderness and I think intra-abdominal catastrophe is very unlikely at this time.  Lab work demonstrates normal white blood cell count, hemoglobin, nevertheless ultrasound of the appendix was ordered which unfortunately was not able to visualize the appendix.      The patient did feel improved following a period of observation.  We discussed options including proceeding to CT scan, versus admission for observation, versus discharged home with close follow-up tomorrow with her OB/GYN.  The patient feels improved and would like  to go home.  I think this is reasonable given my relatively low suspicion with her benign examination, normal white blood cell counts, and diffuse symptoms.  She does not have any diarrhea to suggest colitis or diverticulitis.  Afebrile with normal white blood cell count and doubt abscess, and she has no vaginal discharge and doubt PID.  Urinalysis not suggestive of infection, but will send for culture given pregnant.  The ovaries appear normal, and I think this is unlikely to represent torsion given bilateral symptoms and absence of ovarian masses.  She will follow-up tomorrow morning with her OB/GYN.  She will return immediately if she develops any new or worsening symptoms including worsening of her pain, vaginal bleeding, fevers etc.    Diagnosis:    ICD-10-CM    1. Abdominal pain, left lower quadrant R10.32    2. Pregnancy, incidental Z33.1          Disposition:  discharged to home    Margaret CARTER, am serving as a scribe at 10:00 PM on 11/18/2019 to document services personally performed by Liang Garcia PA-C, based on my observations and the provider's statements to me.     Margaret Larsen  11/18/2019   [facility] EMERGENCY DEPARTMENT       Liang Garcia PA-C  11/19/19 0238

## 2020-01-27 ENCOUNTER — RECORDS - HEALTHEAST (OUTPATIENT)
Dept: LAB | Facility: CLINIC | Age: 25
End: 2020-01-27

## 2020-01-27 LAB
ALBUMIN SERPL-MCNC: 3.3 G/DL (ref 3.5–5)
ALP SERPL-CCNC: 87 U/L (ref 45–120)
ALT SERPL W P-5'-P-CCNC: 14 U/L (ref 0–45)
AST SERPL W P-5'-P-CCNC: 21 U/L (ref 0–40)
BASOPHILS # BLD AUTO: 0.1 THOU/UL (ref 0–0.2)
BASOPHILS NFR BLD AUTO: 1 % (ref 0–2)
BILIRUB DIRECT SERPL-MCNC: <0.1 MG/DL
BILIRUB SERPL-MCNC: 0.3 MG/DL (ref 0–1)
EOSINOPHIL # BLD AUTO: 0.1 THOU/UL (ref 0–0.4)
EOSINOPHIL NFR BLD AUTO: 2 % (ref 0–6)
ERYTHROCYTE [DISTWIDTH] IN BLOOD BY AUTOMATED COUNT: 13.1 % (ref 11–14.5)
HCT VFR BLD AUTO: 36.1 % (ref 35–47)
HGB BLD-MCNC: 11.5 G/DL (ref 12–16)
HIV 1+2 AB+HIV1 P24 AG SERPL QL IA: NEGATIVE
LYMPHOCYTES # BLD AUTO: 2.1 THOU/UL (ref 0.8–4.4)
LYMPHOCYTES NFR BLD AUTO: 22 % (ref 20–40)
MCH RBC QN AUTO: 27.4 PG (ref 27–34)
MCHC RBC AUTO-ENTMCNC: 31.9 G/DL (ref 32–36)
MCV RBC AUTO: 86 FL (ref 80–100)
MONOCYTES # BLD AUTO: 0.5 THOU/UL (ref 0–0.9)
MONOCYTES NFR BLD AUTO: 5 % (ref 2–10)
NEUTROPHILS # BLD AUTO: 6.8 THOU/UL (ref 2–7.7)
NEUTROPHILS NFR BLD AUTO: 71 % (ref 50–70)
PLATELET # BLD AUTO: 255 THOU/UL (ref 140–440)
PMV BLD AUTO: 10.3 FL (ref 8.5–12.5)
PROT SERPL-MCNC: 6.6 G/DL (ref 6–8)
RBC # BLD AUTO: 4.19 MILL/UL (ref 3.8–5.4)
WBC: 9.6 THOU/UL (ref 4–11)

## 2020-01-28 LAB
ABO/RH(D): NORMAL
ABORH REPEAT: NORMAL
ANTIBODY SCREEN: NEGATIVE
BACTERIA SPEC CULT: NO GROWTH
C TRACH DNA SPEC QL PROBE+SIG AMP: NEGATIVE
HBV SURFACE AG SERPL QL IA: NEGATIVE
N GONORRHOEA DNA SPEC QL NAA+PROBE: NEGATIVE
RUBV IGG SERPL QL IA: POSITIVE
T PALLIDUM AB SER QL: NEGATIVE

## 2020-04-14 ENCOUNTER — RECORDS - HEALTHEAST (OUTPATIENT)
Dept: LAB | Facility: CLINIC | Age: 25
End: 2020-04-14

## 2020-04-15 LAB
BACTERIA SPEC CULT: NO GROWTH
T PALLIDUM AB SER QL: NEGATIVE

## 2020-06-04 ENCOUNTER — RECORDS - HEALTHEAST (OUTPATIENT)
Dept: LAB | Facility: CLINIC | Age: 25
End: 2020-06-04

## 2020-06-05 LAB
ALLERGIC TO PENICILLIN: NO
GP B STREP DNA SPEC QL NAA+PROBE: NEGATIVE

## 2022-11-17 ENCOUNTER — HOSPITAL ENCOUNTER (EMERGENCY)
Facility: CLINIC | Age: 27
Discharge: HOME | End: 2022-11-17
Attending: EMERGENCY MEDICINE | Admitting: EMERGENCY MEDICINE
Payer: COMMERCIAL

## 2022-11-17 VITALS
SYSTOLIC BLOOD PRESSURE: 105 MMHG | OXYGEN SATURATION: 97 % | TEMPERATURE: 97.5 F | DIASTOLIC BLOOD PRESSURE: 54 MMHG | HEART RATE: 61 BPM | RESPIRATION RATE: 16 BRPM

## 2022-11-17 DIAGNOSIS — R10.84 ABDOMINAL PAIN, GENERALIZED: ICD-10-CM

## 2022-11-17 LAB
ALBUMIN SERPL BCG-MCNC: 4.4 G/DL (ref 3.5–5.2)
ALBUMIN UR-MCNC: NEGATIVE MG/DL
ALP SERPL-CCNC: 133 U/L (ref 35–104)
ALT SERPL W P-5'-P-CCNC: 24 U/L (ref 10–35)
ANION GAP SERPL CALCULATED.3IONS-SCNC: 12 MMOL/L (ref 7–15)
APPEARANCE UR: CLEAR
AST SERPL W P-5'-P-CCNC: 29 U/L (ref 10–35)
BASOPHILS # BLD AUTO: 0.1 10E3/UL (ref 0–0.2)
BASOPHILS NFR BLD AUTO: 1 %
BILIRUB SERPL-MCNC: 0.3 MG/DL
BILIRUB UR QL STRIP: NEGATIVE
BUN SERPL-MCNC: 20.9 MG/DL (ref 6–20)
CALCIUM SERPL-MCNC: 9.5 MG/DL (ref 8.6–10)
CHLORIDE SERPL-SCNC: 103 MMOL/L (ref 98–107)
CLUE CELLS: NORMAL
COLOR UR AUTO: NORMAL
CREAT SERPL-MCNC: 0.85 MG/DL (ref 0.51–0.95)
DEPRECATED HCO3 PLAS-SCNC: 25 MMOL/L (ref 22–29)
EOSINOPHIL # BLD AUTO: 0.3 10E3/UL (ref 0–0.7)
EOSINOPHIL NFR BLD AUTO: 3 %
ERYTHROCYTE [DISTWIDTH] IN BLOOD BY AUTOMATED COUNT: 12.5 % (ref 10–15)
GFR SERPL CREATININE-BSD FRML MDRD: >90 ML/MIN/1.73M2
GLUCOSE SERPL-MCNC: 81 MG/DL (ref 70–99)
GLUCOSE UR STRIP-MCNC: NEGATIVE MG/DL
HCG INTACT+B SERPL-ACNC: <1 MIU/ML
HCT VFR BLD AUTO: 42.5 % (ref 35–47)
HGB BLD-MCNC: 13.6 G/DL (ref 11.7–15.7)
HGB UR QL STRIP: NEGATIVE
HOLD SPECIMEN: NORMAL
HOLD SPECIMEN: NORMAL
IMM GRANULOCYTES # BLD: 0 10E3/UL
IMM GRANULOCYTES NFR BLD: 0 %
KETONES UR STRIP-MCNC: NEGATIVE MG/DL
LEUKOCYTE ESTERASE UR QL STRIP: NEGATIVE
LYMPHOCYTES # BLD AUTO: 2.7 10E3/UL (ref 0.8–5.3)
LYMPHOCYTES NFR BLD AUTO: 29 %
MCH RBC QN AUTO: 29 PG (ref 26.5–33)
MCHC RBC AUTO-ENTMCNC: 32 G/DL (ref 31.5–36.5)
MCV RBC AUTO: 91 FL (ref 78–100)
MONOCYTES # BLD AUTO: 0.5 10E3/UL (ref 0–1.3)
MONOCYTES NFR BLD AUTO: 5 %
NEUTROPHILS # BLD AUTO: 5.6 10E3/UL (ref 1.6–8.3)
NEUTROPHILS NFR BLD AUTO: 62 %
NITRATE UR QL: NEGATIVE
NRBC # BLD AUTO: 0 10E3/UL
NRBC BLD AUTO-RTO: 0 /100
PH UR STRIP: 7 [PH] (ref 5–7)
PLATELET # BLD AUTO: 278 10E3/UL (ref 150–450)
POTASSIUM SERPL-SCNC: 3.6 MMOL/L (ref 3.4–5.3)
PROT SERPL-MCNC: 7.6 G/DL (ref 6.4–8.3)
RBC # BLD AUTO: 4.69 10E6/UL (ref 3.8–5.2)
RBC URINE: <1 /HPF
SODIUM SERPL-SCNC: 140 MMOL/L (ref 136–145)
SP GR UR STRIP: 1.03 (ref 1–1.03)
SQUAMOUS EPITHELIAL: 1 /HPF
TRICHOMONAS, WET PREP: NORMAL
UROBILINOGEN UR STRIP-MCNC: NORMAL MG/DL
WBC # BLD AUTO: 9.1 10E3/UL (ref 4–11)
WBC URINE: 0 /HPF
WBC'S/HIGH POWER FIELD, WET PREP: NORMAL
YEAST, WET PREP: NORMAL

## 2022-11-17 PROCEDURE — 250N000013 HC RX MED GY IP 250 OP 250 PS 637: Performed by: EMERGENCY MEDICINE

## 2022-11-17 PROCEDURE — 99283 EMERGENCY DEPT VISIT LOW MDM: CPT

## 2022-11-17 PROCEDURE — 81001 URINALYSIS AUTO W/SCOPE: CPT | Performed by: EMERGENCY MEDICINE

## 2022-11-17 PROCEDURE — 250N000011 HC RX IP 250 OP 636: Performed by: EMERGENCY MEDICINE

## 2022-11-17 PROCEDURE — 87491 CHLMYD TRACH DNA AMP PROBE: CPT | Performed by: EMERGENCY MEDICINE

## 2022-11-17 PROCEDURE — 87591 N.GONORRHOEAE DNA AMP PROB: CPT | Performed by: EMERGENCY MEDICINE

## 2022-11-17 PROCEDURE — 80053 COMPREHEN METABOLIC PANEL: CPT | Performed by: EMERGENCY MEDICINE

## 2022-11-17 PROCEDURE — 87210 SMEAR WET MOUNT SALINE/INK: CPT | Performed by: EMERGENCY MEDICINE

## 2022-11-17 PROCEDURE — 85014 HEMATOCRIT: CPT | Performed by: EMERGENCY MEDICINE

## 2022-11-17 PROCEDURE — 36415 COLL VENOUS BLD VENIPUNCTURE: CPT | Performed by: EMERGENCY MEDICINE

## 2022-11-17 PROCEDURE — 84702 CHORIONIC GONADOTROPIN TEST: CPT | Performed by: EMERGENCY MEDICINE

## 2022-11-17 RX ORDER — ACETAMINOPHEN 500 MG
1000 TABLET ORAL EVERY 4 HOURS PRN
Status: DISCONTINUED | OUTPATIENT
Start: 2022-11-17 | End: 2022-11-17 | Stop reason: HOSPADM

## 2022-11-17 RX ORDER — ONDANSETRON 4 MG/1
4 TABLET, ORALLY DISINTEGRATING ORAL ONCE
Status: COMPLETED | OUTPATIENT
Start: 2022-11-17 | End: 2022-11-17

## 2022-11-17 RX ADMIN — ONDANSETRON 4 MG: 4 TABLET, ORALLY DISINTEGRATING ORAL at 07:41

## 2022-11-17 RX ADMIN — ACETAMINOPHEN 1000 MG: 500 TABLET ORAL at 07:41

## 2022-11-17 ASSESSMENT — ENCOUNTER SYMPTOMS
NAUSEA: 1
DYSURIA: 0
ABDOMINAL PAIN: 1
FEVER: 0
DIARRHEA: 0
VOMITING: 0
SHORTNESS OF BREATH: 0

## 2022-11-17 ASSESSMENT — ACTIVITIES OF DAILY LIVING (ADL): ADLS_ACUITY_SCORE: 35

## 2022-11-17 NOTE — ED PROVIDER NOTES
History   Chief Complaint:  Abdominal Pain     The history is provided by the patient.      Katelyn Baltazar is a 27 year old female with history of miscarriage, ectopic pregnancy, and anxiety who presents with lower abdominal pain and cramps on and off for 4-5 days with associated nausea. She notes extreme pain when present. Nothing worsens or helps her abdominal pain. She notes a slight change in vaginal discharge, no pus or blood, just slightly more brown. She denies chest pain, shortness of breath, vomiting, diarrhea, dysuria, urinary symptoms, or fevers. Patient has an anti contraceptive, Implanon in arm.     Review of Systems   Constitutional: Negative for fever.   Respiratory: Negative for shortness of breath.    Cardiovascular: Negative for chest pain.   Gastrointestinal: Positive for abdominal pain (and cramps) and nausea. Negative for diarrhea and vomiting.   Genitourinary: Positive for vaginal discharge (Slight change, more brown). Negative for dysuria.   All other systems reviewed and are negative.        Allergies:  No Known Allergies    Medications:  Pyridoxine  Doxylamine succinate    Past Medical History:     History of miscarriage  History of ectopic pregnancy  Anxiety  Glaucoma  Vaginal delivery  Closed nondisplaced fracture of medial malleolus fever left tibia      Family History:    Brother- mental retardation    Social History:  PCP: Sarika Lucas   Patient presents alone.  Patient arrived by car.     Physical Exam     Patient Vitals for the past 24 hrs:   BP Temp Temp src Pulse Resp SpO2   11/17/22 0907 105/54 -- -- -- -- 97 %   11/17/22 0848 106/54 -- -- 61 -- 100 %   11/17/22 0838 114/59 -- -- -- -- 100 %   11/17/22 0753 117/45 -- -- 76 -- 100 %   11/17/22 0709 130/71 97.5  F (36.4  C) Temporal 82 16 100 %       Physical Exam  Vitals: reviewed by me  General: Pt seen on [facility], pleasant, cooperative, and alert to conversation  Eyes: Tracking well, clear conjunctiva  BL  ENT: MMM, midline trachea.   Lungs: No tachypnea, no accessory muscle use. No respiratory distress.  Lungs are clear to auscultation without  CV: Rate as above, normal S1-S2, no additional heart sounds noted  Abd: Soft, nonfocal tenderness throughout the abdomen, no guarding, no rebound.  Specifically no tenderness to the right upper quadrant, or McBurney's point.  No suprapubic tenderness either.  Pelvic exam done with RN chaperone, normal external exam, healthy pink epithelium throughout, no discharge, no bleeding, no purulence or inflammation noted on my exam  MSK: no joint effusion.  No evidence of trauma  Skin: No rash  Neuro: Clear speech and no facial droop.  Psych: Not RIS, no e/o AH/VH      Emergency Department Course     Laboratory:  Labs Ordered and Resulted from Time of ED Arrival to Time of ED Departure   COMPREHENSIVE METABOLIC PANEL - Abnormal       Result Value    Sodium 140      Potassium 3.6      Chloride 103      Carbon Dioxide (CO2) 25      Anion Gap 12      Urea Nitrogen 20.9 (*)     Creatinine 0.85      Calcium 9.5      Glucose 81      Alkaline Phosphatase 133 (*)     AST 29      ALT 24      Protein Total 7.6      Albumin 4.4      Bilirubin Total 0.3      GFR Estimate >90     HCG QUANTITATIVE PREGNANCY - Normal    hCG Quantitative <1     ROUTINE UA WITH MICROSCOPIC REFLEX TO CULTURE - Normal    Color Urine Light Yellow      Appearance Urine Clear      Glucose Urine Negative      Bilirubin Urine Negative      Ketones Urine Negative      Specific Gravity Urine 1.027      Blood Urine Negative      pH Urine 7.0      Protein Albumin Urine Negative      Urobilinogen Urine Normal      Nitrite Urine Negative      Leukocyte Esterase Urine Negative      RBC Urine <1      WBC Urine 0      Squamous Epithelials Urine 1     WET PREPARATION - Normal    Trichomonas Absent      Yeast Absent      Clue Cells Absent      WBCs/high power field None     CBC WITH PLATELETS AND DIFFERENTIAL    WBC Count 9.1       RBC Count 4.69      Hemoglobin 13.6      Hematocrit 42.5      MCV 91      MCH 29.0      MCHC 32.0      RDW 12.5      Platelet Count 278      % Neutrophils 62      % Lymphocytes 29      % Monocytes 5      % Eosinophils 3      % Basophils 1      % Immature Granulocytes 0      NRBCs per 100 WBC 0      Absolute Neutrophils 5.6      Absolute Lymphocytes 2.7      Absolute Monocytes 0.5      Absolute Eosinophils 0.3      Absolute Basophils 0.1      Absolute Immature Granulocytes 0.0      Absolute NRBCs 0.0     CHLAMYDIA TRACHOMATIS PCR   NEISSERIA GONORRHOEAE PCR      Emergency Department Course:    Reviewed:  I reviewed nursing notes, vitals, past medical history and Care Everywhere    Assessments:  0728 I obtained history and examined the patient as noted above.   0924 I performed a pelvic examination.     Interventions:  0741 Acetaminophen 1,000 mg PO  0741 Ondansetron 4 mg PO    Disposition:  The patient was discharged to home.     Impression & Plan     Medical Decision Making:  This is a very pleasant 27-year-old female presents emergency with abdominal pain.  No clear cause, abdomen is benign on my exam.  Her vitals are reassuring, her labs are also reassuring.  Given the vaginal complaints, I did do a pelvic exam which shows no evidence of BV or an infection, I do not see any cervicitis, and I think that her slight brown color discharge may simply be minor bleeding from some commendation of her Implanon and menstruation.  She does not appear to be septic, does not have an acute abdomen, and no emergent malady or cause was found for her issues today.  He understands this and knows to follow with her regular doctor the next 1 week.  All of her questions were answered, she feels comfortable this plan, will plan for discharge as above.  No indication for CT scan at this time given benign abdomen and low pretest probability at this time, would have expected declaration of emergent illness as well on exam or work-up after  5 days of symptoms.  She does appear to be reliable, I believe she will come back to the ER with any concerns or new symptoms, and red flags or exactly when to come back to the ER were discussed in detail    Diagnosis:    ICD-10-CM    1. Abdominal pain, generalized  R10.84         Scribe Disclosure:  I, Candace Christie, am serving as a scribe at 7:24 AM on 11/17/2022 to document services personally performed by Gumaro Tse MD based on my observations and the provider's statements to me.            Gumaro Tse MD  11/17/22 6759

## 2022-11-17 NOTE — DISCHARGE INSTRUCTIONS
As we discussed, no clear cause of your abdominal pain was found today, though your labs are reassuring, as is your physical exam, and your pelvic exam does not show any evidence of an infection.  Come back to the ER immediately with any worsening symptoms you have, if you have pain that is not made better with Tylenol, or with any change in your pain.  Please be certain to follow with your regular doctor in the next 1 week as you need to have your pain continued to be looked at.  Come back with any other concerns, and be certain to follow with your regular doctor as above.

## 2022-11-17 NOTE — ED TRIAGE NOTES
Pt presents with low abdominal and back pain x2 days. Mild nausea without vomiting. NO CP or SOB. Denies diarrhea or fevers. No urinary sx. ABCs intact.

## 2022-11-18 LAB
C TRACH DNA SPEC QL NAA+PROBE: NEGATIVE
N GONORRHOEA DNA SPEC QL NAA+PROBE: NEGATIVE

## 2022-11-18 NOTE — RESULT ENCOUNTER NOTE
Final result for both N. Gonorrhoeae PCR and Chlamydia Trachomatis PCR are NEGATIVE.  No treatment or change in treatment per [facility] ED Lab Result N. Gonorrhea AND/OR Chlamydia T. protocol.

## 2022-11-26 ENCOUNTER — HOSPITAL ENCOUNTER (EMERGENCY)
Facility: CLINIC | Age: 27
Discharge: HOME | End: 2022-11-26
Attending: EMERGENCY MEDICINE | Admitting: EMERGENCY MEDICINE
Payer: COMMERCIAL

## 2022-11-26 VITALS
DIASTOLIC BLOOD PRESSURE: 64 MMHG | HEART RATE: 80 BPM | TEMPERATURE: 97.5 F | OXYGEN SATURATION: 96 % | SYSTOLIC BLOOD PRESSURE: 126 MMHG | RESPIRATION RATE: 24 BRPM

## 2022-11-26 DIAGNOSIS — J10.1 INFLUENZA A: ICD-10-CM

## 2022-11-26 LAB
FLUAV RNA SPEC QL NAA+PROBE: POSITIVE
FLUBV RNA RESP QL NAA+PROBE: NEGATIVE
RSV RNA SPEC NAA+PROBE: NEGATIVE
SARS-COV-2 RNA RESP QL NAA+PROBE: NEGATIVE

## 2022-11-26 PROCEDURE — 87637 SARSCOV2&INF A&B&RSV AMP PRB: CPT | Performed by: EMERGENCY MEDICINE

## 2022-11-26 PROCEDURE — 99284 EMERGENCY DEPT VISIT MOD MDM: CPT | Mod: CS

## 2022-11-26 PROCEDURE — C9803 HOPD COVID-19 SPEC COLLECT: HCPCS

## 2022-11-26 RX ORDER — ONDANSETRON 4 MG/1
4 TABLET, ORALLY DISINTEGRATING ORAL EVERY 8 HOURS PRN
Qty: 10 TABLET | Refills: 0 | Status: ON HOLD | OUTPATIENT
Start: 2022-11-26 | End: 2023-01-27

## 2022-11-26 RX ORDER — OSELTAMIVIR PHOSPHATE 75 MG/1
75 CAPSULE ORAL 2 TIMES DAILY
Qty: 10 CAPSULE | Refills: 0 | Status: SHIPPED | OUTPATIENT
Start: 2022-11-26 | End: 2022-12-01

## 2022-11-26 ASSESSMENT — ENCOUNTER SYMPTOMS
VOMITING: 1
DIARRHEA: 0
MYALGIAS: 1
SORE THROAT: 1
FEVER: 1
COUGH: 1

## 2022-11-26 ASSESSMENT — ACTIVITIES OF DAILY LIVING (ADL): ADLS_ACUITY_SCORE: 33

## 2022-11-26 NOTE — LETTER
November 26, 2022      To Whom It May Concern:      Katelyn Baltazar was seen in our Emergency Department today, 11/26/22.  I expect her condition to improve over the next 3 days.  She may return to work/school when improved.    Sincerely,        Anel EPPERSON RN

## 2022-11-26 NOTE — ED PROVIDER NOTES
History   Chief Complaint:  Generalized Body Aches       The history is provided by the patient. A  was used.      Katelyn Baltazar is a 27 year old female who presents with generalized body aches. The patient reports that 3 days ago she developed body aches, fever, cough, sore throat, and vomiting. She has taken ibuprofen and Tylenol for her symptoms with the most recent dose at 0500.  She denies diarrhea.    Review of Systems   Constitutional: Positive for fever.   HENT: Positive for sore throat.    Respiratory: Positive for cough.    Gastrointestinal: Positive for vomiting. Negative for diarrhea.   Musculoskeletal: Positive for myalgias.   All other systems reviewed and are negative.    Allergies:  No known drug allergies    Medications:  The patient is not currently taking any prescribed medications.    Past Medical History:     Ectopic pregnancy  Anxiety     Family History:    Father: Heart disease  Mother: Asthma    Social History:  The patient presents to the ED with her daughter. They arrived via private vehicle.    Physical Exam     Patient Vitals for the past 24 hrs:   BP Temp Temp src Pulse Resp SpO2   11/26/22 0557 126/64 97.5  F (36.4  C) Temporal 80 24 96 %       Physical Exam  VS: Reviewed per above  HENT: Mucous membranes moist. Uvula midline, no tonsillar hypertrophy nor asymmetry. Tolerating secretions, normal phonation. No nuchal rigidity.  EYES: sclera anicteric  CV: Rate as noted, regular rhythm.   RESP: Effort normal. Breath sounds are normal bilaterally.  Abdomen: no focal tenderness.  no rebound or guarding.  NEURO: Alert, moving all extremities  MSK: No deformity of the extremities  SKIN: Warm and dry      Emergency Department Course     Laboratory:  Labs Ordered and Resulted from Time of ED Arrival to Time of ED Departure   INFLUENZA A/B & SARS-COV2 PCR MULTIPLEX - Abnormal       Result Value    Influenza A PCR Positive (*)     Influenza B PCR Negative      RSV PCR  Negative      SARS CoV2 PCR Negative        Emergency Department Course:       Reviewed:  I reviewed nursing notes, vitals, past medical history and Care Everywhere    Assessments:  0857 I obtained history and examined the patient as noted above.     Disposition:  The patient was discharged to home.     Impression & Plan     Medical Decision Making:  Katelyn Baltazar is a 27 year old female who presents for evaluation of cough and fever.  They also have myalgias. PCR test is consistent with  influenza.  The patient is not out of treatment window for influenza and medications ordered as noted above.  She is not dehydrated and work of breathing is normal. Close followup of primary care physician is indicated and return to the ED for high fevers > 103 for more than 48 hours more, increasing productive cough, shortness of breath, or confusion.  There is no signs of serious bacterial infection such as bacteremia, meningitis, UTI/pyelonephritis, strep pharyngitis, etc.  Zofran script also provided for reports of nausea and vomiting.     Diagnosis:    ICD-10-CM    1. Influenza A  J10.1           Discharge Medications:  New Prescriptions    ONDANSETRON (ZOFRAN ODT) 4 MG ODT TAB    Take 1 tablet (4 mg) by mouth every 8 hours as needed for nausea or vomiting    OSELTAMIVIR (TAMIFLU) 75 MG CAPSULE    Take 1 capsule (75 mg) by mouth 2 times daily for 5 days       Scribe Disclosure:  EMMA, Heidi Amaya, am serving as a scribe at 8:57 AM on 11/26/2022 to document services personally performed by Gresyon Billy MD based on my observations and the provider's statements to me.        Greyson Billy MD  11/26/22 5597

## 2022-11-26 NOTE — ED TRIAGE NOTES
Pt aox4, ABCs intact. Pt c/o fever, body aches, and weakness x3 days. Pt took ibuprofen and tylenol last 4 hours PTA. States that she is unable to care for her child due to how poor she feels.

## 2022-12-04 ENCOUNTER — HOSPITAL ENCOUNTER (EMERGENCY)
Facility: CLINIC | Age: 27
Discharge: HOME | End: 2022-12-04
Attending: EMERGENCY MEDICINE | Admitting: EMERGENCY MEDICINE
Payer: COMMERCIAL

## 2022-12-04 VITALS
TEMPERATURE: 97.4 F | RESPIRATION RATE: 20 BRPM | OXYGEN SATURATION: 96 % | HEART RATE: 84 BPM | DIASTOLIC BLOOD PRESSURE: 75 MMHG | SYSTOLIC BLOOD PRESSURE: 112 MMHG

## 2022-12-04 DIAGNOSIS — J10.1 INFLUENZA A: ICD-10-CM

## 2022-12-04 DIAGNOSIS — H66.91 ACUTE RIGHT OTITIS MEDIA: ICD-10-CM

## 2022-12-04 LAB
DEPRECATED S PYO AG THROAT QL EIA: NEGATIVE
FLUAV RNA SPEC QL NAA+PROBE: POSITIVE
FLUBV RNA RESP QL NAA+PROBE: NEGATIVE
RSV RNA SPEC NAA+PROBE: NEGATIVE
SARS-COV-2 RNA RESP QL NAA+PROBE: NEGATIVE

## 2022-12-04 PROCEDURE — 250N000013 HC RX MED GY IP 250 OP 250 PS 637: Performed by: EMERGENCY MEDICINE

## 2022-12-04 PROCEDURE — C9803 HOPD COVID-19 SPEC COLLECT: HCPCS

## 2022-12-04 PROCEDURE — 250N000011 HC RX IP 250 OP 636: Performed by: EMERGENCY MEDICINE

## 2022-12-04 PROCEDURE — 87637 SARSCOV2&INF A&B&RSV AMP PRB: CPT | Performed by: EMERGENCY MEDICINE

## 2022-12-04 PROCEDURE — 87651 STREP A DNA AMP PROBE: CPT | Performed by: EMERGENCY MEDICINE

## 2022-12-04 PROCEDURE — 99283 EMERGENCY DEPT VISIT LOW MDM: CPT | Mod: CS

## 2022-12-04 RX ORDER — ACETAMINOPHEN 325 MG/1
975 TABLET ORAL ONCE
Status: COMPLETED | OUTPATIENT
Start: 2022-12-04 | End: 2022-12-04

## 2022-12-04 RX ORDER — ONDANSETRON 4 MG/1
4 TABLET, ORALLY DISINTEGRATING ORAL ONCE
Status: COMPLETED | OUTPATIENT
Start: 2022-12-04 | End: 2022-12-04

## 2022-12-04 RX ORDER — IBUPROFEN 600 MG/1
600 TABLET, FILM COATED ORAL ONCE
Status: COMPLETED | OUTPATIENT
Start: 2022-12-04 | End: 2022-12-04

## 2022-12-04 RX ORDER — OXYCODONE HYDROCHLORIDE 5 MG/1
5 TABLET ORAL ONCE
Status: COMPLETED | OUTPATIENT
Start: 2022-12-04 | End: 2022-12-04

## 2022-12-04 RX ADMIN — DEXAMETHASONE 10 MG: 2 TABLET ORAL at 20:33

## 2022-12-04 RX ADMIN — IBUPROFEN 600 MG: 600 TABLET, FILM COATED ORAL at 17:01

## 2022-12-04 RX ADMIN — ONDANSETRON 4 MG: 4 TABLET, ORALLY DISINTEGRATING ORAL at 20:33

## 2022-12-04 RX ADMIN — ACETAMINOPHEN 975 MG: 325 TABLET ORAL at 17:01

## 2022-12-04 RX ADMIN — OXYCODONE HYDROCHLORIDE 5 MG: 5 TABLET ORAL at 20:33

## 2022-12-04 ASSESSMENT — ENCOUNTER SYMPTOMS
HEADACHES: 1
SHORTNESS OF BREATH: 1
COUGH: 1
NAUSEA: 0
VOMITING: 0
FATIGUE: 1
MYALGIAS: 1

## 2022-12-04 ASSESSMENT — ACTIVITIES OF DAILY LIVING (ADL): ADLS_ACUITY_SCORE: 33

## 2022-12-04 NOTE — Clinical Note
Katelyn Baltazar was seen and treated in our emergency department on 12/4/2022.  She may return to work on 12/07/2022.  If she is feeling improved.     If you have any questions or concerns, please don't hesitate to call.      Chasidy Ocasio MD

## 2022-12-04 NOTE — ED TRIAGE NOTES
Pt presents via EMS for evaluation of right ear pain that started this morning. Has also had flu like symptoms for last 2-3 days including shortness of breath, cough, congestion and body aches. Took otc ear drops for pain with no relief. Dx with influenza on 11/26/22.

## 2022-12-05 LAB — GROUP A STREP BY PCR: NOT DETECTED

## 2022-12-05 NOTE — ED PROVIDER NOTES
History   Chief Complaint:  Otalgia       HPI   Katelyn Baltazar is a 27 year old female who presents with right ear pain since morning in the setting of flu like symptoms for the past 3 days and an influenza a diagnosis on 11/26/22. She developed shortness of breath, dry cough, headaches, fatigue, congestion, and body aches 3 days ago. She also reports 3 days of diarrhea 3 times a day. Patient last urinated this afternoon and her appetite has been reduced due to difficulty swallowing. She has taken over the counter ear drops without relief and tylenol plus Advil here in ED have not improve her pain. Patient denies nausea, vomiting, concern for pregnancy, sick contacts at home, and use of daily medications.     Review of Systems   Constitutional: Positive for fatigue.   HENT: Positive for congestion and ear pain.    Respiratory: Positive for cough and shortness of breath.    Gastrointestinal: Negative for nausea and vomiting.   Musculoskeletal: Positive for myalgias.   Neurological: Positive for headaches.   All other systems reviewed and are negative.      Allergies:  No known drug allergies     Medications:  The patient is not currently taking any prescribed medications.     Past Medical History:     Ectopic pregnancy    Social History:  The patient presents to the ED via EMS  PCP: Sarika Lucas     Physical Exam     Patient Vitals for the past 24 hrs:   BP Temp Temp src Pulse Resp SpO2   12/04/22 1654 112/75 97.4  F (36.3  C) Oral 84 20 96 %       Physical Exam  General: Adult female sitting upright  Eyes: PERRL, Conjunctive within normal limits  ENT: Right TM is partially opacified and erythematous.  Intact.  Left TM is mildly erythematous, otherwise normal in appearance.  Moist mucous membranes, oropharynx clear.  Mild posterior pharyngeal erythema.  No edema, exudate or lesions.  Hoarse voice, not muffled.  Neck: No palpable lymphadenopathy.  No rigidity.  CV: Normal S1S2, no murmur, rub or  gallop. Regular rate and rhythm  Resp: Clear to auscultation bilaterally, no wheezes, rales or rhonchi. Normal respiratory effort.  GI: Abdomen is soft, nontender and nondistended. No palpable masses. No rebound or guarding.  MSK: No edema. Nontender. Normal active range of motion.  Skin: Warm and dry. No rashes or lesions or ecchymoses on visible skin.  Neuro: Alert and oriented. Responds appropriately to all questions and commands. No focal findings appreciated. Normal muscle tone.  Psych: Normal mood and affect. Pleasant.    Emergency Department Course   Laboratory:  Labs Ordered and Resulted from Time of ED Arrival to Time of ED Departure   INFLUENZA A/B & SARS-COV2 PCR MULTIPLEX - Abnormal       Result Value    Influenza A PCR Positive (*)     Influenza B PCR Negative      RSV PCR Negative      SARS CoV2 PCR Negative     STREPTOCOCCUS A RAPID SCREEN W REFELX TO PCR - Normal    Group A Strep antigen Negative     GROUP A STREPTOCOCCUS PCR THROAT SWAB        Emergency Department Course:  Reviewed:  I reviewed nursing notes, vitals, past medical history and Care Everywhere    Assessments:  1932 I obtained history and examined the patient as noted above.   2124 I rechecked the patient and explained findings.     Interventions:  1701 Tylenol, 975 mg, oral   1701 Advil, 600 mg, oral   2033 Zofran, 4 mg, IV  2033 Decadron, 10 mg, oral  2033 Oxycodone, 5 mg, oral    Disposition:  The patient was discharged to home.     Impression & Plan   Medical Decision Making:    Katelyn Baltazar is a 27 year old female who presents for evaluation of right ear pain in the setting of cough and fever.  They also have myalgias. This is consistent with an influenza.  The patient is out of treatment window for influenza.  She has otitis media noted of the right ear will be treated with Augmentin.  She is at risk for pneumonia but no signs of this are detected on today's visit.  He was taking p.o. here without significant difficulty.   She does not appear to be significantly dehydrated.  Close followup of primary care physician is indicated and return to the ED for worsening of symptoms including neck swelling, inability to swallow including secretions, increasing productive cough, shortness of breath, or confusion.  She felt comfortable's plan.  All questions were answered prior to discharge.    Diagnosis:    ICD-10-CM    1. Influenza A  J10.1       2. Acute right otitis media  H66.91           Discharge Medications:  Discharge Medication List as of 12/4/2022 10:00 PM      START taking these medications    Details   amoxicillin-clavulanate (AUGMENTIN) 875-125 MG tablet Take 1 tablet by mouth 2 times daily for 7 days, Disp-14 tablet, R-0, E-Prescribe             Scribe Disclosure:  I, Raj Cooley, am serving as a scribe at 7:29 PM on 12/4/2022 to document services personally performed by Chasidy Ocasio MD based on my observations and the provider's statements to me.          Chasidy Ocasio MD  12/04/22 6996

## 2023-01-26 ENCOUNTER — APPOINTMENT (OUTPATIENT)
Dept: GENERAL RADIOLOGY | Facility: CLINIC | Age: 28
End: 2023-01-26
Attending: EMERGENCY MEDICINE
Payer: COMMERCIAL

## 2023-01-26 ENCOUNTER — HOSPITAL ENCOUNTER (OUTPATIENT)
Facility: CLINIC | Age: 28
Setting detail: OBSERVATION
Discharge: HOME | End: 2023-01-28
Attending: EMERGENCY MEDICINE | Admitting: INTERNAL MEDICINE
Payer: COMMERCIAL
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
## 2023-01-26 DIAGNOSIS — S82.851A CLOSED TRIMALLEOLAR FRACTURE OF RIGHT ANKLE, INITIAL ENCOUNTER: ICD-10-CM

## 2023-01-26 DIAGNOSIS — S82.851A CLOSED DISPLACED TRIMALLEOLAR FRACTURE OF RIGHT ANKLE, INITIAL ENCOUNTER: Primary | ICD-10-CM

## 2023-01-26 DIAGNOSIS — S93.04XA ANKLE DISLOCATION, RIGHT, INITIAL ENCOUNTER: ICD-10-CM

## 2023-01-26 PROCEDURE — 250N000011 HC RX IP 250 OP 636: Performed by: EMERGENCY MEDICINE

## 2023-01-26 PROCEDURE — 96374 THER/PROPH/DIAG INJ IV PUSH: CPT | Mod: 59

## 2023-01-26 PROCEDURE — 73620 X-RAY EXAM OF FOOT: CPT | Mod: RT

## 2023-01-26 PROCEDURE — 96375 TX/PRO/DX INJ NEW DRUG ADDON: CPT | Mod: XU

## 2023-01-26 PROCEDURE — 96376 TX/PRO/DX INJ SAME DRUG ADON: CPT

## 2023-01-26 PROCEDURE — 999N000157 HC STATISTIC RCP TIME EA 10 MIN

## 2023-01-26 PROCEDURE — 99285 EMERGENCY DEPT VISIT HI MDM: CPT | Mod: 25

## 2023-01-26 PROCEDURE — 999N000065 XR ANKLE RIGHT 2 VIEWS: Mod: RT

## 2023-01-26 PROCEDURE — 27818 TREATMENT OF ANKLE FRACTURE: CPT | Mod: RT

## 2023-01-26 PROCEDURE — 73590 X-RAY EXAM OF LOWER LEG: CPT | Mod: RT

## 2023-01-26 RX ORDER — PROPOFOL 10 MG/ML
INJECTION, EMULSION INTRAVENOUS
Status: DISCONTINUED
Start: 2023-01-26 | End: 2023-01-26 | Stop reason: WASHOUT

## 2023-01-26 RX ORDER — LORAZEPAM 2 MG/ML
0.5 INJECTION INTRAMUSCULAR
Status: COMPLETED | OUTPATIENT
Start: 2023-01-26 | End: 2023-01-26

## 2023-01-26 RX ORDER — FLUMAZENIL 0.1 MG/ML
0.2 INJECTION, SOLUTION INTRAVENOUS
Status: ACTIVE | OUTPATIENT
Start: 2023-01-26 | End: 2023-01-27

## 2023-01-26 RX ORDER — PROPOFOL 10 MG/ML
1 INJECTION, EMULSION INTRAVENOUS ONCE
Status: COMPLETED | OUTPATIENT
Start: 2023-01-26 | End: 2023-01-26

## 2023-01-26 RX ORDER — PROPOFOL 10 MG/ML
INJECTION, EMULSION INTRAVENOUS
Status: DISCONTINUED
Start: 2023-01-26 | End: 2023-01-27 | Stop reason: HOSPADM

## 2023-01-26 RX ORDER — KETOROLAC TROMETHAMINE 15 MG/ML
15 INJECTION, SOLUTION INTRAMUSCULAR; INTRAVENOUS ONCE
Status: COMPLETED | OUTPATIENT
Start: 2023-01-26 | End: 2023-01-26

## 2023-01-26 RX ORDER — FENTANYL CITRATE 50 UG/ML
50 INJECTION, SOLUTION INTRAMUSCULAR; INTRAVENOUS ONCE
Status: COMPLETED | OUTPATIENT
Start: 2023-01-26 | End: 2023-01-26

## 2023-01-26 RX ORDER — FENTANYL CITRATE 50 UG/ML
25 INJECTION, SOLUTION INTRAMUSCULAR; INTRAVENOUS
Status: DISCONTINUED | OUTPATIENT
Start: 2023-01-26 | End: 2023-01-27 | Stop reason: ALTCHOICE

## 2023-01-26 RX ORDER — FENTANYL CITRATE 50 UG/ML
50 INJECTION, SOLUTION INTRAMUSCULAR; INTRAVENOUS
Status: DISCONTINUED | OUTPATIENT
Start: 2023-01-26 | End: 2023-01-27 | Stop reason: ALTCHOICE

## 2023-01-26 RX ADMIN — FENTANYL CITRATE 25 MCG: 0.05 INJECTION, SOLUTION INTRAMUSCULAR; INTRAVENOUS at 22:00

## 2023-01-26 RX ADMIN — LORAZEPAM 0.5 MG: 2 INJECTION INTRAMUSCULAR; INTRAVENOUS at 22:36

## 2023-01-26 RX ADMIN — FENTANYL CITRATE 50 MCG: 0.05 INJECTION, SOLUTION INTRAMUSCULAR; INTRAVENOUS at 23:08

## 2023-01-26 RX ADMIN — PROPOFOL 100 MG: 10 INJECTION, EMULSION INTRAVENOUS at 23:25

## 2023-01-26 RX ADMIN — KETOROLAC TROMETHAMINE 15 MG: 15 INJECTION, SOLUTION INTRAMUSCULAR; INTRAVENOUS at 21:59

## 2023-01-26 ASSESSMENT — ENCOUNTER SYMPTOMS
MYALGIAS: 1
ARTHRALGIAS: 1

## 2023-01-26 ASSESSMENT — ACTIVITIES OF DAILY LIVING (ADL): ADLS_ACUITY_SCORE: 35

## 2023-01-27 ENCOUNTER — ANESTHESIA (OUTPATIENT)
Dept: SURGERY | Facility: CLINIC | Age: 28
End: 2023-01-27
Payer: COMMERCIAL

## 2023-01-27 ENCOUNTER — APPOINTMENT (OUTPATIENT)
Dept: GENERAL RADIOLOGY | Facility: CLINIC | Age: 28
End: 2023-01-27
Attending: STUDENT IN AN ORGANIZED HEALTH CARE EDUCATION/TRAINING PROGRAM
Payer: COMMERCIAL

## 2023-01-27 ENCOUNTER — APPOINTMENT (OUTPATIENT)
Dept: PHYSICAL THERAPY | Facility: CLINIC | Age: 28
End: 2023-01-27
Attending: INTERNAL MEDICINE
Payer: COMMERCIAL

## 2023-01-27 ENCOUNTER — ANESTHESIA EVENT (OUTPATIENT)
Dept: SURGERY | Facility: CLINIC | Age: 28
End: 2023-01-27
Payer: COMMERCIAL

## 2023-01-27 PROCEDURE — 258N000003 HC RX IP 258 OP 636: Performed by: ANESTHESIOLOGY

## 2023-01-27 PROCEDURE — 250N000009 HC RX 250: Performed by: STUDENT IN AN ORGANIZED HEALTH CARE EDUCATION/TRAINING PROGRAM

## 2023-01-27 PROCEDURE — 272N000001 HC OR GENERAL SUPPLY STERILE: Performed by: STUDENT IN AN ORGANIZED HEALTH CARE EDUCATION/TRAINING PROGRAM

## 2023-01-27 PROCEDURE — 250N000011 HC RX IP 250 OP 636: Performed by: NURSE ANESTHETIST, CERTIFIED REGISTERED

## 2023-01-27 PROCEDURE — 250N000009 HC RX 250: Performed by: NURSE ANESTHETIST, CERTIFIED REGISTERED

## 2023-01-27 PROCEDURE — 96375 TX/PRO/DX INJ NEW DRUG ADDON: CPT

## 2023-01-27 PROCEDURE — 250N000013 HC RX MED GY IP 250 OP 250 PS 637: Performed by: INTERNAL MEDICINE

## 2023-01-27 PROCEDURE — 999N000141 HC STATISTIC PRE-PROCEDURE NURSING ASSESSMENT: Performed by: STUDENT IN AN ORGANIZED HEALTH CARE EDUCATION/TRAINING PROGRAM

## 2023-01-27 PROCEDURE — 250N000025 HC SEVOFLURANE, PER MIN: Performed by: STUDENT IN AN ORGANIZED HEALTH CARE EDUCATION/TRAINING PROGRAM

## 2023-01-27 PROCEDURE — C1769 GUIDE WIRE: HCPCS | Performed by: STUDENT IN AN ORGANIZED HEALTH CARE EDUCATION/TRAINING PROGRAM

## 2023-01-27 PROCEDURE — 97530 THERAPEUTIC ACTIVITIES: CPT | Mod: GP | Performed by: PHYSICAL THERAPIST

## 2023-01-27 PROCEDURE — 710N000009 HC RECOVERY PHASE 1, LEVEL 1, PER MIN: Performed by: STUDENT IN AN ORGANIZED HEALTH CARE EDUCATION/TRAINING PROGRAM

## 2023-01-27 PROCEDURE — 250N000013 HC RX MED GY IP 250 OP 250 PS 637: Performed by: ANESTHESIOLOGY

## 2023-01-27 PROCEDURE — 250N000013 HC RX MED GY IP 250 OP 250 PS 637: Performed by: STUDENT IN AN ORGANIZED HEALTH CARE EDUCATION/TRAINING PROGRAM

## 2023-01-27 PROCEDURE — G0378 HOSPITAL OBSERVATION PER HR: HCPCS

## 2023-01-27 PROCEDURE — 99222 1ST HOSP IP/OBS MODERATE 55: CPT | Performed by: INTERNAL MEDICINE

## 2023-01-27 PROCEDURE — C1713 ANCHOR/SCREW BN/BN,TIS/BN: HCPCS | Performed by: STUDENT IN AN ORGANIZED HEALTH CARE EDUCATION/TRAINING PROGRAM

## 2023-01-27 PROCEDURE — 370N000017 HC ANESTHESIA TECHNICAL FEE, PER MIN: Performed by: STUDENT IN AN ORGANIZED HEALTH CARE EDUCATION/TRAINING PROGRAM

## 2023-01-27 PROCEDURE — 999N000179 XR SURGERY CARM FLUORO LESS THAN 5 MIN W STILLS: Mod: TC

## 2023-01-27 PROCEDURE — 250N000011 HC RX IP 250 OP 636: Performed by: STUDENT IN AN ORGANIZED HEALTH CARE EDUCATION/TRAINING PROGRAM

## 2023-01-27 PROCEDURE — 97116 GAIT TRAINING THERAPY: CPT | Mod: GP | Performed by: PHYSICAL THERAPIST

## 2023-01-27 PROCEDURE — 250N000011 HC RX IP 250 OP 636: Performed by: ANESTHESIOLOGY

## 2023-01-27 PROCEDURE — 360N000084 HC SURGERY LEVEL 4 W/ FLUORO, PER MIN: Performed by: STUDENT IN AN ORGANIZED HEALTH CARE EDUCATION/TRAINING PROGRAM

## 2023-01-27 PROCEDURE — 99207 PR NO BILLABLE SERVICE THIS VISIT: CPT | Performed by: STUDENT IN AN ORGANIZED HEALTH CARE EDUCATION/TRAINING PROGRAM

## 2023-01-27 PROCEDURE — 97161 PT EVAL LOW COMPLEX 20 MIN: CPT | Mod: GP | Performed by: PHYSICAL THERAPIST

## 2023-01-27 PROCEDURE — 258N000003 HC RX IP 258 OP 636: Performed by: NURSE ANESTHETIST, CERTIFIED REGISTERED

## 2023-01-27 PROCEDURE — 999N000065 XR ANKLE PORT RIGHT G/E 3 VIEWS

## 2023-01-27 DEVICE — IMP SCR SYN CAN 4.0X50MM LONG THRD SS 207.750: Type: IMPLANTABLE DEVICE | Site: ANKLE | Status: FUNCTIONAL

## 2023-01-27 DEVICE — IMP SCR SYN CORTEX 2.7X16MM SELF TAP SS 202.816: Type: IMPLANTABLE DEVICE | Site: ANKLE | Status: FUNCTIONAL

## 2023-01-27 DEVICE — IMP SCR SYN CORTEX 3.5X12MM SELF TAP SS 204.812: Type: IMPLANTABLE DEVICE | Site: ANKLE | Status: FUNCTIONAL

## 2023-01-27 DEVICE — IMP SCR SYN CORTEX 2.7X18MM SELF TAP SS 202.818: Type: IMPLANTABLE DEVICE | Site: ANKLE | Status: FUNCTIONAL

## 2023-01-27 DEVICE — IMP SCR SYN CORTEX 3.5X10MM SELF TAP SS 204.810: Type: IMPLANTABLE DEVICE | Site: ANKLE | Status: FUNCTIONAL

## 2023-01-27 DEVICE — IMP SCR SYN CORTEX 3.5X14MM SELF TAP SS 204.814: Type: IMPLANTABLE DEVICE | Site: ANKLE | Status: FUNCTIONAL

## 2023-01-27 DEVICE — IMP SCR SYN CAN 4.0X12MM FT SS 206.012: Type: IMPLANTABLE DEVICE | Site: ANKLE | Status: FUNCTIONAL

## 2023-01-27 DEVICE — IMP PLATE SYN 1/3 TUBULAR 93MM 08H SS 241.381: Type: IMPLANTABLE DEVICE | Site: ANKLE | Status: FUNCTIONAL

## 2023-01-27 RX ORDER — FENTANYL CITRATE 50 UG/ML
INJECTION, SOLUTION INTRAMUSCULAR; INTRAVENOUS PRN
Status: DISCONTINUED | OUTPATIENT
Start: 2023-01-27 | End: 2023-01-27

## 2023-01-27 RX ORDER — OXYCODONE HYDROCHLORIDE 5 MG/1
5 TABLET ORAL EVERY 4 HOURS PRN
Status: DISCONTINUED | OUTPATIENT
Start: 2023-01-27 | End: 2023-01-28 | Stop reason: HOSPADM

## 2023-01-27 RX ORDER — IBUPROFEN 600 MG/1
600 TABLET, FILM COATED ORAL EVERY 6 HOURS PRN
Status: DISCONTINUED | OUTPATIENT
Start: 2023-01-27 | End: 2023-01-28 | Stop reason: HOSPADM

## 2023-01-27 RX ORDER — ONDANSETRON 4 MG/1
4 TABLET, ORALLY DISINTEGRATING ORAL EVERY 30 MIN PRN
Status: DISCONTINUED | OUTPATIENT
Start: 2023-01-27 | End: 2023-01-27 | Stop reason: HOSPADM

## 2023-01-27 RX ORDER — ACETAMINOPHEN 325 MG/1
975 TABLET ORAL EVERY 8 HOURS
Status: DISCONTINUED | OUTPATIENT
Start: 2023-01-27 | End: 2023-01-28 | Stop reason: HOSPADM

## 2023-01-27 RX ORDER — SODIUM CHLORIDE, SODIUM LACTATE, POTASSIUM CHLORIDE, CALCIUM CHLORIDE 600; 310; 30; 20 MG/100ML; MG/100ML; MG/100ML; MG/100ML
INJECTION, SOLUTION INTRAVENOUS CONTINUOUS
Status: DISCONTINUED | OUTPATIENT
Start: 2023-01-27 | End: 2023-01-27 | Stop reason: HOSPADM

## 2023-01-27 RX ORDER — ACETAMINOPHEN 325 MG/1
975 TABLET ORAL ONCE
Status: COMPLETED | OUTPATIENT
Start: 2023-01-27 | End: 2023-01-27

## 2023-01-27 RX ORDER — LABETALOL HYDROCHLORIDE 5 MG/ML
10 INJECTION, SOLUTION INTRAVENOUS
Status: DISCONTINUED | OUTPATIENT
Start: 2023-01-27 | End: 2023-01-27 | Stop reason: HOSPADM

## 2023-01-27 RX ORDER — IBUPROFEN 600 MG/1
600 TABLET, FILM COATED ORAL EVERY 6 HOURS PRN
Status: DISCONTINUED | OUTPATIENT
Start: 2023-01-27 | End: 2023-01-27 | Stop reason: ALTCHOICE

## 2023-01-27 RX ORDER — PROCHLORPERAZINE MALEATE 5 MG
10 TABLET ORAL EVERY 6 HOURS PRN
Status: DISCONTINUED | OUTPATIENT
Start: 2023-01-27 | End: 2023-01-28 | Stop reason: HOSPADM

## 2023-01-27 RX ORDER — ONDANSETRON 4 MG/1
4 TABLET, ORALLY DISINTEGRATING ORAL EVERY 6 HOURS PRN
Qty: 10 TABLET | Refills: 0 | Status: SHIPPED | OUTPATIENT
Start: 2023-01-27 | End: 2023-01-28

## 2023-01-27 RX ORDER — ONDANSETRON 4 MG/1
4 TABLET, ORALLY DISINTEGRATING ORAL EVERY 6 HOURS PRN
Status: DISCONTINUED | OUTPATIENT
Start: 2023-01-27 | End: 2023-01-27 | Stop reason: ALTCHOICE

## 2023-01-27 RX ORDER — BISACODYL 10 MG
10 SUPPOSITORY, RECTAL RECTAL DAILY PRN
Status: DISCONTINUED | OUTPATIENT
Start: 2023-01-27 | End: 2023-01-28 | Stop reason: HOSPADM

## 2023-01-27 RX ORDER — ASPIRIN 325 MG
325 TABLET, DELAYED RELEASE (ENTERIC COATED) ORAL DAILY
Qty: 42 TABLET | Refills: 0 | Status: SHIPPED | OUTPATIENT
Start: 2023-01-28 | End: 2023-03-11

## 2023-01-27 RX ORDER — PROPOFOL 10 MG/ML
INJECTION, EMULSION INTRAVENOUS PRN
Status: DISCONTINUED | OUTPATIENT
Start: 2023-01-27 | End: 2023-01-27

## 2023-01-27 RX ORDER — NALOXONE HYDROCHLORIDE 0.4 MG/ML
0.2 INJECTION, SOLUTION INTRAMUSCULAR; INTRAVENOUS; SUBCUTANEOUS
Status: DISCONTINUED | OUTPATIENT
Start: 2023-01-27 | End: 2023-01-28 | Stop reason: HOSPADM

## 2023-01-27 RX ORDER — POLYETHYLENE GLYCOL 3350 17 G/17G
17 POWDER, FOR SOLUTION ORAL DAILY
Qty: 510 G | Refills: 0 | Status: SHIPPED | OUTPATIENT
Start: 2023-01-27

## 2023-01-27 RX ORDER — HYDROMORPHONE HCL IN WATER/PF 6 MG/30 ML
0.2 PATIENT CONTROLLED ANALGESIA SYRINGE INTRAVENOUS EVERY 5 MIN PRN
Status: DISCONTINUED | OUTPATIENT
Start: 2023-01-27 | End: 2023-01-27 | Stop reason: HOSPADM

## 2023-01-27 RX ORDER — AMOXICILLIN 250 MG
1 CAPSULE ORAL 2 TIMES DAILY
Status: DISCONTINUED | OUTPATIENT
Start: 2023-01-27 | End: 2023-01-28 | Stop reason: HOSPADM

## 2023-01-27 RX ORDER — ACETAMINOPHEN 325 MG/1
650 TABLET ORAL EVERY 4 HOURS PRN
Status: DISCONTINUED | OUTPATIENT
Start: 2023-01-30 | End: 2023-01-28 | Stop reason: HOSPADM

## 2023-01-27 RX ORDER — HYDROMORPHONE HCL IN WATER/PF 6 MG/30 ML
0.2 PATIENT CONTROLLED ANALGESIA SYRINGE INTRAVENOUS
Status: DISCONTINUED | OUTPATIENT
Start: 2023-01-27 | End: 2023-01-27 | Stop reason: ALTCHOICE

## 2023-01-27 RX ORDER — DIPHENHYDRAMINE HCL 25 MG
25 CAPSULE ORAL EVERY 6 HOURS PRN
Status: DISCONTINUED | OUTPATIENT
Start: 2023-01-27 | End: 2023-01-28 | Stop reason: HOSPADM

## 2023-01-27 RX ORDER — HYDROMORPHONE HCL IN WATER/PF 6 MG/30 ML
0.4 PATIENT CONTROLLED ANALGESIA SYRINGE INTRAVENOUS EVERY 5 MIN PRN
Status: DISCONTINUED | OUTPATIENT
Start: 2023-01-27 | End: 2023-01-27 | Stop reason: HOSPADM

## 2023-01-27 RX ORDER — HYDROMORPHONE HCL IN WATER/PF 6 MG/30 ML
0.2 PATIENT CONTROLLED ANALGESIA SYRINGE INTRAVENOUS
Status: DISCONTINUED | OUTPATIENT
Start: 2023-01-27 | End: 2023-01-28

## 2023-01-27 RX ORDER — CEFAZOLIN SODIUM 2 G/100ML
2 INJECTION, SOLUTION INTRAVENOUS EVERY 8 HOURS
Status: COMPLETED | OUTPATIENT
Start: 2023-01-27 | End: 2023-01-27

## 2023-01-27 RX ORDER — LIDOCAINE HYDROCHLORIDE 10 MG/ML
INJECTION, SOLUTION INFILTRATION; PERINEURAL PRN
Status: DISCONTINUED | OUTPATIENT
Start: 2023-01-27 | End: 2023-01-27

## 2023-01-27 RX ORDER — ACETAMINOPHEN 325 MG/1
975 TABLET ORAL EVERY 8 HOURS
Status: DISCONTINUED | OUTPATIENT
Start: 2023-01-27 | End: 2023-01-27 | Stop reason: ALTCHOICE

## 2023-01-27 RX ORDER — OXYCODONE HYDROCHLORIDE 10 MG/1
10 TABLET ORAL EVERY 4 HOURS PRN
Status: DISCONTINUED | OUTPATIENT
Start: 2023-01-27 | End: 2023-01-28 | Stop reason: HOSPADM

## 2023-01-27 RX ORDER — OXYCODONE HYDROCHLORIDE 5 MG/1
5 TABLET ORAL EVERY 4 HOURS PRN
Qty: 30 TABLET | Refills: 0 | Status: SHIPPED | OUTPATIENT
Start: 2023-01-27 | End: 2023-02-06

## 2023-01-27 RX ORDER — FENTANYL CITRATE 50 UG/ML
50 INJECTION, SOLUTION INTRAMUSCULAR; INTRAVENOUS EVERY 5 MIN PRN
Status: DISCONTINUED | OUTPATIENT
Start: 2023-01-27 | End: 2023-01-27 | Stop reason: HOSPADM

## 2023-01-27 RX ORDER — NALOXONE HYDROCHLORIDE 0.4 MG/ML
0.4 INJECTION, SOLUTION INTRAMUSCULAR; INTRAVENOUS; SUBCUTANEOUS
Status: DISCONTINUED | OUTPATIENT
Start: 2023-01-27 | End: 2023-01-28 | Stop reason: HOSPADM

## 2023-01-27 RX ORDER — CEFAZOLIN SODIUM/WATER 2 G/20 ML
2 SYRINGE (ML) INTRAVENOUS SEE ADMIN INSTRUCTIONS
Status: DISCONTINUED | OUTPATIENT
Start: 2023-01-27 | End: 2023-01-27 | Stop reason: HOSPADM

## 2023-01-27 RX ORDER — GLYCOPYRROLATE 0.2 MG/ML
INJECTION, SOLUTION INTRAMUSCULAR; INTRAVENOUS PRN
Status: DISCONTINUED | OUTPATIENT
Start: 2023-01-27 | End: 2023-01-27

## 2023-01-27 RX ORDER — ONDANSETRON 2 MG/ML
4 INJECTION INTRAMUSCULAR; INTRAVENOUS EVERY 30 MIN PRN
Status: DISCONTINUED | OUTPATIENT
Start: 2023-01-27 | End: 2023-01-27 | Stop reason: HOSPADM

## 2023-01-27 RX ORDER — DEXAMETHASONE SODIUM PHOSPHATE 4 MG/ML
INJECTION, SOLUTION INTRA-ARTICULAR; INTRALESIONAL; INTRAMUSCULAR; INTRAVENOUS; SOFT TISSUE PRN
Status: DISCONTINUED | OUTPATIENT
Start: 2023-01-27 | End: 2023-01-27

## 2023-01-27 RX ORDER — ONDANSETRON 2 MG/ML
4 INJECTION INTRAMUSCULAR; INTRAVENOUS EVERY 6 HOURS PRN
Status: DISCONTINUED | OUTPATIENT
Start: 2023-01-27 | End: 2023-01-27 | Stop reason: ALTCHOICE

## 2023-01-27 RX ORDER — HYDROMORPHONE HCL IN WATER/PF 6 MG/30 ML
0.4 PATIENT CONTROLLED ANALGESIA SYRINGE INTRAVENOUS
Status: DISCONTINUED | OUTPATIENT
Start: 2023-01-27 | End: 2023-01-28 | Stop reason: HOSPADM

## 2023-01-27 RX ORDER — ONDANSETRON 4 MG/1
4 TABLET, ORALLY DISINTEGRATING ORAL EVERY 6 HOURS PRN
Status: DISCONTINUED | OUTPATIENT
Start: 2023-01-27 | End: 2023-01-28 | Stop reason: HOSPADM

## 2023-01-27 RX ORDER — LIDOCAINE 40 MG/G
CREAM TOPICAL
Status: DISCONTINUED | OUTPATIENT
Start: 2023-01-27 | End: 2023-01-27 | Stop reason: HOSPADM

## 2023-01-27 RX ORDER — CEFAZOLIN SODIUM/WATER 2 G/20 ML
2 SYRINGE (ML) INTRAVENOUS
Status: COMPLETED | OUTPATIENT
Start: 2023-01-27 | End: 2023-01-27

## 2023-01-27 RX ORDER — AMOXICILLIN 250 MG
2 CAPSULE ORAL 2 TIMES DAILY PRN
Status: DISCONTINUED | OUTPATIENT
Start: 2023-01-27 | End: 2023-01-27 | Stop reason: ALTCHOICE

## 2023-01-27 RX ORDER — BUPIVACAINE HYDROCHLORIDE AND EPINEPHRINE 5; 5 MG/ML; UG/ML
INJECTION, SOLUTION PERINEURAL PRN
Status: DISCONTINUED | OUTPATIENT
Start: 2023-01-27 | End: 2023-01-27 | Stop reason: HOSPADM

## 2023-01-27 RX ORDER — ONDANSETRON 2 MG/ML
4 INJECTION INTRAMUSCULAR; INTRAVENOUS EVERY 6 HOURS PRN
Status: DISCONTINUED | OUTPATIENT
Start: 2023-01-27 | End: 2023-01-28 | Stop reason: HOSPADM

## 2023-01-27 RX ORDER — AMOXICILLIN 250 MG
1 CAPSULE ORAL 2 TIMES DAILY PRN
Qty: 14 TABLET | Refills: 0 | Status: SHIPPED | OUTPATIENT
Start: 2023-01-27

## 2023-01-27 RX ORDER — SODIUM CHLORIDE, SODIUM LACTATE, POTASSIUM CHLORIDE, CALCIUM CHLORIDE 600; 310; 30; 20 MG/100ML; MG/100ML; MG/100ML; MG/100ML
INJECTION, SOLUTION INTRAVENOUS CONTINUOUS
Status: DISCONTINUED | OUTPATIENT
Start: 2023-01-27 | End: 2023-01-28

## 2023-01-27 RX ORDER — ACETAMINOPHEN 325 MG/1
650 TABLET ORAL EVERY 6 HOURS PRN
Qty: 100 TABLET | Refills: 0 | Status: SHIPPED | OUTPATIENT
Start: 2023-01-27

## 2023-01-27 RX ORDER — ONDANSETRON 2 MG/ML
INJECTION INTRAMUSCULAR; INTRAVENOUS PRN
Status: DISCONTINUED | OUTPATIENT
Start: 2023-01-27 | End: 2023-01-27

## 2023-01-27 RX ORDER — AMOXICILLIN 250 MG
1 CAPSULE ORAL 2 TIMES DAILY PRN
Status: DISCONTINUED | OUTPATIENT
Start: 2023-01-27 | End: 2023-01-27 | Stop reason: ALTCHOICE

## 2023-01-27 RX ORDER — KETAMINE HYDROCHLORIDE 10 MG/ML
INJECTION INTRAMUSCULAR; INTRAVENOUS PRN
Status: DISCONTINUED | OUTPATIENT
Start: 2023-01-27 | End: 2023-01-27

## 2023-01-27 RX ORDER — FENTANYL CITRATE 50 UG/ML
25 INJECTION, SOLUTION INTRAMUSCULAR; INTRAVENOUS EVERY 5 MIN PRN
Status: DISCONTINUED | OUTPATIENT
Start: 2023-01-27 | End: 2023-01-27 | Stop reason: HOSPADM

## 2023-01-27 RX ORDER — POLYETHYLENE GLYCOL 3350 17 G/17G
17 POWDER, FOR SOLUTION ORAL DAILY
Status: DISCONTINUED | OUTPATIENT
Start: 2023-01-28 | End: 2023-01-28 | Stop reason: HOSPADM

## 2023-01-27 RX ORDER — LIDOCAINE 40 MG/G
CREAM TOPICAL
Status: DISCONTINUED | OUTPATIENT
Start: 2023-01-27 | End: 2023-01-28 | Stop reason: HOSPADM

## 2023-01-27 RX ORDER — MAGNESIUM HYDROXIDE 1200 MG/15ML
LIQUID ORAL PRN
Status: DISCONTINUED | OUTPATIENT
Start: 2023-01-27 | End: 2023-01-27 | Stop reason: HOSPADM

## 2023-01-27 RX ADMIN — ACETAMINOPHEN 975 MG: 325 TABLET, FILM COATED ORAL at 22:28

## 2023-01-27 RX ADMIN — GLYCOPYRROLATE 0.2 MG: 0.2 INJECTION, SOLUTION INTRAMUSCULAR; INTRAVENOUS at 07:56

## 2023-01-27 RX ADMIN — MIDAZOLAM 2 MG: 1 INJECTION INTRAMUSCULAR; INTRAVENOUS at 07:51

## 2023-01-27 RX ADMIN — SODIUM CHLORIDE, POTASSIUM CHLORIDE, SODIUM LACTATE AND CALCIUM CHLORIDE: 600; 310; 30; 20 INJECTION, SOLUTION INTRAVENOUS at 07:23

## 2023-01-27 RX ADMIN — DEXAMETHASONE SODIUM PHOSPHATE 8 MG: 4 INJECTION, SOLUTION INTRA-ARTICULAR; INTRALESIONAL; INTRAMUSCULAR; INTRAVENOUS; SOFT TISSUE at 07:56

## 2023-01-27 RX ADMIN — SENNOSIDES AND DOCUSATE SODIUM 1 TABLET: 50; 8.6 TABLET ORAL at 20:05

## 2023-01-27 RX ADMIN — FENTANYL CITRATE 100 MCG: 50 INJECTION, SOLUTION INTRAMUSCULAR; INTRAVENOUS at 07:56

## 2023-01-27 RX ADMIN — PROPOFOL 200 MG: 10 INJECTION, EMULSION INTRAVENOUS at 07:56

## 2023-01-27 RX ADMIN — HYDROMORPHONE HYDROCHLORIDE 1 MG: 1 INJECTION, SOLUTION INTRAMUSCULAR; INTRAVENOUS; SUBCUTANEOUS at 08:32

## 2023-01-27 RX ADMIN — ACETAMINOPHEN 975 MG: 325 TABLET, FILM COATED ORAL at 06:25

## 2023-01-27 RX ADMIN — CEFAZOLIN SODIUM 2 G: 2 INJECTION, SOLUTION INTRAVENOUS at 14:25

## 2023-01-27 RX ADMIN — ASPIRIN 325 MG: 325 TABLET, COATED ORAL at 14:03

## 2023-01-27 RX ADMIN — Medication 2 G: at 07:53

## 2023-01-27 RX ADMIN — CEFAZOLIN SODIUM 2 G: 2 INJECTION, SOLUTION INTRAVENOUS at 22:53

## 2023-01-27 RX ADMIN — LIDOCAINE HYDROCHLORIDE 30 MG: 10 INJECTION, SOLUTION INFILTRATION; PERINEURAL at 07:56

## 2023-01-27 RX ADMIN — OXYCODONE HYDROCHLORIDE 5 MG: 5 TABLET ORAL at 12:14

## 2023-01-27 RX ADMIN — ACETAMINOPHEN 975 MG: 325 TABLET, FILM COATED ORAL at 14:03

## 2023-01-27 RX ADMIN — DIPHENHYDRAMINE HYDROCHLORIDE 25 MG: 25 CAPSULE ORAL at 22:47

## 2023-01-27 RX ADMIN — OXYCODONE HYDROCHLORIDE 5 MG: 5 TABLET ORAL at 17:28

## 2023-01-27 RX ADMIN — ONDANSETRON 4 MG: 2 INJECTION INTRAMUSCULAR; INTRAVENOUS at 08:18

## 2023-01-27 RX ADMIN — DEXMEDETOMIDINE HYDROCHLORIDE 0.5 MCG/KG/HR: 100 INJECTION, SOLUTION INTRAVENOUS at 08:12

## 2023-01-27 RX ADMIN — ACETAMINOPHEN 975 MG: 325 TABLET, FILM COATED ORAL at 12:13

## 2023-01-27 RX ADMIN — IBUPROFEN 600 MG: 600 TABLET, FILM COATED ORAL at 20:05

## 2023-01-27 RX ADMIN — Medication 20 MG: at 08:15

## 2023-01-27 RX ADMIN — ONDANSETRON 4 MG: 2 INJECTION INTRAMUSCULAR; INTRAVENOUS at 10:41

## 2023-01-27 RX ADMIN — HYDROMORPHONE HYDROCHLORIDE 0.2 MG: 0.2 INJECTION, SOLUTION INTRAMUSCULAR; INTRAVENOUS; SUBCUTANEOUS at 20:05

## 2023-01-27 ASSESSMENT — ACTIVITIES OF DAILY LIVING (ADL)
ADLS_ACUITY_SCORE: 35

## 2023-01-27 NOTE — PHARMACY-ADMISSION MEDICATION HISTORY
Admission medication history interview status for this patient is complete. See [facility] admission navigator for allergy information, prior to admission medications and immunization status.     Medication history interview done, indicate source(s): Patient  Medication history resources (including written lists, pill bottles, clinic record):None  Pharmacy: -    Changes made to PTA medication list:  Added: -  Changed: -  Reported as Not Taking: ibuprofen, zofran  Removed: prenatal vit    Actions taken by pharmacist (provider contacted, etc):None     Additional medication history information:None    Medication reconciliation/reorder completed by provider prior to medication history?  yes   (Y/N)       Medication Affordability:        For patients on insulin therapy:   Do you use sliding scale insulin based on blood sugars?   What is your pre-meal insulin coverage?    Do you typically eat three meals a day?   How many times do you check your blood glucose per day?   How many episodes of hypoglycemia do you typically have per month?   Do you have a Continuous Glucose Monitor (CGM)?      Prior to Admission medications    Medication Sig Last Dose Taking? Auth Provider Long Term End Date   ibuprofen (ADVIL/MOTRIN) 600 MG tablet Take 1 tablet (600 mg) by mouth every 6 hours as needed for pain Take w/ food  Patient not taking: Reported on 1/27/2023 Not Taking  Marcus Evans MD     ondansetron (ZOFRAN ODT) 4 MG ODT tab Take 1 tablet (4 mg) by mouth every 8 hours as needed for nausea or vomiting  Patient not taking: Reported on 1/27/2023 Not Taking  Greyson Billy MD     order for DME Equipment being ordered: Maternity belt   Kristie Mathis MD

## 2023-01-27 NOTE — ED NOTES
[facility]  ED Nurse Handoff Report    Katelyn Baltazar is a 27 year old female   ED Chief complaint: Ankle Pain  . ED Diagnosis:   Final diagnoses:   Closed trimalleolar fracture of right ankle, initial encounter   Ankle dislocation, right, initial encounter   Fall, initial encounter     Allergies: No Known Allergies    Code Status: Full Code  Activity level - Baseline/Home:  Independent. Activity Level - Current:   Total Care. Lift room needed: No. Bariatric: No   Needed: YES   Isolation: No. Infection: Not Applicable.     Vital Signs:   Vitals:    01/26/23 2330 01/26/23 2330 01/27/23 0151 01/27/23 0152   BP: 97/57  116/64    Pulse: 91 84 78    Resp:       Temp:       TempSrc:       SpO2: 100%  100% 100%   Weight:           Cardiac Rhythm:  ,      Pain level:    Patient confused: No. Patient Falls Risk: Yes.   Elimination Status: Has voided   Patient Report - Initial Complaint: Patient arrives with [facility]- patient was outside walking, slipped and fell while twisting her right ankle. Patient c/o right medial and dorsal of right ankle. Medics gave 400mg of Ibuprofen en route.  Focused Assessment:  Reduction adequate on post reduction x-ray.  Patient resting comfortably but has no ride home and does not feel like she can negotiate crutches use do to pain. Will admit to observation for pain control and orthopedic consultation in the morning.      Diagnosis:      Closed trimalleolar fracture of right ankle, initial encounter      Ankle dislocation, right, initial encounter      Fall, initial encounter  Tests Performed:   XR Ankle Right 2 Views   Final Result   IMPRESSION: Interval reduction of a trimalleolar right ankle fracture. Possible additional extensor digitorum brevis avulsion fracture. Overlying splint material obscures osseous detail.      XR Foot Right 2 Views   Final Result   IMPRESSION: Comminuted, displaced fracture distal fibula. Mildly displaced fracture posterior  malleolus and displaced fracture medial malleolus. Widening of ankle mortise with medial displacement of the distal tibia relative to the talus. Soft tissue    edema. No visible fracture of the foot.      XR Tibia and Fibula Right 2 Views   Final Result   IMPRESSION: Comminuted, displaced fracture distal fibula. Mildly displaced fracture posterior malleolus and displaced fracture medial malleolus. Widening of ankle mortise with medial displacement of the distal tibia relative to the talus. Soft tissue    edema. No visible fracture of the foot.        . Abnormal Results: Labs Ordered and Resulted from Time of ED Arrival to Time of ED Departure - No data to display  .   Treatments provided: pain control  Right lower leg splint  Family Comments: per patient has cell phone  OBS brochure/video discussed/provided to patient:  Yes  ED Medications:   Medications   fentaNYL (PF) (SUBLIMAZE) injection 25 mcg (25 mcg Intravenous Given 1/26/23 2200)   flumazenil (ROMAZICON) injection 0.2 mg (has no administration in time range)   fentaNYL (PF) (SUBLIMAZE) injection 50 mcg (has no administration in time range)   propofol (DIPRIVAN) 200 MG/20ML injection (has no administration in time range)   ketorolac (TORADOL) injection 15 mg (15 mg Intravenous Given 1/26/23 2159)   LORazepam (ATIVAN) injection 0.5 mg (0.5 mg Intravenous Given 1/26/23 2236)   fentaNYL (PF) (SUBLIMAZE) injection 50 mcg (50 mcg Intravenous Given 1/26/23 2308)   propofol (DIPRIVAN) injection 10 mg/mL vial (100 mg Intravenous Given 1/26/23 2325)     Drips infusing:  No  For the majority of the shift, the patient's behavior Green. Interventions performed were support and reassurance, .    Sepsis treatment initiated: No     Patient tested for COVID 19 prior to admission: NO    ED Nurse Name/Phone Number: Anel Rosas RN,   2:00 AM

## 2023-01-27 NOTE — CONSULTS
[facility]    Orthopedics Consultation    Date of Admission:  1/26/2023    Assessment & Plan     PLAN:     Katelyn Baltazar  is a 27 year old female who presents with a right ankle fracture after slipping on ice while walking to her car.  X-rays revealed a trimalleolar ankle fracture.  She is neurovascular intact.  We discussed possible treatment options including nonoperative and operative fixation along with the risks and benefits.  After thorough discussion, I recommended surgery to improve the alignment and function of her ankle.  She was in agreement and elected to proceed.  A  was utilized for the history, physical exam, discussion regarding possible treatment.    Plan for a right ankle ORIF  N.p.o. 4 OR  Discussed likely discharge later today following surgery    Active Problems:    * No active hospital problems. *    NATHALY TODD MD     Code Status    Full Code    Reason for Consult   Reason for consult: Right ankle fracture    Primary Care Physician   [facility]    Chief Complaint   Right ankle pain    History is obtained from the patient via [place] interperter    History of Present Illness   Katelyn Baltazar is a 27 year old female who presents with a right ankle fracture after a fall while walking to her car.  She reports she slipped on ice.  She had immediate right ankle pain and presented to the [facility] emergency department where x-rays were obtained and revealed her ankle fracture.  She was placed in a splint in brought in for pain control.  Orthopedic surgery was consulted.  She denies any prior injury.  No numbness or paresthesias.    Past Medical History   I have reviewed this patient's medical history and updated it with pertinent information if needed.   Past Medical History:   Diagnosis Date     NO ACTIVE PROBLEMS      Supervision of normal pregnancy 12/04/2018       Past Surgical History   I have reviewed this patient's  surgical history and updated it with pertinent information if needed.  Past Surgical History:   Procedure Laterality Date     NO HISTORY OF SURGERY         Prior to Admission Medications   Prior to Admission Medications   Prescriptions Last Dose Informant Patient Reported? Taking?   Prenatal Vit-Fe Fumarate-FA (PRENATAL MULTIVITAMIN W/IRON) 27-0.8 MG tablet   Yes No   Sig: Take 1 tablet by mouth daily   ibuprofen (ADVIL/MOTRIN) 600 MG tablet   No No   Sig: Take 1 tablet (600 mg) by mouth every 6 hours as needed for pain Take w/ food   ondansetron (ZOFRAN ODT) 4 MG ODT tab   No No   Sig: Take 1 tablet (4 mg) by mouth every 8 hours as needed for nausea or vomiting   order for DME   No No   Sig: Equipment being ordered: Maternity belt   Patient not taking: Reported on 5/30/2019      Facility-Administered Medications: None     Allergies   No Known Allergies    Social History   I have reviewed this patient's social history and updated it with pertinent information if needed. Katelyn Baltazar  reports that she has never smoked. She has never used smokeless tobacco. She reports that she does not drink alcohol and does not use drugs.    Family History   I have reviewed this patient's family history and updated it with pertinent information if needed.   History reviewed. No pertinent family history.    Review of Systems   The 10 point Review of Systems is negative other than noted in the HPI or here.     Physical Exam   Temp: 97.5  F (36.4  C) Temp src: Temporal BP: 111/59 Pulse: 69   Resp: 20 SpO2: 100 % O2 Device: None (Room air)    Vital Signs with Ranges  Temp:  [97.4  F (36.3  C)-97.5  F (36.4  C)] 97.5  F (36.4  C)  Pulse:  [58-99] 69  Resp:  [20-26] 20  BP: ()/(36-71) 111/59  SpO2:  [98 %-100 %] 100 %  187 lbs 13.31 oz    Constitutional: awake, alert, cooperative, no apparent distress, and appears stated age  Eyes: extra-ocular muscles intact, no scleral icterus  ENT: normocepalic, atraumatic without obvious  abnormality  Respiratory: no increased work of breathing, symmetric chest wall expansion    MSK:  Right lower extremity:  Splint. Diffuse swelling around the ankle  Tenderness to palpation over the ankle  No hip pain with logroll  Ankle range of motion limited secondary to pain  Sensations intact to light touch in the superficial peroneal, deep peroneal, tibial nerve distributions  FHL, EHL, dorsiflexion, plantarflexion intact  Dorsalis pedis pulse 2+, good capillary refill    Left lower extremity:  Skin is intact.  No tenderness to palpation over the long bones or joints.  No pain with range of motion of the hip, knee, ankle  Sensations intact to light touch in the superficial peroneal, deep peroneal, tibial nerve distributions  FHL, EHL, dorsiflexion, plantarflexion intact  Dorsalis pedis pulse 2+, good capillary refill    Data   Most Recent 3 CBC's:Recent Labs   Lab Test 11/17/22  0748 01/27/20  1210 11/18/19  2203   WBC 9.1 9.6 10.6   HGB 13.6 11.5* 11.3*   MCV 91 86 88    255 285     Most Recent 3 BMP's:Recent Labs   Lab Test 11/17/22  0748 11/18/19  2203 10/21/18  2134    138 135   POTASSIUM 3.6 3.5 3.8   CHLORIDE 103 108 105   CO2 25 24 22   BUN 20.9* 8 7   CR 0.85 0.48* 0.53   ANIONGAP 12 6 8   SHAHRZAD 9.5 8.7 8.6   GLC 81 72 99     Most Recent 3 INR's:No lab results found.

## 2023-01-27 NOTE — PROGRESS NOTES
01/27/23 1600   Appointment Info   Signing Clinician's Name / Credentials (PT) Jennifer Hyatt DPT   Rehab Comments (PT) NWB R ankle   Quick Adds   Quick Adds Certification   Living Environment   People in Home spouse  ( in [place] until on Sunday)   Current Living Arrangements apartment   Home Accessibility stairs to enter home   Number of Stairs, Main Entrance greater than 10 stairs   Stair Railings, Main Entrance railings safe and in good condition   Transportation Anticipated family or friend will provide   Living Environment Comments Pt lives in apartment with 4 kids including ages 8,7,3,2 years old; spouse currently in [place] until Sunday. Mother-in-law staying with the 4 children while pt in hospital. Pt has no ADs.   Self-Care   Usual Activity Tolerance excellent   Current Activity Tolerance fair   Regular Exercise No   Equipment Currently Used at Home none   Fall history within last six months yes   Number of times patient has fallen within last six months 1   Activity/Exercise/Self-Care Comment Normally ind with all ADLs/mobility, works and has 4 kids   General Information   Onset of Illness/Injury or Date of Surgery 01/26/23   Referring Physician Denis Boudreaux MD   Patient/Family Therapy Goals Statement (PT) return home   Pertinent History of Current Problem (include personal factors and/or comorbidities that impact the POC) 27 year old female who presents with a right ankle fracture after slipping on ice while walking to her car.  X-rays revealed a trimalleolar ankle fracture s/p ORIF   Existing Precautions/Restrictions fall   Weight-Bearing Status - LLE nonweight-bearing   Cognition   Affect/Mental Status (Cognition) WFL   Orientation Status (Cognition) oriented x 4   Pain Assessment   Patient Currently in Pain Yes, see Vital Sign flowsheet  (pain R ankle)   Bed Mobility   Comment, (Bed Mobility) Min A supine <> sit   Transfers   Comment, (Transfers) CGA sit <> stand with  FWW/crutches; increased stability with FWW   Gait/Stairs (Locomotion)   [place] Level (Gait) contact guard   Assistive Device (Gait) walker, front-wheeled   Distance in Feet 15'   Distance in Feet (Gait) 15' x 2   Deviations/Abnormal Patterns (Gait) antalgic   Maintains Weight-bearing Status (Gait) able to maintain   Clinical Impression   Criteria for Skilled Therapeutic Intervention Yes, treatment indicated   PT Diagnosis (PT) impaired mobility   Influenced by the following impairments pain, weakness, impaired balance   Functional limitations due to impairments fall risk   Clinical Presentation (PT Evaluation Complexity) Stable/Uncomplicated   Clinical Presentation Rationale clinical judgement   Clinical Decision Making (Complexity) low complexity   Planned Therapy Interventions (PT) bed mobility training;balance training;gait training;neuromuscular re-education;patient/family education;ROM (range of motion);stair training;strengthening;transfer training   Anticipated Equipment Needs at Discharge (PT) walker, rolling   Risk & Benefits of therapy have been explained care plan/treatment goals reviewed;evaluation/treatment results reviewed;risks/benefits reviewed;current/potential barriers reviewed;participants included;participants voiced agreement with care plan;patient   PT Total Evaluation Time   PT Eval, Low Complexity Minutes (23949) 25   Plan of Care Review   Plan of Care Reviewed With patient   Therapy Certification   Start of care date 01/26/23   Certification date from 01/26/23   Certification date to 02/03/23   Medical Diagnosis R ankle fracture s/p ORIF   Physical Therapy Goals   PT Frequency Daily   PT Predicted Duration/Target Date for Goal Attainment 02/03/23   PT Goals Bed Mobility;Transfers;Gait;Stairs   PT: Bed Mobility Independent   PT: Transfers Modified independent;Sit to/from stand;Bed to/from chair;Assistive device   PT: Gait Modified independent;Rolling walker;50 feet   PT: Stairs  Supervision/stand-by assist;Assistive device;Greater than 10 stairs;Rail on right  (3 flights up to apartment)   Interventions   Interventions Quick Adds Gait Training;Therapeutic Activity   Therapeutic Activity   Therapeutic Activities: dynamic activities to improve functional performance Minutes (85995) 10   Treatment Detail/Skilled Intervention education on elevation and posturing of R LE while at rest to assist with edema and pain management, trialed transfers with crutches and FWW requiring CGA progressing to SBA with FWW use and verbal cues (unsteady with crutches). Pt SBA with toilet transfer but does require grab use for safety. Discussed benefits of OT and tub transfer bench addition for home safety/ease of bathing. Pt report understanding.   Gait Training   Gait Training Minutes (54305) 15   Treatment Detail/Skilled Intervention gait training with FWW vs. crutches, verbal cues for technique and stability. Able to ambulate 15 ' x 3 reps with FWW being more stable with better tolerance compared with crutches. Pt maintains NWB R ankle 100% of the time. Demo and educated on stair navigation techniques; pt report understanding but too painful this PM to try.   [place] Level (Gait Training) stand-by assist   Physical Assistance Level (Gait Training) verbal cues   Weight Bearing (Gait Training) nonweight-bearing   Assistive Device (Gait Training) rolling walker   PT Discharge Planning   PT Plan gait with FWW, stairs with crutches vs. butt-scooting, overall edu with NWB in apartment setting   PT Discharge Recommendation (DC Rec) home with assist   PT Rationale for DC Rec Pt likely to progress to Independent to SBA with FWW for ambulation, crutches for stair navigation vs. butt-scooting. Will benefit from OT for bathroom safety (and tub transfer bench for tub shower). Pt has limited support until spouse home this Sunday from [place].   PT Brief overview of current status Min A bed mob, SBA ambulation with  FWW; improved safety with FWW (unsteady with crutches)   Total Session Time   Timed Code Treatment Minutes 25   Total Session Time (sum of timed and untimed services) 50

## 2023-01-27 NOTE — ED NOTES
Reduction adequate on post reduction x-ray.  Patient resting comfortably but has no ride home and does not feel like she can negotiate crutches use do to pain. Will admit to observation for pain control and orthopedic consultation in the morning.     Diagnosis:    (S82.851A) Closed trimalleolar fracture of right ankle, initial encounter    (S93.04XA) Ankle dislocation, right, initial encounter    (W19.XXXA) Fall, initial encounter       Matthew Fernandes MD  01/27/23 0142

## 2023-01-27 NOTE — ED PROVIDER NOTES
History     Chief Complaint:  Ankle Pain       HPI   Katelyn Baltazar is a 27 year old female with a history of anxiety who presents with right ankle pain. The patient states that she was walking to her car to go to work when she slipped on black ice. She fell and landed on her right foot. The patient was unable to stand after falling. She denies hurting anything else besides her ankle. She denies any chance of pregnancy as she has a birth control implant.    Review of External Notes:      ROS:  Review of Systems   Musculoskeletal: Positive for arthralgias and myalgias.   All other systems reviewed and are negative.    Allergies:  Denies any Known Allergies     Medications:    No medications    Past Medical History:    Anxiety  Ectopic pregnancy  Migraine  Glaucoma    Family History:    Brother: mental retardation    Social History:   reports that she has never smoked. She has never used smokeless tobacco. She reports that she does not drink alcohol and does not use drugs.  PCP: Sarika Lucas   The patient presents to the ED alone.  The patient arrived to the ED via EMS services.    Physical Exam     Patient Vitals for the past 24 hrs:   BP Temp Temp src Pulse Resp SpO2   01/26/23 2130 96/71 97.4  F (36.3  C) Temporal 78 26 100 %        Physical Exam  Vitals reviewed.   Constitutional:       Appearance: She is obese.   HENT:      Head: Normocephalic.   Cardiovascular:      Rate and Rhythm: Normal rate.   Pulmonary:      Effort: Pulmonary effort is normal.   Abdominal:      General: Abdomen is flat.      Palpations: Abdomen is soft.   Musculoskeletal:      Comments: Right leg: There is obvious swelling around the medial and lateral malleolus.  There is an intact DP pulse.  There is severe pain with any palpation of the calf or the lower extremity.  There is no open wound   Skin:     General: Skin is warm.      Capillary Refill: Capillary refill takes less than 2 seconds.   Neurological:      Mental  Status: She is alert.           Emergency Department Course     Imaging:  XR Foot Right 2 Views   Final Result   IMPRESSION: Comminuted, displaced fracture distal fibula. Mildly displaced fracture posterior malleolus and displaced fracture medial malleolus. Widening of ankle mortise with medial displacement of the distal tibia relative to the talus. Soft tissue    edema. No visible fracture of the foot.      XR Tibia and Fibula Right 2 Views   Final Result   IMPRESSION: Comminuted, displaced fracture distal fibula. Mildly displaced fracture posterior malleolus and displaced fracture medial malleolus. Widening of ankle mortise with medial displacement of the distal tibia relative to the talus. Soft tissue    edema. No visible fracture of the foot.         Report per radiology    Laboratory:  Labs Ordered and Resulted from Time of ED Arrival to Time of ED Departure - No data to display     Procedures   Procedure Note: Procedural Anesthesia  Performed by Marky Mendoza MD   Expected Level:  Deep Sedation.  Indication: Sedation is required to allow for right ankle fracture dislocation reduction.  Consent:  Risks, benefits and alternatives were discussed with patient and consent for procedure was obtained.  Timeout:  Universal protocol was followed. TIME OUT conducted just prior to starting procedure confirmed patient identity, site/side, procedure, patient position, and availability of correct equipment and implants.  PO Intake:  Appropriately NPO for procedure  ASA Class:  Class 2 - MILD SYSTEMIC DISEASE, NO ACUTE PROBLEMS, NO FUNCTIONAL LIMITATIONS.  Mallampati:  Grade 2:  Soft palate, base of uvula, tonsillar pillars, and portion of posterior pharyngeal wall visible  Lungs: Lungs Clear with good breath sounds bilaterally.  Heart: Normal heart sounds and rate  History and physical reviewed and no updates needed. I have reviewed the lab findings, diagnostic data, medications, and the plan for sedation. I have  determined this patient to be an appropriate candidate for the planned sedation and procedure and have reassessed the patient IMMEDIATELY PRIOR to sedation and procedure.  Prior to the start of the procedure and with procedural staff participation, I verbally confirmed the patient s identity using two indicators, relevant allergies, that the procedure was appropriate and matched the consent or emergent situation, and that the correct equipment/implants were available. Immediately prior to starting the procedure I conducted the Time Out with the procedural staff and re-confirmed the patient s name, procedure, and site/side. (The Joint Commission universal protocol was followed.) Yes  Sedatives: Fentanyl and Propofol  Monitoring: Vital signs, airway, End Tidal CO2 and pulse oximetry were monitored and remained stable throughout the procedure and sedation was maintained until the procedure was complete.  The patient was monitored by staff until sedation discharge criteria were met.  Patient tolerance: Patient tolerated the procedure well with no immediate complications.  Total Physician Drug Administration / Monitoring Time: 15 minutes of which I was personally present and monitoring the patient. Patient was monitored during recovery and returned to pre-procedure baseline.    Procedure Note: Reduction of Fractured Dislocation of trimalleolar right ankle  Performed by Marky Mendoza MD   Diagnosis: Fracture dislocation of right trimalleolar ankle fx  Consent:  Risks, benefits and alternatives were discussed with patient and consent for procedure was obtained.  Description of Procedure: Patient positioned in supine position.  Procedural anesthesia was utilized, see above note.  The right ankle was grasped above and below fracture. Recreating mechanism of injury the fracture area was reduced successfully utilizing traction and angulation motions. The neurovascular exam was normal before and after reduction  attempt.  The patient tolerated the procedure well, there were no complications.      Procedure Note: Splint Placement  Performed by Marky Mendoza MD  Consent:  Risks, benefits and alternatives were discussed with patient and consent for procedure was obtained.  Description of Procedure:  Following reduction of Right ankle dislocation a short leg posterior mold with stirrup splint was applied to the Right ankle.  The ankle was maintained at 90 degrees .  The neurovascular exam was normal before and after splint placement.   The patient tolerated the procedure well, there were no complications.     Emergency Department Course & Assessments:     Interventions:  Medications   fentaNYL (PF) (SUBLIMAZE) injection 25 mcg (25 mcg Intravenous Given 1/26/23 2200)   flumazenil (ROMAZICON) injection 0.2 mg (has no administration in time range)   fentaNYL (PF) (SUBLIMAZE) injection 50 mcg (has no administration in time range)   fentaNYL (PF) (SUBLIMAZE) injection 50 mcg (has no administration in time range)   propofol (DIPRIVAN) injection 10 mg/mL vial (has no administration in time range)   ketorolac (TORADOL) injection 15 mg (15 mg Intravenous Given 1/26/23 2159)   LORazepam (ATIVAN) injection 0.5 mg (0.5 mg Intravenous Given 1/26/23 2236)        Independent Interpretation (X-rays, CTs, rhythm strip):      Consultations/Discussion of Management or Tests:       Assessments:  2149 Initial assessment  2250 I discussed results and plan of care    Disposition:  Care of the patient was transferred to my colleague Dr. Fernandes pending Post reduction xrays and trial of ambulation.     Impression & Plan      Medical Decision Making:  Patient presents with mechanical fall on black ice.  No signs of head trauma no concerns for C-spine injury isolated complaint of right ankle pain.  Imaging demonstrates fracture dislocation of the right ankle.  Care was discussed with the patient using  ultimately patient was agreeable  with closed reduction.  This was performed by me as well as splint placement and patient Toller procedure well.  Patient was signed out to the oncoming physician pending postreduction x-rays and a trial of ambulation patient it was not quite a bit of pain on arrival.  If patient does not tolerate ambulation patient may require admission if patient is post reductions are satisfactory the patient is able to tolerate ambulation can be discharged with outpatient follow-up for surgical repair of her trimalleolar fracture      Diagnosis:    ICD-10-CM    1. Closed displaced trimalleolar fracture of right ankle, initial encounter  S82.851A Rolling Knee Walker DME     Walker DME     Crutches DME     acetaminophen (TYLENOL) 325 MG tablet     aspirin (ASA) 325 MG EC tablet     oxyCODONE (ROXICODONE) 5 MG tablet     ondansetron (ZOFRAN ODT) 4 MG ODT tab     polyethylene glycol (MIRALAX) 17 GM/Dose powder     senna-docusate (SENOKOT-S/PERICOLACE) 8.6-50 MG tablet      2. Closed trimalleolar fracture of right ankle, initial encounter  S82.851A Case Request: OPEN REDUCTION INTERNAL FIXATION, FRACTURE, ANKLE     Case Request: OPEN REDUCTION INTERNAL FIXATION, FRACTURE, ANKLE      3. Ankle dislocation, right, initial encounter  S93.04XA       4. Fall, initial encounter  W19.XXXA            Discharge Medications:  New Prescriptions    No medications on file      Scribe Disclosure:  I, Yash Hou, am serving as a scribe at 10:01 PM on 1/26/2023 to document services personally performed by Marky Mendoza MD based on my observations and the provider's statements to me.   1/26/2023   Marky Mendoza MD Goodman, Brian Samuel, MD  01/27/23 1748

## 2023-01-27 NOTE — OP NOTE
ORTHOPEDIC SURGERY  OPERATIVE NOTE    Katelyn Baltazar  [id number]  1995 January 27, 2023      PREOPERATIVE DIAGNOSIS:    1. Right Trimalleolar ankle fracture dislocation    POSTOPERATIVE DIAGNOSIS:   Same    PROCEDURE:    1. Open reduction and internal fixation of the right distal fibula   2. Open reduction and internal fixation of the right medial malleolus  3. Closed reduction and splinting of the  posterior malleolus    SURGEON:  Denis Boudreaux MD    ASSISTANT: YONATHAN Bolivar     SPECIMENS:  None     COMPLICATIONS:  None    ESTIMATED BLOOD LOSS: 20cc    IMPLANTS:   Implant Name Type Inv. Item Serial No.  Lot No. LRB No. Used Action   IMP PLATE SYN 1/3 TUBULAR 93MM 08H .381 - [id number] Metallic Hardware/[place] IMP PLATE SYN 1/3 TUBULAR 93MM 08H .381  [employer] 8006 [id number] Right 1 Implanted   IMP SCR SYN CORTEX 2.7X16MM SELF TAP .816 - [id number] Metallic Hardware/[place] IMP SCR SYN CORTEX 2.7X16MM SELF TAP .816  [employer] 8006 [id number] Right 1 Implanted   IMP SCR SYN CORTEX 3.5X10MM SELF TAP .810 - [id number] Metallic Hardware/[place] IMP SCR SYN CORTEX 3.5X10MM SELF TAP .810  [employer] 8006 [id number] Right 1 Implanted   IMP SCR SYN CORTEX 3.5X14MM SELF TAP .814 - [id number] Metallic Hardware/[place] IMP SCR SYN CORTEX 3.5X14MM SELF TAP .814  SY[employer] 8006 [id number] Right 1 Implanted   IMP SCR SYN CAN 4.0X12MM FT .012 - [id number] Metallic Hardware/[place] IMP SCR SYN CAN 4.0X12MM FT .012  [employer] 8006 [id number] Right 2 Implanted   IMP SCR SYN CORTEX 3.5X12MM SELF TAP .812 - [id number] Metallic Hardware/[place] IMP SCR SYN CORTEX 3.5X12MM SELF TAP .812  SY[employer] 8006 [id number] Right 2 Implanted   IMP SCR SYN CORTEX 2.7X18MM SELF TAP .818 - [id number] Metallic Hardware/[place] IMP SCR SYN CORTEX 2.7X18MM SELF TAP .818  SY[employer] 8006 [id number] Right 1  Implanted   IMP SCR SYN CAN 4.0X50MM LONG THRD .750 - [id number] Metallic Hardware/[place] IMP SCR SYN CAN 4.0X50MM LONG THRD .750  SY[employer] [id number] Right 2 Implanted       TOURNIQUET TIME:  70 min @ 250 mmHg    INDICATIONS:    Katelyn Baltazar is a 27 year old female who presents with a right ankle fracture after slipping on ice while walking to her car.  X-rays revealed a trimalleolar ankle fracture.  She is neurovascular intact.  We discussed possible treatment options including nonoperative and operative fixation along with the risks and benefits.  After thorough discussion, I recommended surgery to improve the alignment and function of her ankle.  She was in agreement and elected to proceed.  A  was utilized for the history, physical exam, discussion regarding possible treatment.      The risks of bleeding, infection, nerve damage, nonunion, malunion, hardware failure, arthritis, loss of motion, pain, further surgery, stiffness, and the medical risks of anesthesia were discussed. Benefits including improved chance of motion, earlier rehab and improved function were discussed.     Alternatives including nonoperative management were discussed, but not recommended.  The patient was in agreement with the plan to proceed.  The informed consent was signed and documented.  Met with the patient preoperatively to iwona the operative extremity.    OPERATIVE COURSE:    The patient was brought into the operating room and placed on operating table.  The patient underwent general anesthesia.  The patient was positioned in a supine position  with a hip bump and bone foam to elevate the operative leg.  A tourniquet was placed.   All bony prominences were well-padded.  The operative extremity was cleansed with Hibiclens. The operative extremity was then prepped with ChloraPrep.  The surgical team scrubbed in.  A WHO timeout was conducted to verify correct patient, correct  extremity, presence of the surgeon's initials on the operative extremity, and administration of IV antibiotics, in this case Ancef.      Approximately 20cc of quarter percent Marcaine with epinephrine were injected over the incisions for the distal fibula and medial malleolus.     Distal Fibula ORIF  First, we turned our attention to the distal fibula fracture.  A longitudinal incision was made over the distal fibula.  Careful dissection ensued through the soft tissues down to fascia and periosteum.  Soft tissues were elevated off the distal fibula.  The patient's long oblique distal fibula fracture with a small butterfly fragment was visualized and debrided with rongeurs, curettes, and irrigation.  A sharp new knife was used to visualize the cortical edges.  2 point-to-point bone clamps were used to reduce the fracture with good cortical fit.  Two  2.7 mm lag screw was then placed from anterior to posterior.  Fluoroscopy was utilized to confirm good placement of the leg screw as well as good reduction of the fracture.      A Synthes 1/3 tubular 8-hole plate was then positioned on the lateral surface of the distal fibula. Placement was confirmed on fluorscopy.  The plate was brought down to bone using a proximal cortical screw followed by distal Cancellous screws.  A total of 3 distal screws were utilized and 3 proximal cortical screws.     Medial Malleolus ORIF  We then turned our attention to the medial malleolus fracture a longitudinal incision was made over the medial malleolus.  Careful dissection was utilized down to fascia and periosteum.  An elevator was used to visualize the fracture.  A transverse fracture of the medial malleolus with some posterior comminution was visualized.  Irrigation was used to debride the fracture.  A rongeur and curettes to remove soft tissue from the fracture site.  There was no obvious damage to the ankle joint    The fracture was held in place with a point to point bone clamp  and then stabilited with K wires.  Fluoroscopy was utilized to confirm good placement of the K wires and reduction of the fracture.  2 K wires were then placed for the Synthes 4-0 cannulated screws.  Fluoroscopy was utilized to confirm good placement of the K wires.  The distal aspect of the medial malleolus was then drilled for the Synthes cannulated screws.  Two 50 mm 4-0 screws were then placed from distal to proximal.  Good reduction of the medial malleolus was noted.  Again fluoroscopy confirmed good placement of the screws.  K wires were then removed.       Posterior Malleolus  We then turned our attention to the posterior malleolus.  Fluoroscopy confirmed good reduction of the posterior malleolus.  It was noted to be less than 20% of the distal tibia.  It was well reduced with no large articular piece.  The decision was made to close reduce the posterior malleolus and placed the patient in a splint postoperatively     Fluoroscopy was utilized for final x-rays to confirm good placement of all hardware and reduction of the patient's ankle.  The wounds were then copiously irrigated with sterile saline.  Deep fascia was closed with 0 Vicryl followed by 2-0 Monocryl in the subcutaneous layer and running 3-0 nylon on the skin.  The patient's wounds were then dressed with Xeroform, gauze, ABDs, and cast padding.  The patient was placed in a short leg splint.    All instruments were accounted for at the end of the case. The patient awoke from anesthesia and was transferred to the PACU in stable condition.      POST OP PLAN:    1.  Ancef x 24 hours if patient stays following surgery  2.  ASA 325mg daily for DVT prophylaxis x 6 weeks  3.  PACU x-rays.   4.  Non-weightbearing Right Lower Extremity   5.  Physical therapy/occupational therapy.   6.  Keep dressing on for 2 weeks.  OK to shower.  No submerging wound.  7.  Vitamin D 2000U  8.  Discharge:  Case management social work consult for placement        FOLLOWUP:      Please call as soon as possible to make an appointment to be seen in Dr. Denis Boudreaux's clinic in 2 weeks.     Dr. Boudreaux's care coordinator is Anya Harris. Please contact her at [phone] to schedule an appointment.      Dr. Boudreaux sees patient's at 2 clinic locations:    [facility]  o [address] [place] [address]    [facility]   o [address], [address], [place] [address]      Please call the on-call phone number [phone] during evenings, nights and weekends for any urgent needs. Prescription refills must be done during business hours by calling [phone]      Denis Boudreaux MD  [facility]

## 2023-01-27 NOTE — SEDATION DOCUMENTATION
Patient tolerated procedure well, alert and orientated, CMS intact, update given to RN resuming care

## 2023-01-27 NOTE — ANESTHESIA PREPROCEDURE EVALUATION
Anesthesia Pre-Procedure Evaluation    Patient: Katelyn Baltazar   MRN: [id number] : 1995        Procedure : Procedure(s):  OPEN REDUCTION INTERNAL FIXATION, FRACTURE, RIGHT ANKLE          Past Medical History:   Diagnosis Date     NO ACTIVE PROBLEMS      Supervision of normal pregnancy 2018      Past Surgical History:   Procedure Laterality Date     NO HISTORY OF SURGERY        No Known Allergies   Social History     Tobacco Use     Smoking status: Never     Smokeless tobacco: Never   Substance Use Topics     Alcohol use: No      Wt Readings from Last 1 Encounters:   23 85.2 kg (187 lb 13.3 oz)        Anesthesia Evaluation   Pt has had prior anesthetic. Type: General.    No history of anesthetic complications       ROS/MED HX  ENT/Pulmonary:  - neg pulmonary ROS     Neurologic:  - neg neurologic ROS     Cardiovascular:  - neg cardiovascular ROS     METS/Exercise Tolerance:     Hematologic:  - neg hematologic  ROS     Musculoskeletal:   (+) fracture,     GI/Hepatic:  - neg GI/hepatic ROS     Renal/Genitourinary:  - neg Renal ROS     Endo:  - neg endo ROS     Psychiatric/Substance Use:  - neg psychiatric ROS     Infectious Disease:  - neg infectious disease ROS     Malignancy:       Other:            Physical Exam    Airway        Mallampati: II   TM distance: > 3 FB   Neck ROM: full   Mouth opening: > 3 cm    Respiratory Devices and Support         Dental       (+) Completely normal teeth      Cardiovascular   cardiovascular exam normal          Pulmonary   pulmonary exam normal                OUTSIDE LABS:  CBC:   Lab Results   Component Value Date    WBC 9.1 2022    WBC 9.6 2020    HGB 13.6 2022    HGB 11.5 (L) 2020    HCT 42.5 2022    HCT 36.1 2020     2022     2020     BMP:   Lab Results   Component Value Date     2022     2019    POTASSIUM 3.6 2022    POTASSIUM 3.5 2019    CHLORIDE 103  11/17/2022    CHLORIDE 108 11/18/2019    CO2 25 11/17/2022    CO2 24 11/18/2019    BUN 20.9 (H) 11/17/2022    BUN 8 11/18/2019    CR 0.85 11/17/2022    CR 0.48 (L) 11/18/2019    GLC 81 11/17/2022    GLC 72 11/18/2019     COAGS: No results found for: PTT, INR, FIBR  POC:   Lab Results   Component Value Date    HCG Positive (A) 11/18/2019     HEPATIC:   Lab Results   Component Value Date    ALBUMIN 4.4 11/17/2022    PROTTOTAL 7.6 11/17/2022    ALT 24 11/17/2022    AST 29 11/17/2022    ALKPHOS 133 (H) 11/17/2022    BILITOTAL 0.3 11/17/2022     OTHER:   Lab Results   Component Value Date    SHAHRZAD 9.5 11/17/2022    LIPASE 133 09/30/2018       Anesthesia Plan    ASA Status:  2      Anesthesia Type: General.     - Airway: LMA   Induction: Intravenous.   Maintenance: Balanced.        Consents    Anesthesia Plan(s) and associated risks, benefits, and realistic alternatives discussed. Questions answered and patient/representative(s) expressed understanding.    - Discussed:     - Discussed with:  Patient      - Extended Intubation/Ventilatory Support Discussed: No.      - Patient is DNR/DNI Status: No    Use of blood products discussed: No .     Postoperative Care    Pain management: IV analgesics, Oral pain medications, Multi-modal analgesia, Peripheral nerve block (Single Shot) (consented for post-op nerve block if required for pain control).   PONV prophylaxis: Ondansetron (or other 5HT-3), Dexamethasone or Solumedrol     Comments:                Doc Ascencio MD

## 2023-01-27 NOTE — PLAN OF CARE
[facility]  OUTPATIENT PHYSICAL THERAPY EVALUATION  PLAN OF TREATMENT FOR OUTPATIENT REHABILITATION  (COMPLETE FOR INITIAL CLAIMS ONLY)  Patient's Last Name, First Name, M.I.  YOB: 1995  Katelyn Baltazar                        Provider's Name  [facility] Medical Record No.  [id number]                             Onset Date:  01/26/23   Start of Care Date:  01/26/23   Type:     _X_PT   ___OT   ___SLP Medical Diagnosis:  R ankle fracture s/p ORIF              PT Diagnosis:  impaired mobility Visits from SOC:  1     See note for plan of treatment, functional goals and certification details    I CERTIFY THE NEED FOR THESE SERVICES FURNISHED UNDER        THIS PLAN OF TREATMENT AND WHILE UNDER MY CARE     (Physician co-signature of this document indicates review and certification of the therapy plan).

## 2023-01-27 NOTE — ED NOTES
Bed: [facility]  Expected date: 1/26/23  Expected time: 9:12 PM  Means of arrival: Ambulance  Comments:  [facility] 28 [facility] F ankle

## 2023-01-27 NOTE — ED TRIAGE NOTES
Patient arrives with [facility]- patient was outside walking, slipped and fell while twisting her right ankle. Patient c/o right medial and dorsal of right ankle. Medics gave 400mg of Ibuprofen en route.

## 2023-01-27 NOTE — PLAN OF CARE
Orthopedic Surgery.    Katelyn Baltazar is a 26 y/o female with a trimalleolar fracture. I recommend surgery to improve the alignment and function of her unstable ankle fracture.     Plan for right ankle ORIF today, Likely 7:30am  NPO    NATHALY TODD MD    0 = independent

## 2023-01-27 NOTE — CARE PLAN
Patient arrived on the floor at at 1319. Denied pain. Able to ambulate and pivot to bed. IV fluids infusing continuously. Ancef for antibiotics. Due to void.  Will continue to monitor.

## 2023-01-27 NOTE — PROGRESS NOTES
An ETCO2 monitor was placed on the pt with 2LPM bled in. The Ambu bag, suction, and airways were setup and present in the room, but not needed. Pt was able to maintain airway throughout the procedure with no intervention needed. ETCO2 levels were maintained between 37-41    Fatimah Greenfield, RT

## 2023-01-27 NOTE — ANESTHESIA POSTPROCEDURE EVALUATION
Patient: Katelyn Baltazar    Procedure: Procedure(s):  OPEN REDUCTION INTERNAL FIXATION, FRACTURE, RIGHT ANKLE       Anesthesia Type:  General    Note:  Disposition: Outpatient   Postop Pain Control: Uneventful            Sign Out: Well controlled pain   PONV: No   Neuro/Psych: Uneventful            Sign Out: Acceptable/Baseline neuro status   Airway/Respiratory: Uneventful            Sign Out: Acceptable/Baseline resp. status   CV/Hemodynamics: Uneventful            Sign Out: Acceptable CV status; No obvious hypovolemia; No obvious fluid overload   Other NRE: NONE   DID A NON-ROUTINE EVENT OCCUR? No           Last vitals:  Vitals Value Taken Time   /62 01/27/23 1230   Temp 97.7  F (36.5  C) 01/27/23 0940   Pulse 87 01/27/23 1230   Resp 25 01/27/23 1155   SpO2 97 % 01/27/23 1240       Electronically Signed By: Doc Ascencio MD  January 27, 2023  3:50 PM

## 2023-01-27 NOTE — H&P
Hospitalist H&P    Name: Katelyn Baltazar      MRN: [id number]  YOB: 1995    Age: 27 year old  Date of admission: 1/26/2023  Primary care provider: Sarika Lucas          Assessment and Plan:     Katelyn Baltazar is a 27 year old female with a history of migraine and anxiety who slipped on black ice and fell down while walking to her car and sustained trauma to her right ankle and presents to the emergency room and found to have right ankle fracture and being admitted under observation.    1. Right trimalleolar/ankle fracture.  -Patient fell down on black ice and sustained trauma to her right ankle.  -X-ray showed right trimalleolar fracture and it was reduced and immobilized.  -Pain control, use Tylenol and ibuprofen and if pain is not controlled may use oxycodone and Dilaudid.  -Orthopedic surgery will be consulted.  I suspect this is nonsurgical  -PT will evaluate the patient to help her with using crutches.  -Expect patient will be discharged less than 24 hours.    Clinically Significant Risk Factors Present on Admission                             Code status: Full code.  Admit to under observation.  Prophylaxis: Ambulate the patient.          Chief Complaint:     Following trauma to right ankle         History of Present Illness:   Katelyn Baltazar is a 27 year old female with history of migraine and anxiety who slipped on black ice and fell down while walking to her car and sustained trauma to her right ankle.  She presents to the emergency room and found to have right ankle fracture and being admitted under observation.  Patient denied loss of consciousness or hitting her head.  She denied any pain anywhere else.  She denied any cough, runny nose or sore throat.  History was obtained from my discussion with the patient at the bedside.  I also discussed the case with the ED provider.  The electronic medical record was also reviewed.           Past Medical History:     Past Medical History:   Diagnosis Date     NO ACTIVE PROBLEMS      Supervision of normal pregnancy 12/4/2018   Anxiety  Migraine  Glaucoma          Past Surgical History:     Past Surgical History:   Procedure Laterality Date     NO HISTORY OF SURGERY               Social History:     Social History     Tobacco Use     Smoking status: Never     Smokeless tobacco: Never   Substance Use Topics     Alcohol use: No             Family History:   The family history was fully reviewed and non-contributory in this case.         Allergies:   No Known Allergies          Medications:     Prior to Admission medications    Medication Sig Last Dose Taking? Auth Provider Long Term End Date   ibuprofen (ADVIL/MOTRIN) 600 MG tablet Take 1 tablet (600 mg) by mouth every 6 hours as needed for pain Take w/ food   Marcus Evans MD     ondansetron (ZOFRAN ODT) 4 MG ODT tab Take 1 tablet (4 mg) by mouth every 8 hours as needed for nausea or vomiting   Greyson Billy MD     order for DME Equipment being ordered: Maternity belt  Patient not taking: Reported on 5/30/2019   Kristie Mathis MD     Prenatal Vit-Fe Fumarate-FA (PRENATAL MULTIVITAMIN W/IRON) 27-0.8 MG tablet Take 1 tablet by mouth daily   Diana Madison APRN CNM               Review of Systems:     A Comprehensive greater than 10 system review of systems was carried out.  Pertinent positives and negatives are noted above.  Otherwise negative for contributory information.           Physical Exam:   Blood pressure 116/64, pulse 78, temperature 97.4  F (36.3  C), temperature source Temporal, resp. rate 26, weight 85.2 kg (187 lb 13.3 oz), SpO2 100 %, not currently breastfeeding.  Wt Readings from Last 1 Encounters:   01/26/23 85.2 kg (187 lb 13.3 oz)     Exam:  GENERAL: No apparent distress. Awake, alert, and fully oriented.  HEENT: Normocephalic, atraumatic. Extraocular movements intact.  CARDIOVASCULAR: Regular rate and rhythm without  murmurs or rubs. No S3.  PULMONARY: Clear to auscultation bilaterally.  ABDOMINAL: Soft, non-tender, non-distended. Bowel sounds normoactive.   EXTREMITIES: Right leg immobilized, good capillary refill on the toes, no neurovascular compromise.  NEUROLOGICAL: CN 2-12 grossly intact, no focal neurological deficits.  SKIN: No rash, ulcer, bruising, nor jaundice.          Data:   EKG: Not done    Laboratory:  No results for input(s): WBC, HGB, HCT, MCV, PLT in the last 168 hours.  No results for input(s): NA, POTASSIUM, CHLORIDE, CO2, ANIONGAP, GLC, BUN, CR, GFRESTIMATED, GFRESTBLACK, SHAHRZAD in the last 168 hours.  No results for input(s): GLC, BGM in the last 168 hours.  No results for input(s): CULT in the last 168 hours.    Imaging:  Recent Results (from the past 24 hour(s))   XR Tibia and Fibula Right 2 Views    Narrative    EXAM: XR FOOT RIGHT 2 VIEWS, XR TIBIA AND FIBULA RIGHT 2 VIEWS  LOCATION: [facility]  DATE/TIME: 1/26/2023 10:32 PM    INDICATION: Fall. Injury with pain.  COMPARISON: None.      Impression    IMPRESSION: Comminuted, displaced fracture distal fibula. Mildly displaced fracture posterior malleolus and displaced fracture medial malleolus. Widening of ankle mortise with medial displacement of the distal tibia relative to the talus. Soft tissue   edema. No visible fracture of the foot.   XR Foot Right 2 Views    Narrative    EXAM: XR FOOT RIGHT 2 VIEWS, XR TIBIA AND FIBULA RIGHT 2 VIEWS  LOCATION: [facility]  DATE/TIME: 1/26/2023 10:32 PM    INDICATION: Fall. Injury with pain.  COMPARISON: None.      Impression    IMPRESSION: Comminuted, displaced fracture distal fibula. Mildly displaced fracture posterior malleolus and displaced fracture medial malleolus. Widening of ankle mortise with medial displacement of the distal tibia relative to the talus. Soft tissue   edema. No visible fracture of the foot.   XR Ankle Right 2 Views    Narrative    EXAM: XR ANKLE  RIGHT 2 VIEWS  LOCATION: [facility]  DATE/TIME: 1/27/2023 12:43 AM    INDICATION: Postreduction imaging.  COMPARISON: 01/26/2023.      Impression    IMPRESSION: Interval reduction of a trimalleolar right ankle fracture. Possible additional extensor digitorum brevis avulsion fracture. Overlying splint material obscures osseous detail.       Catrachito Menezes MD  [facility] Hospitalist  [address] [place] [address]  01/27/2023

## 2023-01-27 NOTE — ANESTHESIA CARE TRANSFER NOTE
Patient: Katelyn Baltazar    Procedure: Procedure(s):  OPEN REDUCTION INTERNAL FIXATION, FRACTURE, RIGHT ANKLE       Diagnosis: Closed trimalleolar fracture of right ankle, initial encounter [S87.823B]  Diagnosis Additional Information: No value filed.    Anesthesia Type:   General     Note:    Oropharynx: oropharynx clear of all foreign objects  Level of Consciousness: awake and drowsy  Oxygen Supplementation: face mask  Level of Supplemental Oxygen (L/min / FiO2): 6  Independent Airway: airway patency satisfactory and stable  Dentition: dentition unchanged  Vital Signs Stable: post-procedure vital signs reviewed and stable  Report to RN Given: handoff report given  Patient transferred to: PACU    Handoff Report: Identifed the Patient, Identified the Reponsible Provider, Reviewed the pertinent medical history, Discussed the surgical course, Reviewed Intra-OP anesthesia mangement and issues during anesthesia, Set expectations for post-procedure period and Allowed opportunity for questions and acknowledgement of understanding      Vitals:  Vitals Value Taken Time   BP 99/61 01/27/23 0937   Temp     Pulse 81 01/27/23 0940   Resp 28 01/27/23 0940   SpO2 96 % 01/27/23 0940   Vitals shown include unvalidated device data.    Electronically Signed By: SHANEL Villegas CRNA  January 27, 2023  9:41 AM

## 2023-01-27 NOTE — ED NOTES
Using , discussed admit with patient. Right leg elevated on pillow for comfort. Denies need for pain medication at this time. Right PIV saline locked. Provided with warm blanket and phone .

## 2023-01-27 NOTE — PROGRESS NOTES
[facility]    Medicine Progress Note - Hospitalist Service    Date of Admission:  1/26/2023  Date of Service: 01/27/2023    Assessment & Plan      Katelyn Baltazar is a 27 year old female with a history of migraine and anxiety who slipped on black ice and fell down while walking to her car and sustained trauma to her right ankle and presents to the emergency room and found to have right ankle fracture and being admitted under observation.     1. Right trimalleolar/ankle fracture.  -Patient fell down on black ice and sustained trauma to her right ankle.  -X-ray showed right trimalleolar fracture and it was reduced and immobilized.  Plan:  -Pain control, use Tylenol and ibuprofen and if pain is not controlled may use oxycodone and Dilaudid.  -Orthopedic surgery will be consulted -> s/p OPEN REDUCTION INTERNAL FIXATION, FRACTURE, RIGHT ANKLE  -PT will evaluate the patient to help her with using crutches.  -Expect patient will be discharged less than 24 hours.     Diet: Advance Diet as Tolerated: Regular Diet Adult    DVT Prophylaxis: Pneumatic Compression Devices  Contreras Catheter: Not present  Lines: None     Cardiac Monitoring: None  Code Status: Full Code      Clinically Significant Risk Factors Present on Admission                               Disposition Plan      Expected Discharge Date: 01/28/2023                  Ray Simmons MD  Hospitalist Service  [facility]  Securely message with [employer] (more info)  Text page via [employer] Paging/Directory   ______________________________________________________________________    Interval History     OR today    Physical Exam   Vital Signs: Temp: 97.2  F (36.2  C) Temp src: Temporal BP: 96/47 Pulse: 77   Resp: 18 SpO2: 96 % O2 Device: None (Room air) Oxygen Delivery: 8 LPM  Weight: 187 lbs 13.31 oz    ----------------------------------------------------------------------------------------    Medical Decision Making       20 MINUTES  SPENT BY ME on the date of service doing chart review, history, exam, documentation & further activities per the note.      Data         Imaging results reviewed over the past 24 hrs:   Recent Results (from the past 24 hour(s))   XR Tibia and Fibula Right 2 Views    Narrative    EXAM: XR FOOT RIGHT 2 VIEWS, XR TIBIA AND FIBULA RIGHT 2 VIEWS  LOCATION: [facility]  DATE/TIME: 1/26/2023 10:32 PM    INDICATION: Fall. Injury with pain.  COMPARISON: None.      Impression    IMPRESSION: Comminuted, displaced fracture distal fibula. Mildly displaced fracture posterior malleolus and displaced fracture medial malleolus. Widening of ankle mortise with medial displacement of the distal tibia relative to the talus. Soft tissue   edema. No visible fracture of the foot.   XR Foot Right 2 Views    Narrative    EXAM: XR FOOT RIGHT 2 VIEWS, XR TIBIA AND FIBULA RIGHT 2 VIEWS  LOCATION: [facility]  DATE/TIME: 1/26/2023 10:32 PM    INDICATION: Fall. Injury with pain.  COMPARISON: None.      Impression    IMPRESSION: Comminuted, displaced fracture distal fibula. Mildly displaced fracture posterior malleolus and displaced fracture medial malleolus. Widening of ankle mortise with medial displacement of the distal tibia relative to the talus. Soft tissue   edema. No visible fracture of the foot.   XR Ankle Right 2 Views    Narrative    EXAM: XR ANKLE RIGHT 2 VIEWS  LOCATION: [facility]  DATE/TIME: 1/27/2023 12:43 AM    INDICATION: Postreduction imaging.  COMPARISON: 01/26/2023.      Impression    IMPRESSION: Interval reduction of a trimalleolar right ankle fracture. Possible additional extensor digitorum brevis avulsion fracture. Overlying splint material obscures osseous detail.   XR Surgery LUIS DANIEL L/T 5 Min Fluoro w Stills    Narrative    This exam was marked as non-reportable because it will not be read by a   radiologist or a [facility] non-radiologist provider.         XR  Ankle Port Right G/E 3 Views    Narrative    XR ANKLE PORT RIGHT G/E 3 VIEWS   1/27/2023 10:01 AM     HISTORY: s/p ORIF  COMPARISON: 1/27/2023       Impression    IMPRESSION: Interval plate-screw fixation of distal fibular fracture  and screw fixation of medial malleolar fracture. Alignment is  near-anatomic. Overlying cast.    MORGAN EPPS MD         SYSTEM ID:  [id number]     Most Recent 3 CBC's:Recent Labs   Lab Test 11/17/22  0748 01/27/20  1210 11/18/19  2203   WBC 9.1 9.6 10.6   HGB 13.6 11.5* 11.3*   MCV 91 86 88    255 285     Most Recent 3 BMP's:Recent Labs   Lab Test 11/17/22  0748 11/18/19 2203 10/21/18  2134    138 135   POTASSIUM 3.6 3.5 3.8   CHLORIDE 103 108 105   CO2 25 24 22   BUN 20.9* 8 7   CR 0.85 0.48* 0.53   ANIONGAP 12 6 8   SHAHRZAD 9.5 8.7 8.6   GLC 81 72 99     Most Recent 2 LFT's:Recent Labs   Lab Test 11/17/22  0748 01/27/20  1210   AST 29 21   ALT 24 14   ALKPHOS 133* 87   BILITOTAL 0.3 0.3     Most Recent 3 INR's:No lab results found.

## 2023-01-28 ENCOUNTER — APPOINTMENT (OUTPATIENT)
Dept: PHYSICAL THERAPY | Facility: CLINIC | Age: 28
End: 2023-01-28
Payer: COMMERCIAL

## 2023-01-28 ENCOUNTER — APPOINTMENT (OUTPATIENT)
Dept: OCCUPATIONAL THERAPY | Facility: CLINIC | Age: 28
End: 2023-01-28
Attending: STUDENT IN AN ORGANIZED HEALTH CARE EDUCATION/TRAINING PROGRAM
Payer: COMMERCIAL

## 2023-01-28 VITALS
BODY MASS INDEX: 34.35 KG/M2 | WEIGHT: 187.83 LBS | OXYGEN SATURATION: 98 % | RESPIRATION RATE: 18 BRPM | SYSTOLIC BLOOD PRESSURE: 99 MMHG | DIASTOLIC BLOOD PRESSURE: 43 MMHG | HEART RATE: 77 BPM | TEMPERATURE: 98.2 F

## 2023-01-28 LAB
FASTING STATUS PATIENT QL REPORTED: NO
GLUCOSE SERPL-MCNC: 123 MG/DL (ref 70–99)
HGB BLD-MCNC: 11.9 G/DL (ref 11.7–15.7)

## 2023-01-28 PROCEDURE — 258N000003 HC RX IP 258 OP 636: Performed by: STUDENT IN AN ORGANIZED HEALTH CARE EDUCATION/TRAINING PROGRAM

## 2023-01-28 PROCEDURE — 250N000013 HC RX MED GY IP 250 OP 250 PS 637: Performed by: STUDENT IN AN ORGANIZED HEALTH CARE EDUCATION/TRAINING PROGRAM

## 2023-01-28 PROCEDURE — 97535 SELF CARE MNGMENT TRAINING: CPT | Mod: GO | Performed by: REHABILITATION PRACTITIONER

## 2023-01-28 PROCEDURE — 99239 HOSP IP/OBS DSCHRG MGMT >30: CPT | Performed by: INTERNAL MEDICINE

## 2023-01-28 PROCEDURE — 250N000013 HC RX MED GY IP 250 OP 250 PS 637: Performed by: INTERNAL MEDICINE

## 2023-01-28 PROCEDURE — 82947 ASSAY GLUCOSE BLOOD QUANT: CPT | Performed by: ORTHOPAEDIC SURGERY

## 2023-01-28 PROCEDURE — G0378 HOSPITAL OBSERVATION PER HR: HCPCS

## 2023-01-28 PROCEDURE — 97116 GAIT TRAINING THERAPY: CPT | Mod: GP | Performed by: PHYSICAL THERAPIST

## 2023-01-28 PROCEDURE — 96375 TX/PRO/DX INJ NEW DRUG ADDON: CPT

## 2023-01-28 PROCEDURE — 250N000011 HC RX IP 250 OP 636: Performed by: STUDENT IN AN ORGANIZED HEALTH CARE EDUCATION/TRAINING PROGRAM

## 2023-01-28 PROCEDURE — 97530 THERAPEUTIC ACTIVITIES: CPT | Mod: GP | Performed by: PHYSICAL THERAPIST

## 2023-01-28 PROCEDURE — 85018 HEMOGLOBIN: CPT | Performed by: STUDENT IN AN ORGANIZED HEALTH CARE EDUCATION/TRAINING PROGRAM

## 2023-01-28 PROCEDURE — 97165 OT EVAL LOW COMPLEX 30 MIN: CPT | Mod: GO | Performed by: REHABILITATION PRACTITIONER

## 2023-01-28 PROCEDURE — 36415 COLL VENOUS BLD VENIPUNCTURE: CPT | Performed by: ORTHOPAEDIC SURGERY

## 2023-01-28 RX ORDER — BACLOFEN 10 MG/1
10 TABLET ORAL 2 TIMES DAILY
Status: DISCONTINUED | OUTPATIENT
Start: 2023-01-28 | End: 2023-01-28 | Stop reason: HOSPADM

## 2023-01-28 RX ORDER — BACLOFEN 10 MG/1
10 TABLET ORAL 2 TIMES DAILY
Qty: 14 TABLET | Refills: 0 | Status: SHIPPED | OUTPATIENT
Start: 2023-01-28 | End: 2023-02-04

## 2023-01-28 RX ORDER — CHOLECALCIFEROL (VITAMIN D3) 50 MCG
1 TABLET ORAL DAILY
Qty: 60 TABLET | Refills: 0 | Status: SHIPPED | OUTPATIENT
Start: 2023-01-28 | End: 2023-03-29

## 2023-01-28 RX ORDER — ACETAMINOPHEN 325 MG/1
975 TABLET ORAL EVERY 8 HOURS
Qty: 270 TABLET | Refills: 0 | Status: SHIPPED | OUTPATIENT
Start: 2023-01-28 | End: 2023-02-27

## 2023-01-28 RX ADMIN — ACETAMINOPHEN 975 MG: 325 TABLET, FILM COATED ORAL at 13:01

## 2023-01-28 RX ADMIN — ONDANSETRON HYDROCHLORIDE 4 MG: 2 INJECTION, SOLUTION INTRAMUSCULAR; INTRAVENOUS at 01:08

## 2023-01-28 RX ADMIN — ASPIRIN 325 MG: 325 TABLET, COATED ORAL at 08:25

## 2023-01-28 RX ADMIN — BACLOFEN 10 MG: 10 TABLET ORAL at 09:00

## 2023-01-28 RX ADMIN — OXYCODONE HYDROCHLORIDE 5 MG: 5 TABLET ORAL at 12:12

## 2023-01-28 RX ADMIN — OXYCODONE HYDROCHLORIDE 5 MG: 5 TABLET ORAL at 08:25

## 2023-01-28 RX ADMIN — SENNOSIDES AND DOCUSATE SODIUM 1 TABLET: 50; 8.6 TABLET ORAL at 08:26

## 2023-01-28 RX ADMIN — DIPHENHYDRAMINE HYDROCHLORIDE 25 MG: 25 CAPSULE ORAL at 09:00

## 2023-01-28 RX ADMIN — BENZOCAINE 6 MG-MENTHOL 10 MG LOZENGES 1 LOZENGE: at 01:19

## 2023-01-28 RX ADMIN — ACETAMINOPHEN 975 MG: 325 TABLET, FILM COATED ORAL at 06:58

## 2023-01-28 RX ADMIN — SODIUM CHLORIDE, POTASSIUM CHLORIDE, SODIUM LACTATE AND CALCIUM CHLORIDE: 600; 310; 30; 20 INJECTION, SOLUTION INTRAVENOUS at 05:25

## 2023-01-28 ASSESSMENT — ACTIVITIES OF DAILY LIVING (ADL)
ADLS_ACUITY_SCORE: 35
ADLS_ACUITY_SCORE: 35
IADL_COMMENTS: FAMILY TO COMPLETE AS NEEDED
ADLS_ACUITY_SCORE: 35

## 2023-01-28 NOTE — PROGRESS NOTES
[facility]  OUTPATIENT OCCUPATIONAL THERAPY  EVALUATION  PLAN OF TREATMENT FOR OUTPATIENT REHABILITATION  (COMPLETE FOR INITIAL CLAIMS ONLY)  Patient's Last Name, First Name, M.I.  YOB: 1995  Katelyn Baltazar                          Provider's Name  [facility] Medical Record No.  [id number]                             Onset Date:  01/26/23   Start of Care Date:  01/28/23   Type:     ___PT   _X_OT   ___SLP Medical Diagnosis:  R ankle fracture                    OT Diagnosis:  decreased ADL/IADls Visits from SOC:  1     See note for plan of treatment, functional goals and certification details    I CERTIFY THE NEED FOR THESE SERVICES FURNISHED UNDER        THIS PLAN OF TREATMENT AND WHILE UNDER MY CARE     (Physician co-signature of this document indicates review and certification of the therapy plan).                                      01/28/23 1200   Appointment Info   Signing Clinician's Name / Credentials (OT) Cassandra Callejas OTR/L       Present yes   Language Malian   Living Environment   People in Home spouse;child(junior), dependent   Current Living Arrangements apartment   Home Accessibility stairs to enter home   Number of Stairs, Main Entrance greater than 10 stairs   Transportation Anticipated family or friend will provide   Living Environment Comments Pt lives in apartment with 4 kids including ages 8,7,3,2 years old; spouse currently in [place] until Sunday. Mother-in-law staying with the 4 children while pt in hospital. Pt has no ADs.   Self-Care   Usual Activity Tolerance excellent   Current Activity Tolerance fair   Regular Exercise No   Equipment Currently Used at Home none   Fall history within last six months yes   Number of times patient has fallen within last six months 1   Activity/Exercise/Self-Care Comment Normally ind with all ADLs/mobility, works and has 4 kids. Radha works in  maintenance   Instrumental Activities of Daily Living (IADL)   IADL Comments family to complete as needed   General Information   Onset of Illness/Injury or Date of Surgery 01/26/23   Referring Physician Ray Tracey MD   Patient/Family Therapy Goal Statement (OT) to return home with family support   Additional Occupational Profile Info/Pertinent History of Current Problem 27 year old female who presents with a right ankle fracture after slipping on ice while walking to her car.  X-rays revealed a trimalleolar ankle fracture s/p ORIF   Right Lower Extremity (Weight-bearing Status) non weight-bearing (NWB)   General Observations and Info patient was in recliner, agreeable to OT session   Cognitive Status Examination   Orientation Status orientation to person, place and time   Visual Perception   Visual Impairment/Limitations WNL   Pain Assessment   Patient Currently in Pain Yes, see Vital Sign flowsheet  (rating 8-9/10, able to  participate in session despite pain)   Posture   Posture not impaired   Range of Motion Comprehensive   General Range of Motion bilateral upper extremity ROM WFL   Strength Comprehensive (MMT)   General Manual Muscle Testing (MMT) Assessment no strength deficits identified   Comment, General Manual Muscle Testing (MMT) Assessment in B UE   Muscle Tone Assessment   Muscle Tone Quick Adds No deficits were identified   Transfers   Transfer Comments CGA, fww or knee scooter for transfers   Balance   Balance Comments LOB was not noted, general unsteadiness to be expected   Activities of Daily Living   BADL Assessment/Intervention bathing;lower body dressing;toileting   Bathing Assessment/Intervention   [place] Level (Bathing) dependent (less than 25% patient effort)   Lower Body Dressing Assessment/Training   [place] Level (Lower Body Dressing) moderate assist (50% patient effort)   Toileting   [place] Level (Toileting) minimum assist (75% patient effort)   Clinical Impression    Criteria for Skilled Therapeutic Interventions Met (OT) Yes, treatment indicated   OT Diagnosis decreased ADL/IADls   OT Problem List-Impairments impacting ADL activity tolerance impaired;balance;post-surgical precautions;pain;strength;range of motion (ROM);mobility   Assessment of Occupational Performance 5 or more Performance Deficits   Identified Performance Deficits dsg, toilteing, bathing, functional/community mobility, driving, errands, work duties   Planned Therapy Interventions (OT) ADL retraining;progressive activity/exercise;transfer training   Clinical Decision Making Complexity (OT) low complexity   Anticipated Equipment Needs Upon Discharge (OT) shower chair;raised toilet seat   Risk & Benefits of therapy have been explained evaluation/treatment results reviewed;care plan/treatment goals reviewed;participants voiced agreement with care plan;risks/benefits reviewed;patient   OT Total Evaluation Time   OT Eval, Low Complexity Minutes (69885) 8   OT Goals   Therapy Frequency (OT) One time eval and treatment   OT Predicted Duration/Target Date for Goal Attainment 01/28/23   OT Goals Lower Body Dressing;Transfers;Toilet Transfer/Toileting;OT Goal 1;OT Goal 2   OT: Lower Body Dressing Supervision/stand-by assist;Goal Met;Completed;within precautions   OT: Transfer Minimal assist;with assistive device;within precautions;Goal Met;Completed  (tub transfer)   OT: Toilet Transfer/Toileting Supervision/stand-by assist;toilet transfer;using adaptive equipment;within precautions;Goal Met;Completed   OT: Goal 1 Patient will verbalize at least 2-3 adaptations to ADLs routines at home to accommodate energy level and safety needs.- goal met   OT: Goal 2 Patient will demonstrate safe use of walker/knee scooter during ADLs.- goal met   OT Discharge Planning   OT Plan dc   OT Discharge Recommendation (DC Rec) home with assist   OT Rationale for DC Rec patient has met needed goals for safe discharge home with family to A  as needed with IADL's; household chores, driving, errands, cooking and bathing. Recommended AE; shower chair and RTS with armrests, patient has all other needed AE. Will discharge from OT services.   OT Brief overview of current status SBA, fww sit<>stand, SBA to transition to knee scooter, min A to complete tub transfer, SBA to complete toilet transfer with RTSw/armrests   Total Session Time   Total Session Time (sum of timed and untimed services) 8
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
## 2023-01-28 NOTE — PROGRESS NOTES
Occupational Therapy Discharge Summary    Reason for therapy discharge:    All goals and outcomes met, no further needs identified.    Progress towards therapy goal(s). See goals on Care Plan in [facility] electronic health record for goal details.  Goals met    Therapy recommendation(s):    No further therapy is recommended.

## 2023-01-28 NOTE — PLAN OF CARE
Patient vital signs are at baseline: Yes  Patient able to ambulate as they were prior to admission or with assist devices provided by therapies during their stay:  Yes assist x2 with GB and walker, pivots  Patient MUST void prior to discharge:  Yes bedside commode  Patient able to tolerate oral intake:  Yes on regular diet; experienced some nausea  Pain has adequate pain control using Oral analgesics:  Yes, mild pain, takes scheduled Tylenol   Does patient have an identified :  No, from [place], has no friends, only mother in law who takes care of her 4 children   Has goal D/C date and time been discussed with patient:  Yes to home on 1/28    A&O x4. VSS, hypotensive at times. IV infusing. Dressing CDI. Surgical leg elevated, with ice pack. On [place]. On RA. Egyptian speaker. Speaks some English. Worries about cares after surgery. Need for SW.

## 2023-01-28 NOTE — PLAN OF CARE
A/o x4/ VSS. IV removed. Splint of R foot. Walker and crutches going home with patient. Meds given to patient- oxy, baclofen, miralax, aspirin, tylenol, senna. Discharge instructions gone over and understanding verbalized. Friend to pick and transport home.

## 2023-01-28 NOTE — PROGRESS NOTES
Physical Therapy Discharge Summary    Reason for therapy discharge:    Discharged to home.    Progress towards therapy goal(s). See goals on Care Plan in [facility] electronic health record for goal details.  Goals not met.  Barriers to achieving goals:   discharge from facility.    Therapy recommendation(s):    No further therapy is recommended. Until able to do PT on ankle

## 2023-01-28 NOTE — PLAN OF CARE
Patient vital signs are at baseline: Yes  Patient able to ambulate as they were prior to admission or with assist devices provided by therapies during their stay:  No,  Reason:  SBAx1 with walker.   Patient MUST void prior to discharge:  Yes  Patient able to tolerate oral intake:  Yes  Pain has adequate pain control using Oral analgesics:  Yes  Does patient have an identified :  Yes  Has goal D/C date and time been discussed with patient:  Yes     A&Ox4. Pain controlled with Oxycodone and Tylenol.  LS - clear/diminished. 02 - 96% on RA.  Pt reported numbness in the 4 smaller toes on R foot. Tingling in the 2 bigger toes on R foot. Movement seen in 2 bigger toes on R foot. Cap refill good on all toes on R foot. All toes warm and good color on R foot.   Update: Pt reported that numbness was wearing off and only in her small two toes.   Bowel sounds - hypoactive. Ate 100% of dinner. Voided with PT.   IV infusing.

## 2023-02-02 ENCOUNTER — NURSE TRIAGE (OUTPATIENT)
Dept: FAMILY MEDICINE | Facility: CLINIC | Age: 28
End: 2023-02-02
Payer: COMMERCIAL

## 2023-02-02 NOTE — TELEPHONE ENCOUNTER
"Patient calling in with ankle pain 9/10 after ankle surgery on 1/26. Patient states oxycodone doesn't help with the pain. Patient did schedule a follow up appointment with provider for 2/6.     RN advised patient she could try calling the number listed in discharge summary below and see if they can get a hold of the provider or surgeon who did surgery. Patient stated she had paperwork to get the number for this. RN advised patient if she is unable to reach anyone that she will need to present to ED as she has no PCP within [place] that I can send message to. Patient agreed and understood.     SAVI Aleman, RN       \"Follow-ups Needed After Discharge     Follow-up Appointments     Follow-up and recommended labs and tests       Please call as soon as possible to make an appointment to be seen in Dr. Denis Boudreaux's clinic in 2 weeks.      Dr. Boudreaux's care coordinator is Anya Harris. Please contact her at   [phone] to schedule an appointment.       Dr. Boudreaux sees patients at 2 clinic locations:  [facility]  [address] [place] [address]  [facility]   [address], [place] [address]        Please call the on-call phone number [phone] during evenings, nights   and weekends for any urgent needs. Prescription refills must be done   during business hours by calling [phone]. \"    Reason for Disposition    SEVERE pain (e.g., excruciating, unable to walk) and not improved after 2 hours of pain medicine    Additional Information    Negative: Followed an injury    Negative: Thigh or calf pain is main symptom    Negative: Thigh or calf swelling is main symptom    Negative: Foot pain is main symptom    Negative: Entire foot is cool or blue in comparison to other side    Negative: Fever and red area (or area very tender to touch)    Negative: Swollen joint and fever    Negative: Thigh or calf pain and only 1 side and present " > 1 hour    Negative: Thigh or calf swelling and only 1 side    Negative: Patient sounds very sick or weak to the triager    Protocols used: ANKLE PAIN-A-OH

## 2023-02-03 ENCOUNTER — TELEPHONE (OUTPATIENT)
Dept: PODIATRY | Facility: CLINIC | Age: 28
End: 2023-02-03
Payer: COMMERCIAL

## 2023-02-03 NOTE — TELEPHONE ENCOUNTER
Spoke with pt and  over the phone and had her schedule a post op with her [facility] surgeon and cancelled the appt with Dr Melchor this coming Monday per provider.

## 2023-02-06 ENCOUNTER — HOSPITAL ENCOUNTER (EMERGENCY)
Facility: CLINIC | Age: 28
Discharge: HOME | End: 2023-02-06
Attending: EMERGENCY MEDICINE | Admitting: EMERGENCY MEDICINE
Payer: COMMERCIAL

## 2023-02-06 ENCOUNTER — APPOINTMENT (OUTPATIENT)
Dept: ULTRASOUND IMAGING | Facility: CLINIC | Age: 28
End: 2023-02-06
Attending: EMERGENCY MEDICINE
Payer: COMMERCIAL

## 2023-02-06 VITALS
WEIGHT: 180 LBS | HEART RATE: 79 BPM | OXYGEN SATURATION: 100 % | TEMPERATURE: 98.1 F | BODY MASS INDEX: 32.92 KG/M2 | SYSTOLIC BLOOD PRESSURE: 107 MMHG | DIASTOLIC BLOOD PRESSURE: 68 MMHG | RESPIRATION RATE: 18 BRPM

## 2023-02-06 DIAGNOSIS — Z47.89 AFTERCARE FOR CAST OR SPLINT CHECK OR CHANGE: ICD-10-CM

## 2023-02-06 DIAGNOSIS — S82.851D CLOSED DISPLACED TRIMALLEOLAR FRACTURE OF RIGHT ANKLE WITH ROUTINE HEALING, SUBSEQUENT ENCOUNTER: ICD-10-CM

## 2023-02-06 PROCEDURE — 93971 EXTREMITY STUDY: CPT | Mod: RT

## 2023-02-06 PROCEDURE — 29515 APPLICATION SHORT LEG SPLINT: CPT | Mod: RT

## 2023-02-06 PROCEDURE — 99284 EMERGENCY DEPT VISIT MOD MDM: CPT | Mod: 25

## 2023-02-06 PROCEDURE — 250N000013 HC RX MED GY IP 250 OP 250 PS 637: Performed by: EMERGENCY MEDICINE

## 2023-02-06 RX ORDER — ACETAMINOPHEN 325 MG/1
650 TABLET ORAL ONCE
Status: COMPLETED | OUTPATIENT
Start: 2023-02-06 | End: 2023-02-06

## 2023-02-06 RX ORDER — HYDROMORPHONE HYDROCHLORIDE 2 MG/1
2 TABLET ORAL ONCE
Status: COMPLETED | OUTPATIENT
Start: 2023-02-06 | End: 2023-02-06

## 2023-02-06 RX ORDER — HYDROMORPHONE HYDROCHLORIDE 2 MG/1
2 TABLET ORAL EVERY 6 HOURS PRN
Qty: 15 TABLET | Refills: 0 | Status: SHIPPED | OUTPATIENT
Start: 2023-02-06 | End: 2023-02-09

## 2023-02-06 RX ADMIN — ACETAMINOPHEN 650 MG: 325 TABLET, FILM COATED ORAL at 22:29

## 2023-02-06 RX ADMIN — HYDROMORPHONE HYDROCHLORIDE 2 MG: 2 TABLET ORAL at 22:29

## 2023-02-06 ASSESSMENT — ACTIVITIES OF DAILY LIVING (ADL)
ADLS_ACUITY_SCORE: 33
ADLS_ACUITY_SCORE: 35

## 2023-02-06 NOTE — ED TRIAGE NOTES
Pt had right ankle surgery after slipping on the ice on 1/26. x11 days of right ankle/foot swelling. Pt concerned cast is becoming too loose. Some slight numbness/tingling to right toes for the past 3-4 days. Appt with surgeon is not until March or April.

## 2023-02-07 NOTE — DISCHARGE INSTRUCTIONS
Stop Oxycodone and use Hydromorphone for severe pain.     You can also use Tylenol    Elevate your leg when you can to help with swelling

## 2023-02-07 NOTE — ED PROVIDER NOTES
[facility] ED Provider Note  [facility] Emergency Department  2:16 AM  2/7/2023    Katelyn Baltazar  27 year oldfemale    Chief Complaint   Patient presents with     Ankle Pain       HPI:  27-year-old female presenting with right leg pain and swelling.  She had a fall at the end of January resulting in a trimalleolar fracture was hospitalized and underwent ORIF discharged on January 28.  This continued to have worsening swelling and pain in right foot.  Has pain spreading up to the calf.  No chest pain or shortness of breath and recurrent falls.  No history of VTE in the past.    Traveling to [place] 1 week from now    ROS: ROS is negative other than mentioned above in HPI    Pertinent PMH/PSH: Recent fall and trimalleolar fracture now status post ORIF    SOCIAL HISTORY: Here with a friend      Current Outpatient Medications   Medication Instructions     acetaminophen (TYLENOL) 650 mg, Oral, EVERY 6 HOURS PRN     acetaminophen (TYLENOL) 975 mg, Oral, EVERY 8 HOURS     aspirin (ASA) 325 mg, Oral, DAILY     HYDROmorphone (DILAUDID) 2 mg, Oral, EVERY 6 HOURS PRN     order for DME Equipment being ordered: Maternity belt     polyethylene glycol (MIRALAX) 17 g, Oral, DAILY     senna-docusate (SENOKOT-S/PERICOLACE) 8.6-50 MG tablet 1 tablet, Oral, 2 TIMES DAILY PRN     vitamin D3 (CHOLECALCIFEROL) 50 mcg, Oral, DAILY           No Known Allergies      Physical Exam  /68   Pulse 79   Temp 98.1  F (36.7  C) (Temporal)   Resp 18   Wt 81.6 kg (180 lb)   SpO2 100%   BMI 32.92 kg/m        Gen: Resting comfortably   CV: ppi, regular   Resp: speaking in full sentences without any resp distress  Skin: warm dry well perfused  Extremity: Right lower extremity is in a short leg cast.  Moderate swelling of the exposed toes.  The foot is warm and similar to the contralateral side.  Cap refill is less than 3 seconds.  Able to flex and extend the toes.  Sensation intact to light touch in all dermatomes.  Mild swelling  proximally above the cast at the proximal lower leg.  The splint is removed.  Incisions are clean dry and intact.  Sutures are still in place.  DP and PT pulse are 2 out of 4.  Neuro: Alert, no gross motor or sensory deficits,  gait stable      Labs and Imaging:    Labs Ordered and Resulted from Time of ED Arrival to Time of ED Departure - No data to display      US Lower Extremity Venous Duplex Right   Final Result   IMPRESSION:   1.  No deep venous thrombosis in the right lower extremity.              Medications Administered in ED:  Medications   HYDROmorphone (DILAUDID) tablet 2 mg (2 mg Oral Given 23)   acetaminophen (TYLENOL) tablet 650 mg (650 mg Oral Given 23)         Review of External Notes: Reviewed previous admission and operative fixation of the from the end of January.    Independent Interpretation (X-rays, CTs, rhythm strip):       Consultations/Discussion of Management or Tests:       Social Determinants of Health affecting care:          Medical Decision Makin-year-old female here with right foot and leg pain and increased swelling after recent ORIF of right trimalleolar fracture.  Her cast was removed.  An ultrasound was done which showed no DVT.  Her incisions show no signs of infection.  CMS is intact throughout.  She was placed in a new short leg splint with a stirrup will remain nonweightbearing as she was before and follow-up with orthopedics over the next couple days for a recheck likely traditional casting and suture removal.  Encounter completed with a .      Diagnosis:    ICD-10-CM    1. Closed displaced trimalleolar fracture of right ankle with routine healing, subsequent encounter  S82.851D       2. Aftercare for cast or splint check or change  Z47.89           Disposition:  Home      Antwon Pringle MD  [facility]       Antwon Pringle MD  23 [phone]

## 2023-02-07 NOTE — ED NOTES
PIT/Triage Evaluation    Patient presented with pain and swelling in right leg after recent ORIF for right trimalar.    Exam is notable for:    Moderate swelling of the foot/toes with tenderness.  Foot is warm cap refill less than 3 seconds also having pain to the proximal calf.    Appropriate interventions for symptom management were initiated if applicable.  Appropriate diagnostic tests were initiated if indicated.    Important information for subsequent clinician:    We will remove cath, perform ultrasound rule out DVT but do cast on discharge home follow-up with orthopedics    I briefly evaluated the patient and developed an initial plan of care. I discussed this plan and explained that this brief interaction does not constitute a full evaluation. Patient/family understands that they should wait to be fully evaluated and discuss any test results with another clinician prior to leaving the hospital.       Antwon Pringle MD  02/06/23 1951

## 2023-02-11 ENCOUNTER — HOSPITAL ENCOUNTER (EMERGENCY)
Facility: CLINIC | Age: 28
Discharge: HOME | End: 2023-02-11
Attending: PHYSICIAN ASSISTANT | Admitting: PHYSICIAN ASSISTANT
Payer: COMMERCIAL

## 2023-02-11 ENCOUNTER — APPOINTMENT (OUTPATIENT)
Dept: GENERAL RADIOLOGY | Facility: CLINIC | Age: 28
End: 2023-02-11
Attending: PHYSICIAN ASSISTANT
Payer: COMMERCIAL

## 2023-02-11 VITALS
RESPIRATION RATE: 18 BRPM | TEMPERATURE: 98 F | DIASTOLIC BLOOD PRESSURE: 68 MMHG | OXYGEN SATURATION: 98 % | HEART RATE: 76 BPM | SYSTOLIC BLOOD PRESSURE: 112 MMHG

## 2023-02-11 DIAGNOSIS — S82.851D CLOSED TRIMALLEOLAR FRACTURE OF RIGHT ANKLE WITH ROUTINE HEALING, SUBSEQUENT ENCOUNTER: ICD-10-CM

## 2023-02-11 DIAGNOSIS — G89.18 POST-OP PAIN: ICD-10-CM

## 2023-02-11 PROCEDURE — 73610 X-RAY EXAM OF ANKLE: CPT | Mod: RT

## 2023-02-11 PROCEDURE — 99283 EMERGENCY DEPT VISIT LOW MDM: CPT

## 2023-02-11 PROCEDURE — 250N000013 HC RX MED GY IP 250 OP 250 PS 637: Performed by: PHYSICIAN ASSISTANT

## 2023-02-11 RX ORDER — IBUPROFEN 600 MG/1
600 TABLET, FILM COATED ORAL ONCE
Status: COMPLETED | OUTPATIENT
Start: 2023-02-11 | End: 2023-02-11

## 2023-02-11 RX ADMIN — IBUPROFEN 600 MG: 600 TABLET, FILM COATED ORAL at 20:57

## 2023-02-11 ASSESSMENT — ENCOUNTER SYMPTOMS
VOMITING: 0
HEADACHES: 1
MYALGIAS: 1
FEVER: 0

## 2023-02-11 ASSESSMENT — ACTIVITIES OF DAILY LIVING (ADL)
ADLS_ACUITY_SCORE: 35
ADLS_ACUITY_SCORE: 33

## 2023-02-11 NOTE — ED TRIAGE NOTES
Suture removal. Also c/o worsening right ankle pain. Was seen in ED recently for worsening pain and swelling. Has not followed up with orthopedics.

## 2023-02-12 NOTE — ED PROVIDER NOTES
History     Chief Complaint:  Suture Removal and Ankle Pain       The history is provided by the patient. The history is limited by a language barrier ( used).      Katelyn Baltazar is a 27 year old female who presents with a female  for right ankle pain after a recent trimalleolar repair on 01/26/23 with Dr. Boudreaux from [facility]. Patient states she injured her right ankle at the end of January 2023. She was initially hospitalized for ORIF and then discharged. Patient has not seen an orthopedic provider since hospital discharge. She was seen here at [facility] ED on 02/06/2023 for suspected DVT. US was negative then and splint was replaced at that time. Patient states she has been unable to see orthopedic provider and she is leaving for [place] in two days. She states she continues to have right ankle pain and states she fell on her right ankle in the past couple days. Splint remains in place. No swelling or erythema around foot. No fevers, chest pain, shortness of breath, or discharge from sites.    Independent Historian: Patient with      Review of External Notes: ED Note 02/06/2023    ROS:  Review of Systems   Constitutional: Negative for fever.   Gastrointestinal: Negative for vomiting.   Musculoskeletal: Positive for myalgias (R ankle).   Neurological: Positive for headaches.   All other systems reviewed and are negative.      Allergies:  No known allergies    Medications:    Senna-docusate     Past Medical History:    Ectopic pregnancy    Past Surgical History:    Open reduction and internal fixation of the right distal fibula   Open reduction and internal fixation of the right medial malleolus  Closed reduction and splinting of the  posterior malleolus    Social History:  Patient presents with female  via personal vehicle   PCP: Sarika Lucas     Physical Exam     Patient Vitals for the past 24 hrs:   BP Temp Temp src Pulse  Resp SpO2   02/11/23 1754 112/68 98  F (36.7  C) Temporal 76 18 98 %      Physical Exam  Constitutional: Alert, attentive, GCS 15  HENT:    Nose: Nose normal.    Mouth/Throat: Oropharynx is clear, mucous membranes are moist   Eyes: EOM are normal.   CV: regular rate and rhythm; no murmurs, rubs or gallups  Chest: Effort normal and breath sounds normal.   GI:  There is no tenderness. No distension. Normal bowel sounds  MSK: Short leg splint in place. No swelling or erythema on foot or observed calf. No discharge or bleeding noted on bandages. Dorsalis pedis pulse intact.  Neurological: Alert, attentive  Skin: Skin is warm and dry.    Emergency Department Course     Imaging:  Ankle XR, G/E 3 views, right   Final Result   IMPRESSION: Normal joint spaces and alignment. No fracture. The screw fixation across the distal fibular fracture. Screw fixation of the medial malleolar fracture. The posterior malleolar fracture is not visualized. The hardware is intact. No change in    alignment. Splint is in place.         Report per radiology    Laboratory:  Labs Ordered and Resulted from Time of ED Arrival to Time of ED Departure - No data to display     Procedures     Emergency Department Course & Assessments:     Interventions:  Medications   ibuprofen (ADVIL/MOTRIN) tablet 600 mg (600 mg Oral Given 2/11/23 2057)        Independent Interpretation (X-rays, CTs, rhythm strip):  None    Consultations/Discussion of Management or Tests:     ED Course as of 02/12/23 0054   Sat Feb 11, 2023 2053 I rechecked and updated the patient.   2104 I rechecked and updated the patient. At this point I feel that the patient is safe for discharge, and the patient agrees.         Social Determinants of Health affecting care:   None    Assessments:  See ED Course above.    Disposition:  The patient was discharged to home.     Impression & Plan      Medical Decision Making:  Patient is a pleasant 27-year-old female who presents for right ankle  pain after recent trimalleolar surgical ORIF and admits to falling on her right ankle.  She has not seen an orthopedic provider since hospital discharge and states she has been unable to make an appointment.  Vitals are appropriate, she is afebrile.  Physical examination reveals short leg splint in place on right leg.  No evidence of calf erythema or swelling.  No evidence of bloody or purulent discharge on ace wrap or right foot.  Considering patient had recent trauma to her ankle, repeat x-rays were obtained and show no change to hardware or joint alignment since surgery.  Results were reviewed with patient.  Patient is leaving for [place] in 2 days early in the morning.  When asked why she has been unable to see orthopedic provider, patient states that her appointment is in April and she will be gone at that time.  No evidence of infection or DVT on exam today.  Ultrasound from 6 days ago was negative for DVT at that time.  There is no reason to remove the short leg splint at this time.  She will need to follow-up with orthopedics in [place] if she is not able to stay and follow-up with [facility].  These were all discussed with patient and patient states that she will find an orthopedic provider on [place].  Patient was given pain medications in the ED.  Return precautions and supportive cares including elevation of extremity and OTC pain medications.  Patient expressed understanding of plan and is ready for discharge.    Diagnosis:    ICD-10-CM    1. Closed trimalleolar fracture of right ankle with routine healing, subsequent encounter  S82.851D       2. Post-op pain  G89.18            Scribe Disclosure:  I, Michelle Valles, am serving as a scribe at 7:32 PM on 2/11/2023 to document services personally performed by Ara Golden PA-C based on my observations and the provider's statements to me.    2/11/2023   Ara Golden PA-C Steinbrueck, Emily, PA-C  02/12/23 0054

## 2023-02-12 NOTE — DISCHARGE INSTRUCTIONS
Your imaging shows no change to your right ankle. Continue supportive cares at home including rest, elevation and Tylenol or Motrin for pain.  Follow-up with orthopedic provided in [place] at provided phone number or follow-up with an orthopedic provider when you return to [place].  For any new or worsening symptoms, return to Emergency Department.

## (undated) DEVICE — PACK LOWER EXTREMITY RIDGES

## (undated) DEVICE — GLOVE BIOGEL PI MICRO INDICATOR UNDERGLOVE SZ 8.0 48980

## (undated) DEVICE — LINEN FULL SHEET 5511

## (undated) DEVICE — SUCTION CANISTER MEDIVAC LINER 3000ML W/LID 65651-530

## (undated) DEVICE — TOURNIQUET SGL  BLADDER 30"X4" BLUE 5921030135

## (undated) DEVICE — SUTURE MONOCRYL+ 2-0 CT-1 36" UNDYED MCP945H

## (undated) DEVICE — PREP CHLORAPREP 26ML TINTED HI-LITE ORANGE 930815

## (undated) DEVICE — DRILL BIT QUICK COUPLING CANN 2.7MM 310.67

## (undated) DEVICE — DRILL BIT SYN QUICK COUPLING 2.7X125MM 315.28

## (undated) DEVICE — GLOVE BIOGEL PI SZ 8.0 40880

## (undated) DEVICE — DRAPE IOBAN INCISE 23X17" 6650EZ

## (undated) DEVICE — DRSG GAUZE 4X4" TRAY

## (undated) DEVICE — BAG CLEAR TRASH 1.3M 39X33" P4040C

## (undated) DEVICE — GLOVE BIOGEL PI SZ 7.5 40875

## (undated) DEVICE — LINEN HALF SHEET 5512

## (undated) DEVICE — LINEN ORTHO ACL PACK 5447

## (undated) DEVICE — CAST PADDING 4" UNSTERILE 9044

## (undated) DEVICE — GUIDE WIRE 1.25X150MM 4.0MM THRD 900.722

## (undated) DEVICE — GLOVE BIOGEL PI MICRO INDICATOR UNDERGLOVE SZ 7.5 48975

## (undated) DEVICE — SU ETHILON 3-0 FS-1 18" 669H

## (undated) DEVICE — Device

## (undated) DEVICE — DRSG XEROFORM 1X8"

## (undated) DEVICE — CAST PADDING 4" STERILE 9044S

## (undated) DEVICE — DRILL BIT QUICK COUPLING 2.0X125MM  310.21

## (undated) DEVICE — ESU GROUND PAD ADULT W/CORD E7507

## (undated) DEVICE — IMP SCR SYN CORTEX 2.7X24MM SELF TAP SS 202.824
Type: IMPLANTABLE DEVICE | Site: ANKLE | Status: NON-FUNCTIONAL
Removed: 2023-01-27

## (undated) DEVICE — DRILL BIT SYN QUICK COUPLING 2.5X180MM GOLD  310.23

## (undated) DEVICE — SU VICRYL 0 CT-1 27" J340H

## (undated) DEVICE — BNDG ELASTIC 6" DBL LENGTH UNSTERILE 6611-16

## (undated) DEVICE — DRAPE C-ARMOR 5 SIDED 5523

## (undated) DEVICE — GOWN XXL 9575

## (undated) RX ORDER — ONDANSETRON 2 MG/ML
INJECTION INTRAMUSCULAR; INTRAVENOUS
Status: DISPENSED
Start: 2023-01-27

## (undated) RX ORDER — ACETAMINOPHEN 325 MG/1
TABLET ORAL
Status: DISPENSED
Start: 2023-01-27

## (undated) RX ORDER — FENTANYL CITRATE 50 UG/ML
INJECTION, SOLUTION INTRAMUSCULAR; INTRAVENOUS
Status: DISPENSED
Start: 2023-01-27

## (undated) RX ORDER — OXYCODONE HYDROCHLORIDE 5 MG/1
TABLET ORAL
Status: DISPENSED
Start: 2023-01-27

## (undated) RX ORDER — BUPIVACAINE HYDROCHLORIDE AND EPINEPHRINE 5; 5 MG/ML; UG/ML
INJECTION, SOLUTION EPIDURAL; INTRACAUDAL; PERINEURAL
Status: DISPENSED
Start: 2023-01-27

## (undated) RX ORDER — DEXMEDETOMIDINE HYDROCHLORIDE 4 UG/ML
INJECTION, SOLUTION INTRAVENOUS
Status: DISPENSED
Start: 2023-01-27